# Patient Record
Sex: FEMALE | Race: WHITE | NOT HISPANIC OR LATINO | Employment: UNEMPLOYED | URBAN - METROPOLITAN AREA
[De-identification: names, ages, dates, MRNs, and addresses within clinical notes are randomized per-mention and may not be internally consistent; named-entity substitution may affect disease eponyms.]

---

## 2017-07-27 ENCOUNTER — TRANSCRIBE ORDERS (OUTPATIENT)
Dept: ADMINISTRATIVE | Facility: HOSPITAL | Age: 22
End: 2017-07-27

## 2017-07-27 ENCOUNTER — HOSPITAL ENCOUNTER (OUTPATIENT)
Dept: RADIOLOGY | Facility: MEDICAL CENTER | Age: 22
Discharge: HOME | End: 2017-07-27
Payer: COMMERCIAL

## 2017-07-27 DIAGNOSIS — R52 PAIN: ICD-10-CM

## 2017-07-27 DIAGNOSIS — R52 PAIN: Primary | ICD-10-CM

## 2017-07-27 PROCEDURE — 74177 CT ABD & PELVIS W/CONTRAST: CPT

## 2017-07-27 RX ADMIN — IOHEXOL 50 ML: 240 INJECTION, SOLUTION INTRATHECAL; INTRAVASCULAR; INTRAVENOUS; ORAL at 14:18

## 2017-07-27 RX ADMIN — IOHEXOL 100 ML: 350 INJECTION, SOLUTION INTRAVENOUS at 14:17

## 2019-01-10 ENCOUNTER — OFFICE VISIT (OUTPATIENT)
Dept: URGENT CARE | Facility: MEDICAL CENTER | Age: 24
End: 2019-01-10
Payer: COMMERCIAL

## 2019-01-10 VITALS
OXYGEN SATURATION: 99 % | TEMPERATURE: 97.6 F | BODY MASS INDEX: 47.55 KG/M2 | WEIGHT: 258.4 LBS | HEART RATE: 102 BPM | SYSTOLIC BLOOD PRESSURE: 113 MMHG | RESPIRATION RATE: 18 BRPM | DIASTOLIC BLOOD PRESSURE: 84 MMHG | HEIGHT: 62 IN

## 2019-01-10 DIAGNOSIS — S61.219A LACERATION WITHOUT FOREIGN BODY OF UNSPECIFIED FINGER WITHOUT DAMAGE TO NAIL, INITIAL ENCOUNTER: Primary | ICD-10-CM

## 2019-01-10 PROCEDURE — 90715 TDAP VACCINE 7 YRS/> IM: CPT

## 2019-01-10 PROCEDURE — G0382 LEV 3 HOSP TYPE B ED VISIT: HCPCS | Performed by: PHYSICIAN ASSISTANT

## 2019-01-10 PROCEDURE — 12002 RPR S/N/AX/GEN/TRNK2.6-7.5CM: CPT | Performed by: PHYSICIAN ASSISTANT

## 2019-01-10 PROCEDURE — S9083 URGENT CARE CENTER GLOBAL: HCPCS | Performed by: PHYSICIAN ASSISTANT

## 2019-01-10 RX ORDER — QUETIAPINE FUMARATE 200 MG/1
200 TABLET, FILM COATED ORAL DAILY
Refills: 1 | COMMUNITY
Start: 2018-12-03 | End: 2020-01-13 | Stop reason: SDUPTHER

## 2019-01-10 RX ORDER — ATENOLOL 25 MG/1
25 TABLET ORAL DAILY
Refills: 0 | COMMUNITY
Start: 2018-12-10 | End: 2020-01-13 | Stop reason: SDUPTHER

## 2019-01-10 RX ORDER — PAROXETINE 10 MG/1
10 TABLET, FILM COATED ORAL DAILY
Refills: 0 | COMMUNITY
Start: 2019-01-03 | End: 2020-01-13 | Stop reason: SDUPTHER

## 2019-01-10 RX ORDER — SIMVASTATIN 20 MG
20 TABLET ORAL
Refills: 1 | COMMUNITY
Start: 2018-12-29 | End: 2020-05-01

## 2019-01-11 NOTE — PATIENT INSTRUCTIONS
1  Keep skin clean and dry  2  Apply topical antibiotics daily  3  Watch for S&S of infection (redness, swelling, drainage, fever)  4   Suture removal in 7 days

## 2019-01-11 NOTE — PROGRESS NOTES
[facility]        NAME: Leighton Gutierrez is a 21 y o  female  : 1995    MRN: [id number]  DATE: January 10, 2019  TIME: 7:28 PM    Assessment and Plan   Laceration without foreign body of unspecified finger without damage to nail, initial encounter [S61 [id number]]  1  Laceration without foreign body of unspecified finger without damage to nail, initial encounter  TDAP Vaccine greater than or equal to 6yo         Patient Instructions     1  Keep skin clean and dry  2  Apply topical antibiotics daily  3  Watch for S&S of infection (redness, swelling, drainage, fever)  4  Suture removal in 7 days      Chief Complaint     Chief Complaint   Patient presents with    Laceration     left index finger         History of Present Illness       The patient is a 51-year-old female presents with a laceration to the tip of her left index finger after accident with that occurred with a knife earlier this evening  She was cutting up [place] spots when the knife slipped, striking her on the tip of the left index finger  Patient cleaned and irrigated the wound but it continued to bleed, she is unaware of her last tetanus vaccine  Review of Systems   Review of Systems   Constitutional: Negative  Skin: Positive for wound  Neurological: Negative for weakness and numbness           Current Medications       Current Outpatient Prescriptions:     atenolol (TENORMIN) 25 mg tablet, Take 25 mg by mouth daily, Disp: , Rfl: 0    PARoxetine (PAXIL) 10 mg tablet, Take 10 mg by mouth daily, Disp: , Rfl: 0    QUEtiapine (SEROquel) 200 mg tablet, Take 200 mg by mouth daily, Disp: , Rfl: 1    simvastatin (ZOCOR) 20 mg tablet, Take 20 mg by mouth daily at bedtime, Disp: , Rfl: 1    Current Allergies     Allergies as of 01/10/2019    (No Known Allergies)            The following portions of the patient's history were reviewed and updated as appropriate: allergies, current medications, past family history, past medical history, past social history, past surgical history and problem list      History reviewed  No pertinent past medical history  History reviewed  No pertinent surgical history  No family history on file  Medications have been verified  Objective   /84   Pulse 102   Temp 97 6 °F (36 4 °C)   Resp 18   Ht 5' 2" (1 575 m)   Wt 117 kg (258 lb 6 4 oz)   SpO2 99%   BMI 47 26 kg/m²        Physical Exam     Physical Exam   Constitutional: She appears well-developed and well-nourished  No distress  Cardiovascular: Normal rate, regular rhythm and normal heart sounds  No murmur heard    Pulmonary/Chest: Effort normal and breath sounds normal    Skin:

## 2019-09-16 ENCOUNTER — OFFICE VISIT (OUTPATIENT)
Dept: OBGYN CLINIC | Facility: MEDICAL CENTER | Age: 24
End: 2019-09-16
Payer: COMMERCIAL

## 2019-09-16 VITALS — DIASTOLIC BLOOD PRESSURE: 82 MMHG | SYSTOLIC BLOOD PRESSURE: 128 MMHG | WEIGHT: 264 LBS | BODY MASS INDEX: 48.29 KG/M2

## 2019-09-16 DIAGNOSIS — Z11.3 SCREEN FOR STD (SEXUALLY TRANSMITTED DISEASE): Primary | ICD-10-CM

## 2019-09-16 DIAGNOSIS — N92.6 IRREGULAR BLEEDING: ICD-10-CM

## 2019-09-16 DIAGNOSIS — Z01.419 ENCNTR FOR GYN EXAM (GENERAL) (ROUTINE) W/O ABN FINDINGS: ICD-10-CM

## 2019-09-16 PROCEDURE — G0145 SCR C/V CYTO,THINLAYER,RESCR: HCPCS | Performed by: PHYSICIAN ASSISTANT

## 2019-09-16 PROCEDURE — 87591 N.GONORRHOEAE DNA AMP PROB: CPT | Performed by: PHYSICIAN ASSISTANT

## 2019-09-16 PROCEDURE — 87491 CHLMYD TRACH DNA AMP PROBE: CPT | Performed by: PHYSICIAN ASSISTANT

## 2019-09-16 PROCEDURE — 99202 OFFICE O/P NEW SF 15 MIN: CPT | Performed by: PHYSICIAN ASSISTANT

## 2019-09-16 RX ORDER — ALPRAZOLAM 0.25 MG/1
TABLET ORAL
COMMUNITY
End: 2020-04-07 | Stop reason: SDUPTHER

## 2019-09-16 NOTE — PROGRESS NOTES
Alicia Tonio Leesa  1995    S:  21 y o  female here for a problem visit  She has had a Mirena IUD since 2/2015    She gets no bleeding with her Mirena, but this past month she did get a bleed and cramping  This was very odd for her    She is sexually active with her , they are together for 5 years    Since the wedding she was dx with depression, bipolar disorder - she is "looking for a psychiatrist so I can get on disability "  Her  works at a [employer]    They will consider pregnancy in less than a year most likely   He has "baby fever," she isn't sure if she is ready to embark on this    She is on Seroquel and Paxil for her depression, Tenormin for "racing heart beat "     Past Medical History:   Diagnosis Date    Chronic kidney disease     Depression     Kidney stone     Urinary tract infection      Family History   Problem Relation Age of Onset    Cervical cancer Mother     Heart attack Mother     Heart attack Father     Hypertension Father     Hyperlipidemia Father     No Known Problems Brother     Dementia Maternal Grandmother     Alzheimer's disease Maternal Grandmother     Throat cancer Paternal Grandfather      Social History     Socioeconomic History    Marital status: /Civil Union     Spouse name: None    Number of children: None    Years of education: None    Highest education level: None   Occupational History    None   Social Needs    Financial resource strain: None    Food insecurity:     Worry: None     Inability: None    Transportation needs:     Medical: None     Non-medical: None   Tobacco Use    Smoking status: Never Smoker    Smokeless tobacco: Never Used   Substance and Sexual Activity    Alcohol use: Yes     Frequency: Never     Drinks per session: 1 or 2     Binge frequency: Never    Drug use: No    Sexual activity: Yes     Partners: Male     Birth control/protection: IUD   Lifestyle    Physical activity:     Days per week: None     Minutes per session: None    Stress: None   Relationships    Social connections:     Talks on phone: None     Gets together: None     Attends [employer] service: None     Active member of club or organization: None     Attends meetings of clubs or organizations: None     Relationship status: None    Intimate partner violence:     Fear of current or ex partner: None     Emotionally abused: None     Physically abused: None     Forced sexual activity: None   Other Topics Concern    None   Social History Narrative    None       Review of Systems   Respiratory: Negative  Cardiovascular: Negative  Gastrointestinal: Negative for constipation and diarrhea  Genitourinary: Negative for difficulty urinating, pelvic pain, vaginal bleeding, vaginal discharge, itching or odor  O:  /82 (BP Location: Right arm, Patient Position: Sitting, Cuff Size: Standard)   Wt 120 kg (264 lb)   LMP 09/02/2019 (Exact Date)   BMI 48 29 kg/m²   She appears well and is in no distress  Abdomen is soft and nontender  External genitals are normal without lesions or rashes  Vagina is normal, no discharge or bleeding noted  Cervix is normal, no lesions or discharge IUD strings are normal  Uterus is nontender, no masses  Adnexa are nontender, no pelvic masses appreciated    A/P: Irregular bleed  Check TSH, Pap, cultures, pelvic ultrasound   She may want her Mirena removed, will await results first

## 2019-09-17 LAB
C TRACH DNA SPEC QL NAA+PROBE: NEGATIVE
N GONORRHOEA DNA SPEC QL NAA+PROBE: NEGATIVE

## 2019-09-19 LAB
LAB AP GYN PRIMARY INTERPRETATION: NORMAL
Lab: NORMAL

## 2020-01-13 ENCOUNTER — OFFICE VISIT (OUTPATIENT)
Dept: FAMILY MEDICINE CLINIC | Facility: MEDICAL CENTER | Age: 25
End: 2020-01-13
Payer: COMMERCIAL

## 2020-01-13 VITALS
HEIGHT: 65 IN | SYSTOLIC BLOOD PRESSURE: 120 MMHG | WEIGHT: 269 LBS | DIASTOLIC BLOOD PRESSURE: 86 MMHG | HEART RATE: 114 BPM | BODY MASS INDEX: 44.82 KG/M2 | OXYGEN SATURATION: 98 %

## 2020-01-13 DIAGNOSIS — R94.31 ABNORMAL EKG: ICD-10-CM

## 2020-01-13 DIAGNOSIS — Z11.59 NEED FOR HEPATITIS C SCREENING TEST: ICD-10-CM

## 2020-01-13 DIAGNOSIS — R00.0 TACHYCARDIA: ICD-10-CM

## 2020-01-13 DIAGNOSIS — F31.9 BIPOLAR AFFECTIVE DISORDER, REMISSION STATUS UNSPECIFIED (HCC): Primary | ICD-10-CM

## 2020-01-13 DIAGNOSIS — E78.5 HYPERLIPIDEMIA, UNSPECIFIED HYPERLIPIDEMIA TYPE: ICD-10-CM

## 2020-01-13 DIAGNOSIS — K92.1 BLOOD IN STOOL: ICD-10-CM

## 2020-01-13 DIAGNOSIS — Z11.4 SCREENING FOR HIV (HUMAN IMMUNODEFICIENCY VIRUS): ICD-10-CM

## 2020-01-13 PROBLEM — F32.A DEPRESSION: Status: ACTIVE | Noted: 2020-01-13

## 2020-01-13 PROCEDURE — 93000 ELECTROCARDIOGRAM COMPLETE: CPT | Performed by: FAMILY MEDICINE

## 2020-01-13 PROCEDURE — 99204 OFFICE O/P NEW MOD 45 MIN: CPT | Performed by: FAMILY MEDICINE

## 2020-01-13 RX ORDER — ATENOLOL 25 MG/1
25 TABLET ORAL DAILY
Qty: 30 TABLET | Refills: 2 | Status: SHIPPED | OUTPATIENT
Start: 2020-01-13 | End: 2020-11-16 | Stop reason: ALTCHOICE

## 2020-01-13 RX ORDER — QUETIAPINE FUMARATE 50 MG/1
50 TABLET, FILM COATED ORAL 2 TIMES DAILY
Qty: 60 TABLET | Refills: 2 | Status: SHIPPED | OUTPATIENT
Start: 2020-01-13 | End: 2020-03-03 | Stop reason: SDUPTHER

## 2020-01-13 RX ORDER — PAROXETINE 10 MG/1
10 TABLET, FILM COATED ORAL DAILY
Qty: 30 TABLET | Refills: 2 | Status: SHIPPED | OUTPATIENT
Start: 2020-01-13 | End: 2020-03-03 | Stop reason: DRUGHIGH

## 2020-01-13 NOTE — PROGRESS NOTES
Assessment/Plan:    No problem-specific Assessment & Plan notes found for this encounter  Diagnoses and all orders for this visit:    Bipolar affective disorder, remission status unspecified ([facility] [address] )  -     Ambulatory referral to Psychiatry; Future  -     PARoxetine (PAXIL) 10 mg tablet; Take 1 tablet (10 mg total) by mouth daily  -     QUEtiapine (SEROquel) 50 mg tablet; Take 1 tablet (50 mg total) by mouth 2 (two) times a day  Appears to be stable  I informed patient she will need to get in with Psychiatry for continued management of her bipolar disorder  I will refill her medication for short while until she can get in but she must make an appointment with Psychiatry  Patient acknowledged understanding  Refill Paxil and Seroquel  I informed patient I will not be refilling her Xanax  Hyperlipidemia, unspecified hyperlipidemia type  -     Lipid Panel with Direct LDL reflex; Future  Check a lipid profile  Review of previous lipid profile shows cholesterol to be alarmingly elevated  I have asked patient to fast 10 hours before getting her labs drawn  Will determine at that time if statin needs to be restarted  Tachycardia  -     POCT ECG  -     CBC and differential; Future  -     Comprehensive metabolic panel; Future  -     Hemoglobin A1C; Future  -     T4, free; Future  -     TSH, 3rd generation; Future  -     Ambulatory referral to Cardiology; Future  -     atenolol (TENORMIN) 25 mg tablet; Take 1 tablet (25 mg total) by mouth daily  Tachycardia on exam   EKG shows tachycardia with some additional abnormality  Patient was informed she does need to restart atenolol 25 mg daily  She was in agreement  Will have her see Cardiology as well  Check some additional labs  Abnormal EKG  -     Ambulatory referral to Cardiology; Future  Referral to Cardiology for evaluation  Blood in stool  -     Occult Blood, Fecal Immunochemical; Future  Patient states blood in stool on review of systems    Check occult blood  May need to see GI if positive  Need for hepatitis C screening test  -     Hepatitis C antibody; Future  Screening for HIV (human immunodeficiency virus)  -     HIV 1/2 AG-AB combo; Future  Patient did give verbal consent for hep seen HIV screening  Patient declines to receive the flu vaccine or any other age-appropriate vaccinations  Follow-up in three months or sooner if needed  Subjective:      Patient ID: Charlotte Fontaine is a 25 y o  female  Patient [facility] to [facility]  She has bipolar disorder  Was seen by Psychiatry in the past but no longer  More recently she was seen at [facility] for her primary care provider who was managing her bipolar disorder  Patient states she has had depression since high school  Officially diagnosed with depression about five years ago and started on Paxil  Four years ago she was diagnosed with bipolar disorder and placed on Seroquel  Medications do help to control her symptoms  She was suicidal in the past but no longer  Also has anxiety and takes as needed alprazolam   She has paperwork from when she was 1st diagnosed with bipolar disorder  She has hyperlipidemia  She was on simvastatin 20 mg daily but ran out of medication about six months ago  He was tolerating medication well to time  She has tachycardia  Has been longstanding  She was on atenolol 25 mg daily but ran out of medication about six months ago  Tachycardia was not bothersome to patient but her previous primary insisted on medication  The following portions of the patient's history were reviewed and updated as appropriate:   She  has a past medical history of Chronic kidney disease and Depression    She   Patient Active Problem List    Diagnosis Date Noted    Bipolar disorder ([facility] [address] ) 01/13/2020    Depression 01/13/2020    Tachycardia 01/13/2020    Hyperlipidemia 01/13/2020    Abnormal EKG 01/13/2020    Blood in stool 01/13/2020     She  has a past surgical history that includes No past surgeries  Her family history includes Alzheimer's disease in her maternal grandmother; Cervical cancer in her mother; Dementia in her maternal grandmother; Heart attack in her father and mother; Hyperlipidemia in her father; Hypertension in her father; No Known Problems in her brother; Throat cancer in her paternal grandfather  She  reports that she has never smoked  She has never used smokeless tobacco  She reports that she drinks alcohol  She reports that she does not use drugs  Current Outpatient Medications   Medication Sig Dispense Refill    ALPRAZolam (XANAX) 0 25 mg tablet Take by mouth daily at bedtime as needed for anxiety      PARoxetine (PAXIL) 10 mg tablet Take 1 tablet (10 mg total) by mouth daily 30 tablet 2    QUEtiapine (SEROquel) 50 mg tablet Take 1 tablet (50 mg total) by mouth 2 (two) times a day 60 tablet 2    atenolol (TENORMIN) 25 mg tablet Take 1 tablet (25 mg total) by mouth daily 30 tablet 2    simvastatin (ZOCOR) 20 mg tablet Take 20 mg by mouth daily at bedtime  1     No current facility-administered medications for this visit  Current Outpatient Medications on File Prior to Visit   Medication Sig    ALPRAZolam (XANAX) 0 25 mg tablet Take by mouth daily at bedtime as needed for anxiety    [DISCONTINUED] PARoxetine (PAXIL) 10 mg tablet Take 10 mg by mouth daily    [DISCONTINUED] QUEtiapine (SEROquel) 200 mg tablet Take 200 mg by mouth daily    simvastatin (ZOCOR) 20 mg tablet Take 20 mg by mouth daily at bedtime    [DISCONTINUED] atenolol (TENORMIN) 25 mg tablet Take 25 mg by mouth daily     No current facility-administered medications on file prior to visit  She has No Known Allergies       Review of Systems   Constitutional: Negative for fever  HENT: Negative for ear pain, rhinorrhea and sore throat  Eyes: Negative for visual disturbance  Respiratory: Negative for shortness of breath      Cardiovascular: Negative for chest pain  Gastrointestinal: Positive for blood in stool  Negative for abdominal pain  Genitourinary: Negative for dysuria and hematuria  Musculoskeletal: Negative for arthralgias and myalgias  Skin: Negative for rash  Neurological: Negative for headaches  Psychiatric/Behavioral: Negative for dysphoric mood  The patient is not nervous/anxious  Objective:      /86 (BP Location: Left arm, Patient Position: Sitting, Cuff Size: Large)   Pulse (!) 114   Ht 5' 4 5" (1 638 m)   Wt 122 kg (269 lb)   SpO2 98%   BMI 45 46 kg/m²          Physical Exam   Constitutional: She is oriented to person, place, and time  Vital signs are normal  She appears well-developed and well-nourished  HENT:   Head: Normocephalic and atraumatic  Right Ear: Tympanic membrane, external ear and ear canal normal    Left Ear: Tympanic membrane, external ear and ear canal normal    Nose: Nose normal    Mouth/Throat: Uvula is midline, oropharynx is clear and moist and mucous membranes are normal    Eyes: Pupils are equal, round, and reactive to light  Conjunctivae, EOM and lids are normal    Neck: Trachea normal  Neck supple  No thyromegaly present  Cardiovascular: Regular rhythm, S1 normal and S2 normal  Tachycardia present  No murmur heard  Pulmonary/Chest: Effort normal and breath sounds normal    Abdominal: Soft  Bowel sounds are normal  There is no tenderness  Lymphadenopathy:     She has no cervical adenopathy  Neurological: She is alert and oriented to person, place, and time  No cranial nerve deficit  Skin: Skin is warm, dry and intact  Psychiatric: She has a normal mood and affect   Her speech is normal and behavior is normal  Thought content normal

## 2020-01-17 ENCOUNTER — TELEPHONE (OUTPATIENT)
Dept: PSYCHIATRY | Facility: CLINIC | Age: 25
End: 2020-01-17

## 2020-01-17 NOTE — TELEPHONE ENCOUNTER
Behavorial Health Outpatient Intake Questions    Referred by:    Please advised interviewee that they need to answer all questions truthfully to allow for best care and any misrepresentations of information may affect their ability to be seen at this clinic   => Was this discussed? Yes     Behavorial Health Outpatient Intake History -     Presenting Problem (in patient's words): BIPOLAR    Has the patient ever seen or is currently seeing a psychiatrist? No   If yes who/when? If seen as outpatient, have they been seen here (and by whom)? If not seen here, which provider(s) did the patient see and for how long? Has the patient ever seen or currently see a therapist? No If yes who/when? Has a member of the patient's family been in therapy here? No  If yes, with whom? Has the patient been hospitalized for mental health? Yes   If yes, how long ago was last hospitalization and where was it? 1 WEEK, [facility]     Substance Abuse:No concerns of substance abuse are reported  Does the patient have ICM or CTT? No    Is the patient taking injectable psychiatric medications? No    => If yes, patient cannot be seen here  Communications  Are there any developmental disabilities? No    Does the patient have hearing impairment? No       History-    Has the patient served in the [place] [address]? No    If yes, have you had combat services? No    Was the patient activated into federal active duty as a member of the [employer] or reserve? No    Legal History-     Does the patient have any history of arrests, long-term/FPC time, or DUIs? No  If Yes-  1) What types of charges? 2) When were they last incarcerated? 3) Are they currently on parole or probation? Minor Child-    Who has custody of the child? Is there a custody agreement? If there is a custody agreement remind parent that they must bring a copy to the first appt or they will not be seen       Intake Team, please check with provider before scheduling if flags come up such as:  - complex case  - legal history (other than DUI)  - communication barrier concerns are present  - if, in your judgment, this needs further review    ACCEPTED as a patient Yes  => Appointment Date: 03/03/2020 w/ MISSY MOREIRA    Referred Elsewhere? No    Name of Insurance Co: [employer] ID# [id number]  Insurance Phone #  If ins is primary or secondary  If patient is a minor, parents information such as Name, D  O B of guarantor

## 2020-01-29 ENCOUNTER — OFFICE VISIT (OUTPATIENT)
Dept: FAMILY MEDICINE CLINIC | Facility: CLINIC | Age: 25
End: 2020-01-29
Payer: COMMERCIAL

## 2020-01-29 VITALS
HEART RATE: 145 BPM | WEIGHT: 263 LBS | OXYGEN SATURATION: 97 % | HEIGHT: 64 IN | TEMPERATURE: 98.3 F | DIASTOLIC BLOOD PRESSURE: 86 MMHG | SYSTOLIC BLOOD PRESSURE: 136 MMHG | RESPIRATION RATE: 16 BRPM | BODY MASS INDEX: 44.9 KG/M2

## 2020-01-29 DIAGNOSIS — E78.2 MIXED HYPERLIPIDEMIA: ICD-10-CM

## 2020-01-29 DIAGNOSIS — K92.1 BLOOD IN STOOL: ICD-10-CM

## 2020-01-29 DIAGNOSIS — R00.0 TACHYCARDIA: ICD-10-CM

## 2020-01-29 DIAGNOSIS — R19.8 IRREGULAR BOWEL HABITS: ICD-10-CM

## 2020-01-29 DIAGNOSIS — Z11.4 ENCOUNTER FOR SCREENING FOR HIV: ICD-10-CM

## 2020-01-29 DIAGNOSIS — F31.31 BIPOLAR AFFECTIVE DISORDER, CURRENTLY DEPRESSED, MILD (HCC): ICD-10-CM

## 2020-01-29 DIAGNOSIS — Z00.00 ROUTINE ADULT HEALTH MAINTENANCE: Primary | ICD-10-CM

## 2020-01-29 DIAGNOSIS — R63.5 WEIGHT GAIN: ICD-10-CM

## 2020-01-29 DIAGNOSIS — Z23 NEED FOR VACCINATION: ICD-10-CM

## 2020-01-29 DIAGNOSIS — M67.432 GANGLION CYST OF DORSUM OF LEFT WRIST: ICD-10-CM

## 2020-01-29 DIAGNOSIS — R00.0 SINUS TACHYCARDIA: ICD-10-CM

## 2020-01-29 PROBLEM — E78.5 HYPERLIPIDEMIA: Status: RESOLVED | Noted: 2020-01-13 | Resolved: 2020-01-29

## 2020-01-29 PROBLEM — R94.31 ABNORMAL EKG: Status: RESOLVED | Noted: 2020-01-13 | Resolved: 2020-01-29

## 2020-01-29 PROCEDURE — 90651 9VHPV VACCINE 2/3 DOSE IM: CPT

## 2020-01-29 PROCEDURE — 99395 PREV VISIT EST AGE 18-39: CPT | Performed by: FAMILY MEDICINE

## 2020-01-29 PROCEDURE — 90471 IMMUNIZATION ADMIN: CPT

## 2020-01-29 NOTE — PROGRESS NOTES
[address] [employer] [place] 25 y o  female   DATE: January 29, 2020     Assessment and Plan:  25 y o  female exam      1  Health Maintenance  - Colonoscopy? Never had one, does have rectal bleeding and irregular bowel habits, recommend GI eval, possibly rectal fissures  - Pap? [id number]  - Labs: Re-ordered FLP, CMP, TSH, CBC, HIV  - Immunizations: Reviewed  TDaP 1/10/2019  Recommend  yearly flu vaccine-declined  HPV#1 given today, RTC 2 months for HPV#2     2  Other diagnoses addressed today:   Bipolar disorder ([facility] [address] )  Long standing diagnosis  Per patient, has been stable on Paxil 10mg, Seroquel 50mg BID and Xanax 0 25mg PRN  I agreed with previous PCP, that I can provide interim medication refills, but should establish with Psychiatry for continuing management  Pt is agreeable, has an appt for 3/3/2020 and does not need any refills today    Sinus tachycardia  Previous EKG could not be obtained, EKG today showed sinus tachycardia @ 106bpm, on exam HR 120s  Previously was on Atenolol 25mg, will check Echo prior to re-starting meds  May be related to psych meds, but will not adjust at this time    Mixed hyperlipidemia  Previously quite uncontrolled, stopped Simvastatin 20mg due to insurance reasons  Reviewed healthy, low cholesterol diet  Recheck FLP prior to restarting statin    Blood in stool  Has irregular bowel habits with diarrhea and occasional rectal bleeding, possible anal fissure, refer to GI for anoscopy or possible colonoscopy    Ganglion cyst of dorsum of left wrist  Now is having symptoms and is enlarging in size, requesting referral, provided for hand surgery      BMI Counseling: Body mass index is 45 14 kg/m²  The BMI is above normal  Nutrition recommendations include reducing intake of cholesterol  Follow up next physical in 1 year  Subjective:    Bandar Gonzalez is a 25 y o  female and is here for a comprehensive physical exam    Acute Complaints: Pt here to establish care  Was following with Dr Ranjan Yip, but her insurance change and she established with Dr Mei Hudson, but did not have a good rapport, so is looking for a new doctor  - , no children  She has a history of eating disorder with binging/purging, Bipolar disorder- Paxil 10mg, Seroquel 50mg BID, Xanax 0 25mg PRN; has an appt with Psych in 1 month  HLD- has been off of Simvastatin for 6 months due to insurance  Tachycardia- states she was started on atenolol 25mg for high heart rate, no work up other than EKG done by PCP who started med, she has been off of it for the past 6 months due to insurance/cost  Diet: blueberry muffin/cottage cheese, lunch-snack, dinner- large portion  Exercise: doesn't exercise at all, has gained weight with the Mirena (states that she gained 50 pounds with that) and Bipolar meds (gained another 50 pounds with that)  Has a cyst on her left hand that is now getting painful and getting larger in size, states she has never mentioned it to any doctors before       Histories Updated and Reviewed 1/29/2020:  Patient's Medications   New Prescriptions    No medications on file   Previous Medications    ALPRAZOLAM (XANAX) 0 25 MG TABLET    Take by mouth daily at bedtime as needed for anxiety    ATENOLOL (TENORMIN) 25 MG TABLET    Take 1 tablet (25 mg total) by mouth daily    PAROXETINE (PAXIL) 10 MG TABLET    Take 1 tablet (10 mg total) by mouth daily    QUETIAPINE (SEROQUEL) 50 MG TABLET    Take 1 tablet (50 mg total) by mouth 2 (two) times a day    SIMVASTATIN (ZOCOR) 20 MG TABLET    Take 20 mg by mouth daily at bedtime   Modified Medications    No medications on file   Discontinued Medications    No medications on file     No Known Allergies  Past Medical History:   Diagnosis Date    Chronic kidney disease     Depression     Hyperlipidemia 1/13/2020     Social History     Socioeconomic History    Marital status: /Civil Union     Spouse name: Not on file    Number of children: Not on file  Years of education: Not on file    Highest education level: Not on file   Occupational History    Not on file   Social Needs    Financial resource strain: Not on file    Food insecurity:     Worry: Not on file     Inability: Not on file    Transportation needs:     Medical: Not on file     Non-medical: Not on file   Tobacco Use    Smoking status: Never Smoker    Smokeless tobacco: Never Used   Substance and Sexual Activity    Alcohol use: Yes     Frequency: Never     Drinks per session: 1 or 2     Binge frequency: Never    Drug use: No    Sexual activity: Yes     Partners: Male     Birth control/protection: IUD   Lifestyle    Physical activity:     Days per week: Not on file     Minutes per session: Not on file    Stress: Not on file   Relationships    Social connections:     Talks on phone: Not on file     Gets together: Not on file     Attends Voodoo service: Not on file     Active member of club or organization: Not on file     Attends meetings of clubs or organizations: Not on file     Relationship status: Not on file    Intimate partner violence:     Fear of current or ex partner: Not on file     Emotionally abused: Not on file     Physically abused: Not on file     Forced sexual activity: Not on file   Other Topics Concern    Not on file   Social History Narrative    Not on file     Immunization History   Administered Date(s) Administered    DTaP,unspecified 1995, 03/30/1996, 05/29/1996, 01/29/1997, 12/07/1999    HPV9 01/29/2020    Hep B, Adolescent or Pediatric 1995, 1995, 05/29/1996    HiB 1995, 03/20/1996, 05/29/1996, 01/29/1997    IPV 03/20/1996, 05/29/1996, 12/28/1996, 12/07/1999    MMR 10/16/1996, 12/07/1999, 07/30/2008    Tdap 01/10/2019    Varicella 03/20/1996, 05/29/1996     Review of Systems:  Review of Systems   Constitutional: Negative for chills and fever  HENT: Negative for ear pain  Eyes: Negative for visual disturbance     Respiratory: Negative for cough and shortness of breath  Cardiovascular: Positive for palpitations  Negative for chest pain  Gastrointestinal: Positive for blood in stool and diarrhea  Negative for abdominal pain, nausea and vomiting  Musculoskeletal: Negative for arthralgias  Skin: Negative for rash  Neurological: Negative for headaches  Hematological: Does not bruise/bleed easily  Psychiatric/Behavioral: Positive for dysphoric mood  Objective:  /86   Pulse (!) 145   Temp 98 3 °F (36 8 °C)   Resp 16   Ht 5' 4" (1 626 m)   Wt 119 kg (263 lb)   SpO2 97%   BMI 45 14 kg/m²   Physical Exam   Constitutional: She is oriented to person, place, and time  She appears well-developed and well-nourished  No distress  obese   HENT:   Head: Normocephalic and atraumatic  Mouth/Throat: Oropharynx is clear and moist  No oropharyngeal exudate  Eyes: Pupils are equal, round, and reactive to light  EOM are normal  Right eye exhibits no discharge  Left eye exhibits no discharge  Neck: Normal range of motion  Neck supple  No JVD present  Cardiovascular: Regular rhythm and normal heart sounds  Tachycardia present  No murmur heard  Pulmonary/Chest: Effort normal and breath sounds normal  No stridor  No respiratory distress  She has no wheezes  Abdominal: Soft  Bowel sounds are normal  There is no tenderness  There is no rebound and no guarding  Musculoskeletal: Normal range of motion  She exhibits no edema or tenderness  Arms:  Neurological: She is alert and oriented to person, place, and time  Skin: Skin is warm and dry  She is not diaphoretic  No erythema  Psychiatric: She has a normal mood and affect  Her behavior is normal    Vitals reviewed  Patient Care Team:  Gabriele Hook MD as PCP - General (Family Medicine)    Gabriele Hook MD    Note: Portions of the record may have been created with voice recognition software    Occasional wrong word or "sound a like" substitutions may have occurred due to the inherent limitations of voice recognition software  Read the chart carefully and recognize, using context, where substitutions have occurred

## 2020-01-29 NOTE — ASSESSMENT & PLAN NOTE
Previously quite uncontrolled, stopped Simvastatin 20mg due to insurance reasons  Reviewed healthy, low cholesterol diet  Recheck FLP prior to restarting statin

## 2020-01-29 NOTE — ASSESSMENT & PLAN NOTE
Long standing diagnosis  Per patient, has been stable on Paxil 10mg, Seroquel 50mg BID and Xanax 0 25mg PRN  I agreed with previous PCP, that I can provide interim medication refills, but should establish with Psychiatry for continuing management   Pt is agreeable, has an appt for 3/3/2020 and does not need any refills today

## 2020-01-29 NOTE — ASSESSMENT & PLAN NOTE
Has irregular bowel habits with diarrhea and occasional rectal bleeding, possible anal fissure, refer to GI for anoscopy or possible colonoscopy

## 2020-01-29 NOTE — ASSESSMENT & PLAN NOTE
Previous EKG could not be obtained, EKG today showed sinus tachycardia @ 106bpm, on exam HR 120s   Previously was on Atenolol 25mg, will check Echo prior to re-starting meds  May be related to psych meds, but will not adjust at this time

## 2020-03-03 ENCOUNTER — OFFICE VISIT (OUTPATIENT)
Dept: PSYCHIATRY | Facility: CLINIC | Age: 25
End: 2020-03-03
Payer: COMMERCIAL

## 2020-03-03 VITALS — DIASTOLIC BLOOD PRESSURE: 77 MMHG | SYSTOLIC BLOOD PRESSURE: 112 MMHG | HEART RATE: 103 BPM

## 2020-03-03 DIAGNOSIS — F41.1 GAD (GENERALIZED ANXIETY DISORDER): ICD-10-CM

## 2020-03-03 DIAGNOSIS — F42.2 MIXED OBSESSIONAL THOUGHTS AND ACTS: ICD-10-CM

## 2020-03-03 DIAGNOSIS — F31.30 BIPOLAR AFFECTIVE DISORDER, CURRENT EPISODE DEPRESSED, CURRENT EPISODE SEVERITY UNSPECIFIED (HCC): Primary | ICD-10-CM

## 2020-03-03 DIAGNOSIS — F43.10 PTSD (POST-TRAUMATIC STRESS DISORDER): ICD-10-CM

## 2020-03-03 PROCEDURE — 90792 PSYCH DIAG EVAL W/MED SRVCS: CPT | Performed by: PHYSICIAN ASSISTANT

## 2020-03-03 PROCEDURE — 1036F TOBACCO NON-USER: CPT | Performed by: PHYSICIAN ASSISTANT

## 2020-03-03 RX ORDER — QUETIAPINE FUMARATE 50 MG/1
50 TABLET, FILM COATED ORAL 2 TIMES DAILY
Qty: 60 TABLET | Refills: 2 | Status: SHIPPED | OUTPATIENT
Start: 2020-04-13 | End: 2020-05-15 | Stop reason: SDUPTHER

## 2020-03-03 RX ORDER — PAROXETINE 10 MG/1
15 TABLET, FILM COATED ORAL DAILY
Qty: 45 TABLET | Refills: 2 | Status: SHIPPED | OUTPATIENT
Start: 2020-03-03 | End: 2020-04-07 | Stop reason: DRUGHIGH

## 2020-03-03 NOTE — BH TREATMENT PLAN
TREATMENT PLAN (Medication Management Only)        [address] [employer]    Name and Date of Birth:  Yomaira Rodrigez  1995  Date of Treatment Plan: March 3, 2020  Diagnosis/Diagnoses: Bp d/o, SUSANNA, PCD, PTSD  Strengths/Personal Resources for Self-Care: "good listener, caring wife"  Area/Areas of need (in own words): "self esteem", anxiety  1  Long Term Goal: weight loss goal   Target Date: 1 year  Person/Persons responsible for completion of goal: Pam Perales  Short Term Objective (s) - How will we reach this goal?:   A  Provider new recommended medication/dosage changes and/or continue medication(s): continue current medications as prescribed  B  start therapy  C  weight loss  Target Date: 2 months - 5/3/2020  Person/Persons Responsible for Completion of Goal: sheila Orozco  Progress Towards Goals: initiating treatment  Treatment Modality: medication management every 5 weeks, referral for individual psychotherapy  Review due 180 days from date of this plan: 09/03/20  Expected length of service: ongoing treatment  My Physician/PA/NP and I have developed this plan together and I agree to work on the goals and objectives  I understand the treatment goals that were developed for my treatment
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
## 2020-03-05 ENCOUNTER — OFFICE VISIT (OUTPATIENT)
Dept: GASTROENTEROLOGY | Facility: CLINIC | Age: 25
End: 2020-03-05
Payer: COMMERCIAL

## 2020-03-05 VITALS
WEIGHT: 264 LBS | BODY MASS INDEX: 45.32 KG/M2 | HEART RATE: 122 BPM | SYSTOLIC BLOOD PRESSURE: 128 MMHG | DIASTOLIC BLOOD PRESSURE: 88 MMHG

## 2020-03-05 DIAGNOSIS — R74.8 ELEVATED ALKALINE PHOSPHATASE IN NEWBORN: ICD-10-CM

## 2020-03-05 DIAGNOSIS — K92.1 BLOOD IN STOOL: ICD-10-CM

## 2020-03-05 DIAGNOSIS — R19.7 DIARRHEA, UNSPECIFIED TYPE: Primary | ICD-10-CM

## 2020-03-05 DIAGNOSIS — R19.8 IRREGULAR BOWEL HABITS: ICD-10-CM

## 2020-03-05 PROCEDURE — 99244 OFF/OP CNSLTJ NEW/EST MOD 40: CPT | Performed by: INTERNAL MEDICINE

## 2020-03-05 NOTE — LETTER
March 5, 2020     Ankit Ang MD  [address] [place] [address]    Patient: Maureen Mon   YOB: 1995   Date of Visit: 3/5/2020       Dear Dr Daiana Ang: Thank you for referring Trevin Lee to me for evaluation  Below are my notes for this consultation  If you have questions, please do not hesitate to call me  I look forward to following your patient along with you  Sincerely,        Stpehanie Cifuentes MD        CC: No Recipients  Stephanie Cifuentes MD  3/5/2020  2:22 PM  Incomplete  [facility]    Dear Dr Daiana Ang     I had the pleasure of seeing your patient Maureen Mon in the office today and I thank you for this kind referral        Chief Complaint:  Diarrhea      HPI:  Maureen Mon is a 25 y o  female who presents with diarrhea  According to the patient this is been going on for quite some time  It appears to be due to certain types of food particularly lactose products and eggs  However it also occurs outside of these  She has never had an actual copy of a lactose-free diet  She gets severe urgency and then has multiple loose stools  These and not watery  She does get rectal bleeding mostly on the toilet paper  She does have nocturnal symptomatology  She has no exertional symptomatology  There is no incontinence  The patient has never had a colonoscopy  She had an EGD done approximately 6 years ago which according to her was negative  She does not have any fever or chills  There is no other apparent exacerbating or remitting factor  The patient notes also indicate there is an elevation of her alkaline phosphatase  I had asked the patient if she had ever had any liver issues and she said there was an ultrasound which showed an enlarged liver but that was as far as it went  She has never any hepatitis  No other antecedent liver disease  Her last alkaline phosphatase was 158  Platelet count 660   Hemoglobin and hematocrit are normal   There is no other contributory GI history         Review of Systems:   Constitutional: No fever or chills, feels well, no tiredness, no recent weight gain or weight loss  HENT: No complaints of earache, no hearing loss, no nosebleeds, no nasal discharge, no sore throat, no hoarseness  Eyes: No complaints of eye pain, no red eyes, no discharge from eyes, no itchy eyes  Cardiovascular: No complaints of slow heart rate, no fast heart rate, no chest pain, no palpitations, no leg claudication, no lower extremity edema  Respiratory: No complaints of shortness of breath, no wheezing, no cough, no SOB on exertion, no orthopnea  Gastrointestinal: As noted in HPI  Genitourinary: No complaints of dysuria, no incontinence, no hesitancy, no nocturia  Musculoskeletal: No complaints of arthralgia, no myalgias, no joint swelling or stiffness, no limb pain or swelling  Neurological: No complaints of headache, no confusion, no convulsions, no numbness or tingling, no dizziness or fainting, no limb weakness, no difficulty walking  Skin: No complaints of skin rash or skin lesions, no itching, no skin wound, no dry skin  Hematological/Lymphatic: No complaints of swollen glands, does not bleed easy  Allergic/Immunologic: No immunocompromised state  Endocrine:  No complaints of polyuria, no polydipsia  Psychiatric/Behavioral:  no change in personality, positive PTSD, positive emotional problems, bipolar disorder         Historical Information   Past Medical History:   Diagnosis Date    Bipolar disorder ([place] [id number] )     Chronic kidney disease     Depression     Hyperlipidemia 1/13/2020    Self-injurious behavior     Suicide attempt ([place] [id number] )     2016     Past Surgical History:   Procedure Laterality Date    NO PAST SURGERIES       Social History   Social History     Substance and Sexual Activity   Alcohol Use Yes    Frequency: Monthly or less    Drinks per session: 1 or 2    Binge frequency: Never    Comment: 1-2 mixed drinks once monthly or less     Social History     Substance and Sexual Activity   Drug Use Never     Social History     Tobacco Use   Smoking Status Never Smoker   Smokeless Tobacco Never Used     Family History   Problem Relation Age of Onset    Cervical cancer Mother     Heart attack Mother     Anxiety disorder Mother     Depression Mother     Diverticulitis Mother     Heart attack Father     Hypertension Father     Hyperlipidemia Father     No Known Problems Brother     Dementia Maternal Grandmother     Alzheimer's disease Maternal Grandmother     Throat cancer Paternal Grandfather     Schizophrenia Maternal Aunt          Current Medications: has a current medication list which includes the following prescription(s): alprazolam, atenolol, paroxetine, quetiapine, and simvastatin  Vital Signs: /88   Pulse (!) 122   Wt 120 kg (264 lb)   BMI 45 32 kg/m²      Physical Exam:   Constitutional  General Appearance: No acute distress, well appearing and well nourished, central obesity  Head  Normocephalic  Eyes  Conjunctivae and lids: No swelling, erythema, or discharge  Pupils and irises: Equal, round and reactive to light  Ears, Nose, Mouth, and Throat  External inspection of ears and nose: Normal  Nasal mucosa, septum and turbinates: Normal without edema or erythema/   Oropharynx: Normal with no erythema, edema, exudate or lesions  Neck  Normal range of motion  Neck supple  Cardiovascular  Auscultation of the heart: Normal rate and rhythm, normal S1 and S2 without murmurs  Examination of the extremities for edema and/or varicosities: Normal  Pulmonary/Chest  Respiratory effort: No increased work of breathing or signs of respiratory distress  Auscultation of lungs: Clear to auscultation, equal breath sounds bilaterally, no wheezes, rales, no rhonchi  Abdomen  Abdomen: Non-tender, no masses  Liver and spleen: No hepatomegaly or splenomegaly  Musculoskeletal  Gait and station: normal   Digits and Nails: normal without clubbing or cyanosis  Inspection/palpation of joints, bones, and muscles: Normal  Neurological  No nystagmus or asterixis  Skin  Skin and subcutaneous tissue: Normal without rashes or lesions  Multiple tattoos  Lymphatic  Palpation of the lymph nodes in neck: No lymphadenopathy  Psychiatric  Orientation to person, place and time: Normal   Mood and affect: Normal          Labs:   No results found for: ALT, AST, BUN, CALCIUM, CL, CHOL, CO2, CREATININE, GFRAA, GFRNONAA, HDL, HCT, HGB, HGBA1C, LDL, MG, PHOS, PLT, K, PSA, NA, TRIG, WBC      X-Rays & Procedures:   No orders to display         ______________________________________________________________________      Assessment & Plan:      Diagnoses and all orders for this visit:    Diarrhea, unspecified type  -     Celiac Disease Antibody Profile; Future    Irregular bowel habits  -     Ambulatory referral to Gastroenterology    Blood in stool  -     Ambulatory referral to Gastroenterology    Elevated alkaline phosphatase in   -     Alkaline phosphatase, isoenzymes; Future  -     Gamma GT; Future      I Have taken liberty of scheduling the patient for colonoscopy  I have given her a copy of a lactose-free diet to follow  We will obtain fractionated alkaline phosphatase and gamma GT  We will also obtain a celiac panel  I will be happy to inform you of her results and any further recommendations  I would like to thank you for allowing me to participate in her care                With warmest regards,    Halie Severino MD, [facility]

## 2020-03-05 NOTE — H&P (VIEW-ONLY)
[facility]    Dear Dr Renuka Way     I had the pleasure of seeing your patient Fernando Giles in the office today and I thank you for this kind referral        Chief Complaint:  Diarrhea      HPI:  Fernando Giles is a 25 y o  female who presents with diarrhea  According to the patient this is been going on for quite some time  It appears to be due to certain types of food particularly lactose products and eggs  However it also occurs outside of these  She has never had an actual copy of a lactose-free diet  She gets severe urgency and then has multiple loose stools  These and not watery  She does get rectal bleeding mostly on the toilet paper  She does have nocturnal symptomatology  She has no exertional symptomatology  There is no incontinence  The patient has never had a colonoscopy  She had an EGD done approximately 6 years ago which according to her was negative  She does not have any fever or chills  There is no other apparent exacerbating or remitting factor  The patient notes also indicate there is an elevation of her alkaline phosphatase  I had asked the patient if she had ever had any liver issues and she said there was an ultrasound which showed an enlarged liver but that was as far as it went  She has never any hepatitis  No other antecedent liver disease  Her last alkaline phosphatase was 158  Platelet count 871  Hemoglobin and hematocrit are normal   There is no other contributory GI history         Review of Systems:   Constitutional: No fever or chills, feels well, no tiredness, no recent weight gain or weight loss  HENT: No complaints of earache, no hearing loss, no nosebleeds, no nasal discharge, no sore throat, no hoarseness  Eyes: No complaints of eye pain, no red eyes, no discharge from eyes, no itchy eyes    Cardiovascular: No complaints of slow heart rate, no fast heart rate, no chest pain, no palpitations, no leg claudication, no lower extremity edema  Respiratory: No complaints of shortness of breath, no wheezing, no cough, no SOB on exertion, no orthopnea  Gastrointestinal: As noted in HPI  Genitourinary: No complaints of dysuria, no incontinence, no hesitancy, no nocturia  Musculoskeletal: No complaints of arthralgia, no myalgias, no joint swelling or stiffness, no limb pain or swelling  Neurological: No complaints of headache, no confusion, no convulsions, no numbness or tingling, no dizziness or fainting, no limb weakness, no difficulty walking  Skin: No complaints of skin rash or skin lesions, no itching, no skin wound, no dry skin  Hematological/Lymphatic: No complaints of swollen glands, does not bleed easy  Allergic/Immunologic: No immunocompromised state  Endocrine:  No complaints of polyuria, no polydipsia  Psychiatric/Behavioral:  no change in personality, positive PTSD, positive emotional problems, bipolar disorder         Historical Information   Past Medical History:   Diagnosis Date    Bipolar disorder ([place] [address] )     Chronic kidney disease     Depression     Hyperlipidemia 1/13/2020    Self-injurious behavior     Suicide attempt ([place] [address] )     2016     Past Surgical History:   Procedure Laterality Date    NO PAST SURGERIES       Social History   Social History     Substance and Sexual Activity   Alcohol Use Yes    Frequency: Monthly or less    Drinks per session: 1 or 2    Binge frequency: Never    Comment: 1-2 mixed drinks once monthly or less     Social History     Substance and Sexual Activity   Drug Use Never     Social History     Tobacco Use   Smoking Status Never Smoker   Smokeless Tobacco Never Used     Family History   Problem Relation Age of Onset    Cervical cancer Mother     Heart attack Mother     Anxiety disorder Mother     Depression Mother     Diverticulitis Mother     Heart attack Father     Hypertension Father     Hyperlipidemia Father     No Known Problems Brother     Dementia Maternal Grandmother     Alzheimer's disease Maternal Grandmother     Throat cancer Paternal Grandfather     Schizophrenia Maternal Aunt          Current Medications: has a current medication list which includes the following prescription(s): alprazolam, atenolol, paroxetine, quetiapine, and simvastatin  Vital Signs: /88   Pulse (!) 122   Wt 120 kg (264 lb)   BMI 45 32 kg/m²     Physical Exam:   Constitutional  General Appearance: No acute distress, well appearing and well nourished, central obesity  Head  Normocephalic  Eyes  Conjunctivae and lids: No swelling, erythema, or discharge  Pupils and irises: Equal, round and reactive to light  Ears, Nose, Mouth, and Throat  External inspection of ears and nose: Normal  Nasal mucosa, septum and turbinates: Normal without edema or erythema/   Oropharynx: Normal with no erythema, edema, exudate or lesions  Neck  Normal range of motion  Neck supple  Cardiovascular  Auscultation of the heart: Normal rate and rhythm, normal S1 and S2 without murmurs  Examination of the extremities for edema and/or varicosities: Normal  Pulmonary/Chest  Respiratory effort: No increased work of breathing or signs of respiratory distress  Auscultation of lungs: Clear to auscultation, equal breath sounds bilaterally, no wheezes, rales, no rhonchi  Abdomen  Abdomen: Non-tender, no masses  Liver and spleen: No hepatomegaly or splenomegaly  Musculoskeletal  Gait and station: normal   Digits and Nails: normal without clubbing or cyanosis  Inspection/palpation of joints, bones, and muscles: Normal  Neurological  No nystagmus or asterixis  Skin  Skin and subcutaneous tissue: Normal without rashes or lesions  Multiple tattoos  Lymphatic  Palpation of the lymph nodes in neck: No lymphadenopathy     Psychiatric  Orientation to person, place and time: Normal   Mood and affect: Normal          Labs:   No results found for: ALT, AST, BUN, CALCIUM, CL, CHOL, CO2, CREATININE, Calleen Law, HDL, HCT, HGB, HGBA1C, LDL, MG, PHOS, PLT, K, PSA, NA, TRIG, WBC      X-Rays & Procedures:   No orders to display         ______________________________________________________________________      Assessment & Plan:      Diagnoses and all orders for this visit:    Diarrhea, unspecified type  -     Celiac Disease Antibody Profile; Future    Irregular bowel habits  -     Ambulatory referral to Gastroenterology    Blood in stool  -     Ambulatory referral to Gastroenterology    Elevated alkaline phosphatase in   -     Alkaline phosphatase, isoenzymes; Future  -     Gamma GT; Future      I Have taken liberty of scheduling the patient for colonoscopy  I have given her a copy of a lactose-free diet to follow  We will obtain fractionated alkaline phosphatase and gamma GT  We will also obtain a celiac panel  I will be happy to inform you of her results and any further recommendations  I would like to thank you for allowing me to participate in her care                With warmest regards,    Dario Carreno MD, [facility]

## 2020-03-05 NOTE — PROGRESS NOTES
[facility]    Dear Dr Corinne Hoar     I had the pleasure of seeing your patient Charlotte Fontaine in the office today and I thank you for this kind referral        Chief Complaint:  Diarrhea      HPI:  Charlotte Fontaine is a 25 y o  female who presents with diarrhea  According to the patient this is been going on for quite some time  It appears to be due to certain types of food particularly lactose products and eggs  However it also occurs outside of these  She has never had an actual copy of a lactose-free diet  She gets severe urgency and then has multiple loose stools  These and not watery  She does get rectal bleeding mostly on the toilet paper  She does have nocturnal symptomatology  She has no exertional symptomatology  There is no incontinence  The patient has never had a colonoscopy  She had an EGD done approximately 6 years ago which according to her was negative  She does not have any fever or chills  There is no other apparent exacerbating or remitting factor  The patient notes also indicate there is an elevation of her alkaline phosphatase  I had asked the patient if she had ever had any liver issues and she said there was an ultrasound which showed an enlarged liver but that was as far as it went  She has never any hepatitis  No other antecedent liver disease  Her last alkaline phosphatase was 158  Platelet count 817  Hemoglobin and hematocrit are normal   There is no other contributory GI history         Review of Systems:   Constitutional: No fever or chills, feels well, no tiredness, no recent weight gain or weight loss  HENT: No complaints of earache, no hearing loss, no nosebleeds, no nasal discharge, no sore throat, no hoarseness  Eyes: No complaints of eye pain, no red eyes, no discharge from eyes, no itchy eyes    Cardiovascular: No complaints of slow heart rate, no fast heart rate, no chest pain, no palpitations, no leg claudication, no lower extremity edema  Respiratory: No complaints of shortness of breath, no wheezing, no cough, no SOB on exertion, no orthopnea  Gastrointestinal: As noted in HPI  Genitourinary: No complaints of dysuria, no incontinence, no hesitancy, no nocturia  Musculoskeletal: No complaints of arthralgia, no myalgias, no joint swelling or stiffness, no limb pain or swelling  Neurological: No complaints of headache, no confusion, no convulsions, no numbness or tingling, no dizziness or fainting, no limb weakness, no difficulty walking  Skin: No complaints of skin rash or skin lesions, no itching, no skin wound, no dry skin  Hematological/Lymphatic: No complaints of swollen glands, does not bleed easy  Allergic/Immunologic: No immunocompromised state  Endocrine:  No complaints of polyuria, no polydipsia  Psychiatric/Behavioral:  no change in personality, positive PTSD, positive emotional problems, bipolar disorder         Historical Information   Past Medical History:   Diagnosis Date    Bipolar disorder ([place] [address] )     Chronic kidney disease     Depression     Hyperlipidemia 1/13/2020    Self-injurious behavior     Suicide attempt ([place] [address] )     2016     Past Surgical History:   Procedure Laterality Date    NO PAST SURGERIES       Social History   Social History     Substance and Sexual Activity   Alcohol Use Yes    Frequency: Monthly or less    Drinks per session: 1 or 2    Binge frequency: Never    Comment: 1-2 mixed drinks once monthly or less     Social History     Substance and Sexual Activity   Drug Use Never     Social History     Tobacco Use   Smoking Status Never Smoker   Smokeless Tobacco Never Used     Family History   Problem Relation Age of Onset    Cervical cancer Mother     Heart attack Mother     Anxiety disorder Mother     Depression Mother     Diverticulitis Mother     Heart attack Father     Hypertension Father     Hyperlipidemia Father     No Known Problems Brother     Dementia Maternal Grandmother     Alzheimer's disease Maternal Grandmother     Throat cancer Paternal Grandfather     Schizophrenia Maternal Aunt          Current Medications: has a current medication list which includes the following prescription(s): alprazolam, atenolol, paroxetine, quetiapine, and simvastatin  Vital Signs: /88   Pulse (!) 122   Wt 120 kg (264 lb)   BMI 45 32 kg/m²     Physical Exam:   Constitutional  General Appearance: No acute distress, well appearing and well nourished, central obesity  Head  Normocephalic  Eyes  Conjunctivae and lids: No swelling, erythema, or discharge  Pupils and irises: Equal, round and reactive to light  Ears, Nose, Mouth, and Throat  External inspection of ears and nose: Normal  Nasal mucosa, septum and turbinates: Normal without edema or erythema/   Oropharynx: Normal with no erythema, edema, exudate or lesions  Neck  Normal range of motion  Neck supple  Cardiovascular  Auscultation of the heart: Normal rate and rhythm, normal S1 and S2 without murmurs  Examination of the extremities for edema and/or varicosities: Normal  Pulmonary/Chest  Respiratory effort: No increased work of breathing or signs of respiratory distress  Auscultation of lungs: Clear to auscultation, equal breath sounds bilaterally, no wheezes, rales, no rhonchi  Abdomen  Abdomen: Non-tender, no masses  Liver and spleen: No hepatomegaly or splenomegaly  Musculoskeletal  Gait and station: normal   Digits and Nails: normal without clubbing or cyanosis  Inspection/palpation of joints, bones, and muscles: Normal  Neurological  No nystagmus or asterixis  Skin  Skin and subcutaneous tissue: Normal without rashes or lesions  Multiple tattoos  Lymphatic  Palpation of the lymph nodes in neck: No lymphadenopathy     Psychiatric  Orientation to person, place and time: Normal   Mood and affect: Normal          Labs:   No results found for: ALT, AST, BUN, CALCIUM, CL, CHOL, CO2, CREATININE, Thereasa Rohith, HDL, HCT, HGB, HGBA1C, LDL, MG, PHOS, PLT, K, PSA, NA, TRIG, WBC      X-Rays & Procedures:   No orders to display         ______________________________________________________________________      Assessment & Plan:      Diagnoses and all orders for this visit:    Diarrhea, unspecified type  -     Celiac Disease Antibody Profile; Future    Irregular bowel habits  -     Ambulatory referral to Gastroenterology    Blood in stool  -     Ambulatory referral to Gastroenterology    Elevated alkaline phosphatase in   -     Alkaline phosphatase, isoenzymes; Future  -     Gamma GT; Future      I Have taken liberty of scheduling the patient for colonoscopy  I have given her a copy of a lactose-free diet to follow  We will obtain fractionated alkaline phosphatase and gamma GT  We will also obtain a celiac panel  I will be happy to inform you of her results and any further recommendations  I would like to thank you for allowing me to participate in her care                With warmest regards,    Sierra Osorio MD, [facility]

## 2020-03-09 DIAGNOSIS — R00.0 TACHYCARDIA: ICD-10-CM

## 2020-03-09 RX ORDER — ATENOLOL 25 MG/1
TABLET ORAL
Qty: 30 TABLET | Refills: 2 | OUTPATIENT
Start: 2020-03-09

## 2020-03-10 ENCOUNTER — CONSULT (OUTPATIENT)
Dept: OBGYN CLINIC | Facility: CLINIC | Age: 25
End: 2020-03-10
Payer: COMMERCIAL

## 2020-03-10 VITALS
HEART RATE: 121 BPM | DIASTOLIC BLOOD PRESSURE: 77 MMHG | BODY MASS INDEX: 47.73 KG/M2 | HEIGHT: 63 IN | SYSTOLIC BLOOD PRESSURE: 116 MMHG | WEIGHT: 269.4 LBS

## 2020-03-10 DIAGNOSIS — M67.432 GANGLION CYST OF VOLAR ASPECT OF LEFT WRIST: Primary | ICD-10-CM

## 2020-03-10 PROCEDURE — 99243 OFF/OP CNSLTJ NEW/EST LOW 30: CPT | Performed by: ORTHOPAEDIC SURGERY

## 2020-03-10 PROCEDURE — 3008F BODY MASS INDEX DOCD: CPT | Performed by: PHYSICIAN ASSISTANT

## 2020-03-10 PROCEDURE — 20612 ASPIRATE/INJ GANGLION CYST: CPT | Performed by: ORTHOPAEDIC SURGERY

## 2020-03-10 RX ORDER — LIDOCAINE HYDROCHLORIDE 10 MG/ML
0.5 INJECTION, SOLUTION INFILTRATION; PERINEURAL
Status: COMPLETED | OUTPATIENT
Start: 2020-03-10 | End: 2020-03-10

## 2020-03-10 RX ORDER — TRIAMCINOLONE ACETONIDE 40 MG/ML
20 INJECTION, SUSPENSION INTRA-ARTICULAR; INTRAMUSCULAR
Status: COMPLETED | OUTPATIENT
Start: 2020-03-10 | End: 2020-03-10

## 2020-03-10 RX ADMIN — LIDOCAINE HYDROCHLORIDE 0.5 ML: 10 INJECTION, SOLUTION INFILTRATION; PERINEURAL at 09:19

## 2020-03-10 RX ADMIN — TRIAMCINOLONE ACETONIDE 20 MG: 40 INJECTION, SUSPENSION INTRA-ARTICULAR; INTRAMUSCULAR at 09:19

## 2020-03-10 NOTE — PROGRESS NOTES
CHIEF COMPLAINT:  Chief Complaint   Patient presents with    Left Wrist - Pain       SUBJECTIVE:  Mally Gutierres is a 25y o  year old RHD female who presents to the office for evaluation of her left wrist   Patient was referred by her PCP for a lump on the volar radial aspect of her left wrist   Patient states that this mass has been there for about 6 months  Patient states that this does cause her pain which increases with activity  Pt states that the size of the lump also fluctuates  Pt states that it itches at times as well  Pt is not currently taking anything for pain         PAST MEDICAL HISTORY:  Past Medical History:   Diagnosis Date    [facility]ipolar disorder ([facility] )     Chronic kidney disease     Depression     Hyperlipidemia 1/13/2020    Self-injurious behavior     Suicide attempt ([facility] )     2016       PAST SURGICAL HISTORY:  Past Surgical History:   Procedure Laterality Date    NO PAST SURGERIES         FAMILY HISTORY:  Family History   Problem Relation Age of Onset    Cervical cancer Mother     Heart attack Mother     Anxiety disorder Mother     Depression Mother     Diverticulitis Mother     Heart attack Father     Hypertension Father     Hyperlipidemia Father     No Known Problems Brother     Dementia Maternal Grandmother     Alzheimer's disease Maternal Grandmother     Throat cancer Paternal Grandfather     Schizophrenia Maternal Aunt        SOCIAL HISTORY:  Social History     Tobacco Use    Smoking status: Never Smoker    Smokeless tobacco: Never Used   Substance Use Topics    Alcohol use: Yes     Frequency: Monthly or less     Drinks per session: 1 or 2     Binge frequency: Never     Comment: 1-2 mixed drinks once monthly or less    Drug use: Never       MEDICATIONS:    Current Outpatient Medications:     ALPRAZolam (XANAX) 0 25 mg tablet, Take by mouth daily at bedtime as needed for anxiety, Disp: , Rfl:     atenolol (TENORMIN) 25 mg tablet, Take 1 tablet (25 mg total) by mouth daily, Disp: 30 tablet, Rfl: 2    PARoxetine (PAXIL) 10 mg tablet, Take 1 5 tablets (15 mg total) by mouth daily, Disp: 45 tablet, Rfl: 2    [START ON 4/13/2020] QUEtiapine (SEROquel) 50 mg tablet, Take 1 tablet (50 mg total) by mouth 2 (two) times a day, Disp: 60 tablet, Rfl: 2    simvastatin (ZOCOR) 20 mg tablet, Take 20 mg by mouth daily at bedtime, Disp: , Rfl: 1    ALLERGIES:  No Known Allergies    REVIEW OF SYSTEMS:  Review of Systems   Constitutional: Negative for chills, fever and unexpected weight change  HENT: Negative for hearing loss, nosebleeds and sore throat  Eyes: Negative for pain, redness and visual disturbance  Respiratory: Negative for cough, shortness of breath and wheezing  Cardiovascular: Negative for chest pain, palpitations and leg swelling  Gastrointestinal: Negative for abdominal pain, nausea and vomiting  Endocrine: Negative for polydipsia and polyuria  Genitourinary: Negative for dysuria and hematuria  Skin: Negative for rash and wound  Neurological: Negative for dizziness and headaches  Psychiatric/Behavioral: Negative for decreased concentration, dysphoric mood and suicidal ideas  The patient is not nervous/anxious  VITALS:  Vitals:    03/10/20 0847   BP: 116/77   Pulse: (!) 121       LABS:  HgA1c: No results found for: HGBA1C  BMP: No results found for: GLUCOSE, CALCIUM, NA, K, CO2, CL, BUN, CREATININE    _____________________________________________________  PHYSICAL EXAMINATION:  General: well developed and well nourished, alert, oriented times 3 and appears comfortable  Psychiatric: Normal  HEENT: Trachea Midline, No torticollis  Pulmonary: No audible wheezing or strider  Cardiovascular: No discernable arrhythmia   Skin: No masses, erythema, lacerations, fluctation, ulcerations  Neurovascular:  Motor Intact to the Median, Ulnar, Radial Nerve and Pulses Intact    MUSCULOSKELETAL EXAMINATION:  Positive cyst like structure on the vloar radial aspect of left wrist  measuring sub centameter   Skin is not translucent  There is not skin thinning  Skin is intact  No signs of infection  ___________________________________________________  STUDIES REVIEWED:  No studies reviewed  PROCEDURES PERFORMED:  Hand/upper extremity injection: L wrist  Date/Time: 3/10/2020 9:19 AM  Consent given by: patient  Site marked: site marked  Timeout: Immediately prior to procedure a time out was called to verify the correct patient, procedure, equipment, support staff and site/side marked as required   Supporting Documentation  Indications: pain   Procedure Details  Condition:volar carpal ganglion Site: L wrist   Preparation: Patient was prepped and draped in the usual sterile fashion  Ultrasound guidance: no  Medications administered: 0 5 mL lidocaine 1 %; 20 mg triamcinolone acetonide 40 mg/mL    Patient tolerance: patient tolerated the procedure well with no immediate complications  Dressing:  Sterile dressing applied             _____________________________________________________  ASSESSMENT/PLAN:    Left wrist volar radial  Ganglion  * cortisone steroid injection was offered and accepted by patient  * cortisone steroid injection was administered without complications  * patient was advised that the cyst can return although if it returns and is asymptomatic nothing needs to be done at that time  * if the cyst returns and causes her discomfort we can consider excision of the cyst      Follow Up:  No follow-ups on file          To Do Next Visit:  Re-evaluation of current issue        Scribe Attestation    I,:   Dorota Giordano am acting as a scribe while in the presence of the attending physician :        I,:   Altagracia Joel MD personally performed the services described in this documentation    as scribed in my presence :

## 2020-03-12 ENCOUNTER — ANESTHESIA EVENT (OUTPATIENT)
Dept: GASTROENTEROLOGY | Facility: HOSPITAL | Age: 25
End: 2020-03-12

## 2020-03-12 NOTE — ANESTHESIA PREPROCEDURE EVALUATION
Review of Systems/Medical History  Patient summary reviewed  Chart reviewed  No history of anesthetic complications     Cardiovascular  Hyperlipidemia,    Pulmonary       GI/Hepatic      Comment: Irregular bowel habits       Blood in stool       Diarrhea, unspecified type       Elevated alkaline phosphatase in           Endo/Other     GYN       Hematology   Musculoskeletal       Neurology   Psychology   Anxiety, Depression ,              Physical Exam    Airway    Mallampati score: II  TM Distance: >3 FB  Neck ROM: full     Dental   No notable dental hx     Cardiovascular  Cardiovascular exam normal    Pulmonary  Pulmonary exam normal Breath sounds clear to auscultation,     Other Findings        Anesthesia Plan  ASA Score- 2     Anesthesia Type- IV sedation with anesthesia with ASA Monitors  Additional Monitors:   Airway Plan:         Plan Factors- Patient instructed to abstain from smoking on day of procedure       Induction- intravenous  Postoperative Plan-     Informed Consent- Anesthetic plan and risks discussed with patient  I personally reviewed this patient with the CRNA  Discussed and agreed on the Anesthesia Plan with the CRNA  Leslee Fischer No results found for: WBC, HGB, HCT, MCV, PLT  No results found for: GLUCOSE, CALCIUM, NA, K, CO2, CL, BUN, CREATININE  No results found for: INR, PROTIME  No results found for: PTT          Discussed with pt the benefits/alternatives and risks or General Anesthesia including breathing tube remaining in place if not strong enough, PONV, damage to lips and teeth, sore throat, eye injury or blindness    I, Dr Kimmie Murray, the attending physician, have personally seen and evaluated the patient prior to anesthetic care  I have reviewed the pre-anesthetic record, and other medical records if appropriate to the anesthetic care  If a CRNA is involved in the case, I have reviewed the CRNA assessment, if present, and agree   The patient is in a suitable condition to proceed with my formulated anesthetic plan

## 2020-03-14 ENCOUNTER — HOSPITAL ENCOUNTER (OUTPATIENT)
Dept: GASTROENTEROLOGY | Facility: HOSPITAL | Age: 25
Setting detail: OUTPATIENT SURGERY
Discharge: HOME | End: 2020-03-14
Attending: INTERNAL MEDICINE
Payer: COMMERCIAL

## 2020-03-14 ENCOUNTER — ANESTHESIA (OUTPATIENT)
Dept: GASTROENTEROLOGY | Facility: HOSPITAL | Age: 25
End: 2020-03-14

## 2020-03-14 VITALS
TEMPERATURE: 99.1 F | HEART RATE: 88 BPM | BODY MASS INDEX: 46.33 KG/M2 | RESPIRATION RATE: 18 BRPM | OXYGEN SATURATION: 96 % | DIASTOLIC BLOOD PRESSURE: 90 MMHG | WEIGHT: 261.47 LBS | HEIGHT: 63 IN | SYSTOLIC BLOOD PRESSURE: 130 MMHG

## 2020-03-14 DIAGNOSIS — R74.8 ELEVATED ALKALINE PHOSPHATASE IN NEWBORN: ICD-10-CM

## 2020-03-14 DIAGNOSIS — K92.1 BLOOD IN STOOL: ICD-10-CM

## 2020-03-14 DIAGNOSIS — R19.7 DIARRHEA, UNSPECIFIED TYPE: ICD-10-CM

## 2020-03-14 DIAGNOSIS — R19.8 IRREGULAR BOWEL HABITS: ICD-10-CM

## 2020-03-14 LAB
EXT PREGNANCY TEST URINE: NEGATIVE
EXT. CONTROL: NORMAL

## 2020-03-14 PROCEDURE — 88305 TISSUE EXAM BY PATHOLOGIST: CPT | Performed by: PATHOLOGY

## 2020-03-14 PROCEDURE — 45380 COLONOSCOPY AND BIOPSY: CPT | Performed by: INTERNAL MEDICINE

## 2020-03-14 PROCEDURE — 81025 URINE PREGNANCY TEST: CPT | Performed by: INTERNAL MEDICINE

## 2020-03-14 RX ORDER — SODIUM CHLORIDE, SODIUM LACTATE, POTASSIUM CHLORIDE, CALCIUM CHLORIDE 600; 310; 30; 20 MG/100ML; MG/100ML; MG/100ML; MG/100ML
125 INJECTION, SOLUTION INTRAVENOUS CONTINUOUS
Status: DISCONTINUED | OUTPATIENT
Start: 2020-03-14 | End: 2020-03-18 | Stop reason: HOSPADM

## 2020-03-14 RX ORDER — SODIUM CHLORIDE, SODIUM LACTATE, POTASSIUM CHLORIDE, CALCIUM CHLORIDE 600; 310; 30; 20 MG/100ML; MG/100ML; MG/100ML; MG/100ML
INJECTION, SOLUTION INTRAVENOUS CONTINUOUS PRN
Status: DISCONTINUED | OUTPATIENT
Start: 2020-03-14 | End: 2020-03-14 | Stop reason: SURG

## 2020-03-14 RX ORDER — PROPOFOL 10 MG/ML
INJECTION, EMULSION INTRAVENOUS AS NEEDED
Status: DISCONTINUED | OUTPATIENT
Start: 2020-03-14 | End: 2020-03-14 | Stop reason: SURG

## 2020-03-14 RX ADMIN — PROPOFOL 100 MG: 10 INJECTION, EMULSION INTRAVENOUS at 11:16

## 2020-03-14 RX ADMIN — PROPOFOL 100 MG: 10 INJECTION, EMULSION INTRAVENOUS at 11:08

## 2020-03-14 RX ADMIN — SODIUM CHLORIDE, SODIUM LACTATE, POTASSIUM CHLORIDE, AND CALCIUM CHLORIDE: .6; .31; .03; .02 INJECTION, SOLUTION INTRAVENOUS at 11:07

## 2020-03-14 RX ADMIN — PROPOFOL 100 MG: 10 INJECTION, EMULSION INTRAVENOUS at 11:10

## 2020-03-14 RX ADMIN — PROPOFOL 100 MG: 10 INJECTION, EMULSION INTRAVENOUS at 11:13

## 2020-03-14 NOTE — ANESTHESIA POSTPROCEDURE EVALUATION
Post-Op Assessment Note    CV Status:  Stable  Pain Score: 1    Pain management: adequate     Mental Status:  Alert   Hydration Status:  Stable   PONV Controlled:  Controlled   Airway Patency:  Patent   Post Op Vitals Reviewed: Yes      Staff: Anesthesiologist, CRNA   Comments: PT woke up with nasal trumpet in place and started to gag   trumpet removed, vvs          BP      Temp      Pulse     Resp      SpO2

## 2020-03-14 NOTE — INTERVAL H&P NOTE
H&P reviewed  After examining the patient I find no changes in the patients condition since the H&P had been written      Vitals:    03/14/20 1025   BP: 116/73   Pulse: 105   Resp: 20   Temp: 97 9 °F (36 6 °C)   SpO2: 96%

## 2020-04-07 ENCOUNTER — TELEMEDICINE (OUTPATIENT)
Dept: PSYCHIATRY | Facility: CLINIC | Age: 25
End: 2020-04-07
Payer: COMMERCIAL

## 2020-04-07 DIAGNOSIS — F41.1 GAD (GENERALIZED ANXIETY DISORDER): ICD-10-CM

## 2020-04-07 DIAGNOSIS — F31.30 BIPOLAR AFFECTIVE DISORDER, CURRENT EPISODE DEPRESSED, CURRENT EPISODE SEVERITY UNSPECIFIED (HCC): ICD-10-CM

## 2020-04-07 DIAGNOSIS — F42.2 MIXED OBSESSIONAL THOUGHTS AND ACTS: Primary | ICD-10-CM

## 2020-04-07 DIAGNOSIS — F43.10 PTSD (POST-TRAUMATIC STRESS DISORDER): ICD-10-CM

## 2020-04-07 PROCEDURE — 99214 OFFICE O/P EST MOD 30 MIN: CPT | Performed by: PHYSICIAN ASSISTANT

## 2020-04-07 RX ORDER — ALPRAZOLAM 0.25 MG/1
0.25 TABLET ORAL DAILY PRN
Qty: 30 TABLET | Refills: 0 | Status: SHIPPED | OUTPATIENT
Start: 2020-04-07 | End: 2020-05-15

## 2020-04-07 RX ORDER — PAROXETINE HYDROCHLORIDE 20 MG/1
20 TABLET, FILM COATED ORAL DAILY
Qty: 30 TABLET | Refills: 1 | Status: SHIPPED | OUTPATIENT
Start: 2020-04-07 | End: 2020-05-15

## 2020-04-13 ENCOUNTER — TELEMEDICINE (OUTPATIENT)
Dept: BEHAVIORAL/MENTAL HEALTH CLINIC | Facility: CLINIC | Age: 25
End: 2020-04-13
Payer: COMMERCIAL

## 2020-04-13 DIAGNOSIS — F31.30 BIPOLAR AFFECTIVE DISORDER, CURRENT EPISODE DEPRESSED, CURRENT EPISODE SEVERITY UNSPECIFIED (HCC): ICD-10-CM

## 2020-04-13 DIAGNOSIS — F41.1 GAD (GENERALIZED ANXIETY DISORDER): ICD-10-CM

## 2020-04-13 DIAGNOSIS — F43.10 PTSD (POST-TRAUMATIC STRESS DISORDER): Primary | ICD-10-CM

## 2020-04-13 PROCEDURE — 90837 PSYTX W PT 60 MINUTES: CPT | Performed by: SOCIAL WORKER

## 2020-04-14 ENCOUNTER — APPOINTMENT (OUTPATIENT)
Dept: LAB | Facility: MEDICAL CENTER | Age: 25
End: 2020-04-14
Payer: COMMERCIAL

## 2020-04-14 DIAGNOSIS — Z11.4 ENCOUNTER FOR SCREENING FOR HIV: ICD-10-CM

## 2020-04-14 DIAGNOSIS — F31.31 BIPOLAR AFFECTIVE DISORDER, CURRENTLY DEPRESSED, MILD (HCC): ICD-10-CM

## 2020-04-14 DIAGNOSIS — R19.7 DIARRHEA, UNSPECIFIED TYPE: ICD-10-CM

## 2020-04-14 DIAGNOSIS — R74.8 ELEVATED ALKALINE PHOSPHATASE IN NEWBORN: ICD-10-CM

## 2020-04-14 DIAGNOSIS — R63.5 WEIGHT GAIN: ICD-10-CM

## 2020-04-14 DIAGNOSIS — E78.2 MIXED HYPERLIPIDEMIA: ICD-10-CM

## 2020-04-14 DIAGNOSIS — R00.0 TACHYCARDIA: ICD-10-CM

## 2020-04-14 LAB
ALBUMIN SERPL BCP-MCNC: 3.6 G/DL (ref 3.5–5)
ALP SERPL-CCNC: 137 U/L (ref 46–116)
ALT SERPL W P-5'-P-CCNC: 67 U/L (ref 12–78)
ANION GAP SERPL CALCULATED.3IONS-SCNC: 9 MMOL/L (ref 4–13)
AST SERPL W P-5'-P-CCNC: 32 U/L (ref 5–45)
BASOPHILS # BLD AUTO: 0.04 THOUSANDS/ΜL (ref 0–0.1)
BASOPHILS NFR BLD AUTO: 0 % (ref 0–1)
BILIRUB SERPL-MCNC: 0.39 MG/DL (ref 0.2–1)
BUN SERPL-MCNC: 11 MG/DL (ref 5–25)
CALCIUM SERPL-MCNC: 9 MG/DL (ref 8.3–10.1)
CHLORIDE SERPL-SCNC: 102 MMOL/L (ref 100–108)
CHOLEST SERPL-MCNC: 264 MG/DL (ref 50–200)
CO2 SERPL-SCNC: 24 MMOL/L (ref 21–32)
CREAT SERPL-MCNC: 0.78 MG/DL (ref 0.6–1.3)
EOSINOPHIL # BLD AUTO: 0.17 THOUSAND/ΜL (ref 0–0.61)
EOSINOPHIL NFR BLD AUTO: 1 % (ref 0–6)
ERYTHROCYTE [DISTWIDTH] IN BLOOD BY AUTOMATED COUNT: 14.2 % (ref 11.6–15.1)
EST. AVERAGE GLUCOSE BLD GHB EST-MCNC: 108 MG/DL
GFR SERPL CREATININE-BSD FRML MDRD: 107 ML/MIN/1.73SQ M
GGT SERPL-CCNC: 164 U/L (ref 5–85)
GLUCOSE P FAST SERPL-MCNC: 89 MG/DL (ref 65–99)
HBA1C MFR BLD: 5.4 %
HCT VFR BLD AUTO: 44 % (ref 34.8–46.1)
HDLC SERPL-MCNC: 32 MG/DL
HGB BLD-MCNC: 13.8 G/DL (ref 11.5–15.4)
IMM GRANULOCYTES # BLD AUTO: 0.05 THOUSAND/UL (ref 0–0.2)
IMM GRANULOCYTES NFR BLD AUTO: 0 % (ref 0–2)
LDLC SERPL DIRECT ASSAY-MCNC: 91 MG/DL (ref 0–100)
LYMPHOCYTES # BLD AUTO: 3.79 THOUSANDS/ΜL (ref 0.6–4.47)
LYMPHOCYTES NFR BLD AUTO: 29 % (ref 14–44)
MCH RBC QN AUTO: 27.4 PG (ref 26.8–34.3)
MCHC RBC AUTO-ENTMCNC: 31.4 G/DL (ref 31.4–37.4)
MCV RBC AUTO: 88 FL (ref 82–98)
MONOCYTES # BLD AUTO: 0.64 THOUSAND/ΜL (ref 0.17–1.22)
MONOCYTES NFR BLD AUTO: 5 % (ref 4–12)
NEUTROPHILS # BLD AUTO: 8.42 THOUSANDS/ΜL (ref 1.85–7.62)
NEUTS SEG NFR BLD AUTO: 65 % (ref 43–75)
NRBC BLD AUTO-RTO: 0 /100 WBCS
PLATELET # BLD AUTO: 514 THOUSANDS/UL (ref 149–390)
PMV BLD AUTO: 10.3 FL (ref 8.9–12.7)
POTASSIUM SERPL-SCNC: 3.8 MMOL/L (ref 3.5–5.3)
PROT SERPL-MCNC: 7.9 G/DL (ref 6.4–8.2)
RBC # BLD AUTO: 5.03 MILLION/UL (ref 3.81–5.12)
SODIUM SERPL-SCNC: 135 MMOL/L (ref 136–145)
T4 FREE SERPL-MCNC: 1 NG/DL (ref 0.76–1.46)
TRIGL SERPL-MCNC: 566 MG/DL
TSH SERPL DL<=0.05 MIU/L-ACNC: 4.09 UIU/ML (ref 0.36–3.74)
WBC # BLD AUTO: 13.11 THOUSAND/UL (ref 4.31–10.16)

## 2020-04-14 PROCEDURE — 82784 ASSAY IGA/IGD/IGG/IGM EACH: CPT

## 2020-04-14 PROCEDURE — 84075 ASSAY ALKALINE PHOSPHATASE: CPT

## 2020-04-14 PROCEDURE — 83721 ASSAY OF BLOOD LIPOPROTEIN: CPT

## 2020-04-14 PROCEDURE — 86255 FLUORESCENT ANTIBODY SCREEN: CPT

## 2020-04-14 PROCEDURE — 84080 ASSAY ALKALINE PHOSPHATASES: CPT

## 2020-04-14 PROCEDURE — 80053 COMPREHEN METABOLIC PANEL: CPT

## 2020-04-14 PROCEDURE — 85025 COMPLETE CBC W/AUTO DIFF WBC: CPT

## 2020-04-14 PROCEDURE — 83036 HEMOGLOBIN GLYCOSYLATED A1C: CPT

## 2020-04-14 PROCEDURE — 84443 ASSAY THYROID STIM HORMONE: CPT

## 2020-04-14 PROCEDURE — 83516 IMMUNOASSAY NONANTIBODY: CPT

## 2020-04-14 PROCEDURE — 87389 HIV-1 AG W/HIV-1&-2 AB AG IA: CPT

## 2020-04-14 PROCEDURE — 36415 COLL VENOUS BLD VENIPUNCTURE: CPT

## 2020-04-14 PROCEDURE — 82977 ASSAY OF GGT: CPT

## 2020-04-14 PROCEDURE — 84439 ASSAY OF FREE THYROXINE: CPT

## 2020-04-14 PROCEDURE — 80061 LIPID PANEL: CPT

## 2020-04-15 LAB
ENDOMYSIUM IGA SER QL: NEGATIVE
GLIADIN PEPTIDE IGA SER-ACNC: 7 UNITS (ref 0–19)
GLIADIN PEPTIDE IGG SER-ACNC: 2 UNITS (ref 0–19)
HIV 1+2 AB+HIV1 P24 AG SERPL QL IA: NORMAL
IGA SERPL-MCNC: 211 MG/DL (ref 87–352)
TTG IGA SER-ACNC: <2 U/ML (ref 0–3)
TTG IGG SER-ACNC: 3 U/ML (ref 0–5)

## 2020-04-16 LAB
ALP BONE CFR SERPL: 33 % (ref 14–68)
ALP INTEST CFR SERPL: 2 % (ref 0–18)
ALP LIVER CFR SERPL: 65 % (ref 18–85)
ALP SERPL-CCNC: 134 IU/L (ref 39–117)

## 2020-04-28 ENCOUNTER — TELEPHONE (OUTPATIENT)
Dept: FAMILY MEDICINE CLINIC | Facility: CLINIC | Age: 25
End: 2020-04-28

## 2020-05-01 ENCOUNTER — TELEMEDICINE (OUTPATIENT)
Dept: FAMILY MEDICINE CLINIC | Facility: CLINIC | Age: 25
End: 2020-05-01
Payer: COMMERCIAL

## 2020-05-01 DIAGNOSIS — F41.1 GAD (GENERALIZED ANXIETY DISORDER): ICD-10-CM

## 2020-05-01 DIAGNOSIS — F43.10 PTSD (POST-TRAUMATIC STRESS DISORDER): ICD-10-CM

## 2020-05-01 DIAGNOSIS — R06.81 APNEIC EPISODE: ICD-10-CM

## 2020-05-01 DIAGNOSIS — D75.839 THROMBOCYTOSIS: ICD-10-CM

## 2020-05-01 DIAGNOSIS — D72.828 OTHER ELEVATED WHITE BLOOD CELL (WBC) COUNT: ICD-10-CM

## 2020-05-01 DIAGNOSIS — R74.8 ELEVATED ALKALINE PHOSPHATASE LEVEL: ICD-10-CM

## 2020-05-01 DIAGNOSIS — F42.2 MIXED OBSESSIONAL THOUGHTS AND ACTS: ICD-10-CM

## 2020-05-01 DIAGNOSIS — E03.8 SUBCLINICAL HYPOTHYROIDISM: ICD-10-CM

## 2020-05-01 DIAGNOSIS — E78.2 MIXED HYPERLIPIDEMIA: Primary | ICD-10-CM

## 2020-05-01 PROBLEM — K92.1 BLOOD IN STOOL: Status: RESOLVED | Noted: 2020-01-13 | Resolved: 2020-05-01

## 2020-05-01 PROBLEM — D72.829 LEUKOCYTOSIS: Status: ACTIVE | Noted: 2020-05-01

## 2020-05-01 PROCEDURE — 99214 OFFICE O/P EST MOD 30 MIN: CPT | Performed by: FAMILY MEDICINE

## 2020-05-01 RX ORDER — ATORVASTATIN CALCIUM 40 MG/1
40 TABLET, FILM COATED ORAL
Qty: 30 TABLET | Refills: 5 | Status: SHIPPED | OUTPATIENT
Start: 2020-05-01 | End: 2020-09-03

## 2020-05-02 RX ORDER — PAROXETINE HYDROCHLORIDE 20 MG/1
TABLET, FILM COATED ORAL
Qty: 30 TABLET | Refills: 1 | OUTPATIENT
Start: 2020-05-02

## 2020-05-06 DIAGNOSIS — F31.30 BIPOLAR AFFECTIVE DISORDER, CURRENT EPISODE DEPRESSED, CURRENT EPISODE SEVERITY UNSPECIFIED (HCC): ICD-10-CM

## 2020-05-15 ENCOUNTER — TELEMEDICINE (OUTPATIENT)
Dept: PSYCHIATRY | Facility: CLINIC | Age: 25
End: 2020-05-15
Payer: COMMERCIAL

## 2020-05-15 DIAGNOSIS — F42.2 MIXED OBSESSIONAL THOUGHTS AND ACTS: ICD-10-CM

## 2020-05-15 DIAGNOSIS — F41.1 GAD (GENERALIZED ANXIETY DISORDER): Primary | ICD-10-CM

## 2020-05-15 DIAGNOSIS — F31.30 BIPOLAR AFFECTIVE DISORDER, CURRENT EPISODE DEPRESSED, CURRENT EPISODE SEVERITY UNSPECIFIED (HCC): ICD-10-CM

## 2020-05-15 DIAGNOSIS — F43.10 PTSD (POST-TRAUMATIC STRESS DISORDER): ICD-10-CM

## 2020-05-15 DIAGNOSIS — Z63.4 BEREAVEMENT: ICD-10-CM

## 2020-05-15 PROCEDURE — 99215 OFFICE O/P EST HI 40 MIN: CPT | Performed by: PHYSICIAN ASSISTANT

## 2020-05-15 RX ORDER — ALPRAZOLAM 0.25 MG/1
0.25 TABLET ORAL DAILY PRN
Qty: 30 TABLET | Refills: 2 | Status: SHIPPED | OUTPATIENT
Start: 2020-05-15 | End: 2020-07-17

## 2020-05-15 RX ORDER — QUETIAPINE FUMARATE 100 MG/1
TABLET, FILM COATED ORAL
Qty: 45 TABLET | Refills: 2 | Status: SHIPPED | OUTPATIENT
Start: 2020-05-15 | End: 2020-07-17

## 2020-05-15 RX ORDER — PAROXETINE HYDROCHLORIDE 20 MG/1
20 TABLET, FILM COATED ORAL DAILY
Qty: 30 TABLET | Refills: 1 | Status: SHIPPED | OUTPATIENT
Start: 2020-05-15 | End: 2020-06-22

## 2020-05-15 SDOH — SOCIAL STABILITY - SOCIAL INSECURITY: DISSAPEARANCE AND DEATH OF FAMILY MEMBER: Z63.4

## 2020-05-18 RX ORDER — QUETIAPINE FUMARATE 50 MG/1
TABLET, FILM COATED ORAL
Qty: 60 TABLET | Refills: 2 | OUTPATIENT
Start: 2020-05-18

## 2020-06-01 ENCOUNTER — TELEMEDICINE (OUTPATIENT)
Dept: BEHAVIORAL/MENTAL HEALTH CLINIC | Facility: CLINIC | Age: 25
End: 2020-06-01
Payer: COMMERCIAL

## 2020-06-01 DIAGNOSIS — F41.1 GAD (GENERALIZED ANXIETY DISORDER): ICD-10-CM

## 2020-06-01 DIAGNOSIS — F43.10 PTSD (POST-TRAUMATIC STRESS DISORDER): ICD-10-CM

## 2020-06-01 DIAGNOSIS — F31.30 BIPOLAR AFFECTIVE DISORDER, CURRENT EPISODE DEPRESSED, CURRENT EPISODE SEVERITY UNSPECIFIED (HCC): Primary | ICD-10-CM

## 2020-06-01 PROCEDURE — 90834 PSYTX W PT 45 MINUTES: CPT | Performed by: SOCIAL WORKER

## 2020-06-15 ENCOUNTER — TELEMEDICINE (OUTPATIENT)
Dept: BEHAVIORAL/MENTAL HEALTH CLINIC | Facility: CLINIC | Age: 25
End: 2020-06-15
Payer: COMMERCIAL

## 2020-06-15 DIAGNOSIS — F31.30 BIPOLAR AFFECTIVE DISORDER, CURRENT EPISODE DEPRESSED, CURRENT EPISODE SEVERITY UNSPECIFIED (HCC): ICD-10-CM

## 2020-06-15 DIAGNOSIS — F41.1 GAD (GENERALIZED ANXIETY DISORDER): Primary | ICD-10-CM

## 2020-06-15 PROCEDURE — 90834 PSYTX W PT 45 MINUTES: CPT | Performed by: SOCIAL WORKER

## 2020-06-22 DIAGNOSIS — F41.1 GAD (GENERALIZED ANXIETY DISORDER): ICD-10-CM

## 2020-06-22 DIAGNOSIS — F42.2 MIXED OBSESSIONAL THOUGHTS AND ACTS: ICD-10-CM

## 2020-06-22 DIAGNOSIS — F43.10 PTSD (POST-TRAUMATIC STRESS DISORDER): ICD-10-CM

## 2020-06-22 RX ORDER — PAROXETINE HYDROCHLORIDE 20 MG/1
TABLET, FILM COATED ORAL
Qty: 30 TABLET | Refills: 1 | Status: SHIPPED | OUTPATIENT
Start: 2020-06-22 | End: 2020-07-17

## 2020-07-06 DIAGNOSIS — F41.1 GAD (GENERALIZED ANXIETY DISORDER): ICD-10-CM

## 2020-07-06 DIAGNOSIS — F31.30 BIPOLAR AFFECTIVE DISORDER, CURRENT EPISODE DEPRESSED, CURRENT EPISODE SEVERITY UNSPECIFIED (HCC): ICD-10-CM

## 2020-07-06 RX ORDER — QUETIAPINE FUMARATE 100 MG/1
TABLET, FILM COATED ORAL
Qty: 45 TABLET | Refills: 2 | OUTPATIENT
Start: 2020-07-06

## 2020-07-06 RX ORDER — ALPRAZOLAM 0.25 MG/1
TABLET ORAL
Qty: 30 TABLET | OUTPATIENT
Start: 2020-07-06
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
## 2020-07-17 ENCOUNTER — TELEMEDICINE (OUTPATIENT)
Dept: PSYCHIATRY | Facility: CLINIC | Age: 25
End: 2020-07-17
Payer: COMMERCIAL

## 2020-07-17 DIAGNOSIS — F42.2 MIXED OBSESSIONAL THOUGHTS AND ACTS: ICD-10-CM

## 2020-07-17 DIAGNOSIS — F41.1 GAD (GENERALIZED ANXIETY DISORDER): ICD-10-CM

## 2020-07-17 DIAGNOSIS — F31.30 BIPOLAR AFFECTIVE DISORDER, CURRENT EPISODE DEPRESSED, CURRENT EPISODE SEVERITY UNSPECIFIED (HCC): ICD-10-CM

## 2020-07-17 DIAGNOSIS — F43.10 PTSD (POST-TRAUMATIC STRESS DISORDER): ICD-10-CM

## 2020-07-17 PROCEDURE — 99214 OFFICE O/P EST MOD 30 MIN: CPT | Performed by: PHYSICIAN ASSISTANT

## 2020-07-17 PROCEDURE — 1036F TOBACCO NON-USER: CPT | Performed by: PHYSICIAN ASSISTANT

## 2020-07-17 RX ORDER — QUETIAPINE FUMARATE 100 MG/1
TABLET, FILM COATED ORAL
Qty: 45 TABLET | Refills: 2 | Status: SHIPPED | OUTPATIENT
Start: 2020-07-17 | End: 2020-10-15 | Stop reason: SDUPTHER

## 2020-07-17 RX ORDER — ALPRAZOLAM 0.25 MG/1
0.25 TABLET ORAL DAILY PRN
Qty: 30 TABLET | Refills: 2 | Status: SHIPPED | OUTPATIENT
Start: 2020-07-17 | End: 2020-10-15 | Stop reason: SDUPTHER

## 2020-07-17 RX ORDER — PAROXETINE 30 MG/1
30 TABLET, FILM COATED ORAL DAILY
Qty: 30 TABLET | Refills: 2 | Status: SHIPPED | OUTPATIENT
Start: 2020-07-17 | End: 2020-10-15 | Stop reason: SDUPTHER

## 2020-07-22 ENCOUNTER — TELEMEDICINE (OUTPATIENT)
Dept: BEHAVIORAL/MENTAL HEALTH CLINIC | Facility: CLINIC | Age: 25
End: 2020-07-22
Payer: COMMERCIAL

## 2020-07-22 DIAGNOSIS — F41.1 GAD (GENERALIZED ANXIETY DISORDER): Primary | ICD-10-CM

## 2020-07-22 DIAGNOSIS — F31.30 BIPOLAR AFFECTIVE DISORDER, CURRENT EPISODE DEPRESSED, CURRENT EPISODE SEVERITY UNSPECIFIED (HCC): ICD-10-CM

## 2020-07-22 PROCEDURE — 1036F TOBACCO NON-USER: CPT | Performed by: SOCIAL WORKER

## 2020-07-22 PROCEDURE — 90834 PSYTX W PT 45 MINUTES: CPT | Performed by: SOCIAL WORKER
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
## 2020-08-05 ENCOUNTER — TELEMEDICINE (OUTPATIENT)
Dept: BEHAVIORAL/MENTAL HEALTH CLINIC | Facility: CLINIC | Age: 25
End: 2020-08-05
Payer: COMMERCIAL

## 2020-08-05 DIAGNOSIS — F31.30 BIPOLAR AFFECTIVE DISORDER, CURRENT EPISODE DEPRESSED, CURRENT EPISODE SEVERITY UNSPECIFIED (HCC): ICD-10-CM

## 2020-08-05 DIAGNOSIS — F41.1 GAD (GENERALIZED ANXIETY DISORDER): Primary | ICD-10-CM

## 2020-08-05 PROCEDURE — 90834 PSYTX W PT 45 MINUTES: CPT | Performed by: SOCIAL WORKER

## 2020-08-05 NOTE — PSYCH
Virtual Regular Visit      Assessment/Plan:    Problem List Items Addressed This Visit        Other    Bipolar affective disorder, current episode depressed ([address] )    SUSANNA (generalized anxiety disorder) - Primary               Reason for visit is No chief complaint on file  Encounter provider Brooks Gonzales    Provider located at [address] [place] [id number] [phone]      Recent Visits  No visits were found meeting these conditions  Showing recent visits within past 7 days and meeting all other requirements     Future Appointments  No visits were found meeting these conditions  Showing future appointments within next 150 days and meeting all other requirements        The patient was identified by name and date of birth  Kaitlynn Merino was informed that this is a telemedicine visit and that the visit is being conducted through [employer]  My office door was closed  No one else was in the room  She acknowledged consent and understanding of privacy and security of the video platform  The patient has agreed to participate and understands they can discontinue the visit at any time  Patient is aware this is a billable service  Subjective  aKitlynn Merino is a 25 y o  female    how she can set and achieve small goals, written log of her daily wins,scheduling both her chores and positive activity  Edu Adan reported she continues to do well  She shared her notebook that she is keeping track of her wins, she continues to identify two chores daily, and also do some painting of pictures in between for fun  She did look at the website for the spiritual shoppe but did not see online classes  They still have not heard about the mortgage       Session focused on Pam's uncle, the brother of her father, who continues to call her in order to find out information about her father's belongings and try to "persuade" her mother to giving them over  We discussed setting clear limits, letting the uncle know that she is not involved in this and does not wish to be involved  We discussed that if necessary she does not need to answer the phone  She agreed  She will practice this  We then discussed exploring two spiritual shops for potential online classes, as a way of learning and interacting without the stress and anxiety of having to meet in person  She agreed with this  She will continue to use the above listed tools  Sathish Meyer was neatly dressed, fully oriented, made consistent eye contact with her speech being of normal rate and tone  She was cooperative, her affect was in normal range,  her mood was euthymic, MS- appearance, her thought process logical and goal directed, she was able to be attentive during session and display insight  Sathish Meyer will continue biweekly, she will continue with recording her wins, setting small goals, and pairing a have to activity and with a want to activity     HPI     Past Medical History:   Diagnosis Date    [facility] disorder ([facility] )     Chronic kidney disease     Depression     Fatty liver     Hyperlipidemia 1/13/2020    Self-injurious behavior     Suicide attempt ([facility] )     2016       Past Surgical History:   Procedure Laterality Date    NO PAST SURGERIES         Current Outpatient Medications   Medication Sig Dispense Refill    ALPRAZolam (XANAX) 0 25 mg tablet Take 1 tablet (0 25 mg total) by mouth daily as needed for anxiety 30 tablet 2    atenolol (TENORMIN) 25 mg tablet Take 1 tablet (25 mg total) by mouth daily 30 tablet 2    atorvastatin (LIPITOR) 40 mg tablet Take 1 tablet (40 mg total) by mouth daily at bedtime 30 tablet 5    PARoxetine (PAXIL) 30 mg tablet Take 1 tablet (30 mg total) by mouth daily 30 tablet 2    QUEtiapine (SEROquel) 100 mg tablet Take 1/2 tab po qAM and 1 full tab po qhs 45 tablet 2     No current facility-administered medications for this visit  No Known Allergies    Review of Systems    Video Exam    There were no vitals filed for this visit  Physical Exam     I spent 45 minutes directly with the patient during this visit      VIRTUAL VISIT DISCLAIMER    Yessy Burgos acknowledges that she has consented to an online visit or consultation  She understands that the online visit is based solely on information provided by her, and that, in the absence of a face-to-face physical evaluation by the physician, the diagnosis she receives is both limited and provisional in terms of accuracy and completeness  This is not intended to replace a full medical face-to-face evaluation by the physician  Yessy Burgos understands and accepts these terms

## 2020-08-17 ENCOUNTER — APPOINTMENT (OUTPATIENT)
Dept: LAB | Facility: MEDICAL CENTER | Age: 25
End: 2020-08-17
Payer: COMMERCIAL

## 2020-08-17 DIAGNOSIS — N92.6 IRREGULAR BLEEDING: ICD-10-CM

## 2020-08-17 DIAGNOSIS — D75.839 THROMBOCYTOSIS: ICD-10-CM

## 2020-08-17 DIAGNOSIS — R74.8 ELEVATED ALKALINE PHOSPHATASE LEVEL: ICD-10-CM

## 2020-08-17 DIAGNOSIS — E78.2 MIXED HYPERLIPIDEMIA: ICD-10-CM

## 2020-08-17 DIAGNOSIS — E03.8 SUBCLINICAL HYPOTHYROIDISM: ICD-10-CM

## 2020-08-17 DIAGNOSIS — D72.828 OTHER ELEVATED WHITE BLOOD CELL (WBC) COUNT: ICD-10-CM

## 2020-08-17 LAB
ALBUMIN SERPL BCP-MCNC: 3.4 G/DL (ref 3.5–5)
ALP SERPL-CCNC: 135 U/L (ref 46–116)
ALT SERPL W P-5'-P-CCNC: 53 U/L (ref 12–78)
ANION GAP SERPL CALCULATED.3IONS-SCNC: 7 MMOL/L (ref 4–13)
AST SERPL W P-5'-P-CCNC: 29 U/L (ref 5–45)
BASOPHILS # BLD AUTO: 0.05 THOUSANDS/ΜL (ref 0–0.1)
BASOPHILS NFR BLD AUTO: 0 % (ref 0–1)
BILIRUB SERPL-MCNC: 0.35 MG/DL (ref 0.2–1)
BUN SERPL-MCNC: 11 MG/DL (ref 5–25)
CALCIUM SERPL-MCNC: 9.1 MG/DL (ref 8.3–10.1)
CHLORIDE SERPL-SCNC: 103 MMOL/L (ref 100–108)
CHOLEST SERPL-MCNC: 174 MG/DL (ref 50–200)
CO2 SERPL-SCNC: 27 MMOL/L (ref 21–32)
CREAT SERPL-MCNC: 0.65 MG/DL (ref 0.6–1.3)
EOSINOPHIL # BLD AUTO: 0.17 THOUSAND/ΜL (ref 0–0.61)
EOSINOPHIL NFR BLD AUTO: 1 % (ref 0–6)
ERYTHROCYTE [DISTWIDTH] IN BLOOD BY AUTOMATED COUNT: 13.7 % (ref 11.6–15.1)
GFR SERPL CREATININE-BSD FRML MDRD: 125 ML/MIN/1.73SQ M
GLUCOSE P FAST SERPL-MCNC: 77 MG/DL (ref 65–99)
HCT VFR BLD AUTO: 43.2 % (ref 34.8–46.1)
HDLC SERPL-MCNC: 36 MG/DL
HGB BLD-MCNC: 13.1 G/DL (ref 11.5–15.4)
IMM GRANULOCYTES # BLD AUTO: 0.05 THOUSAND/UL (ref 0–0.2)
IMM GRANULOCYTES NFR BLD AUTO: 0 % (ref 0–2)
LDLC SERPL CALC-MCNC: 59 MG/DL (ref 0–100)
LYMPHOCYTES # BLD AUTO: 3.38 THOUSANDS/ΜL (ref 0.6–4.47)
LYMPHOCYTES NFR BLD AUTO: 25 % (ref 14–44)
MCH RBC QN AUTO: 27.2 PG (ref 26.8–34.3)
MCHC RBC AUTO-ENTMCNC: 30.3 G/DL (ref 31.4–37.4)
MCV RBC AUTO: 90 FL (ref 82–98)
MONOCYTES # BLD AUTO: 0.71 THOUSAND/ΜL (ref 0.17–1.22)
MONOCYTES NFR BLD AUTO: 5 % (ref 4–12)
NEUTROPHILS # BLD AUTO: 8.93 THOUSANDS/ΜL (ref 1.85–7.62)
NEUTS SEG NFR BLD AUTO: 69 % (ref 43–75)
NRBC BLD AUTO-RTO: 0 /100 WBCS
PLATELET # BLD AUTO: 471 THOUSANDS/UL (ref 149–390)
PMV BLD AUTO: 10.5 FL (ref 8.9–12.7)
POTASSIUM SERPL-SCNC: 4.1 MMOL/L (ref 3.5–5.3)
PROT SERPL-MCNC: 7.4 G/DL (ref 6.4–8.2)
RBC # BLD AUTO: 4.81 MILLION/UL (ref 3.81–5.12)
SODIUM SERPL-SCNC: 137 MMOL/L (ref 136–145)
T3FREE SERPL-MCNC: 2.53 PG/ML (ref 2.3–4.2)
T4 FREE SERPL-MCNC: 0.81 NG/DL (ref 0.76–1.46)
TRIGL SERPL-MCNC: 396 MG/DL
TSH SERPL DL<=0.05 MIU/L-ACNC: 1.86 UIU/ML (ref 0.36–3.74)
WBC # BLD AUTO: 13.29 THOUSAND/UL (ref 4.31–10.16)

## 2020-08-17 PROCEDURE — 86800 THYROGLOBULIN ANTIBODY: CPT

## 2020-08-17 PROCEDURE — 85025 COMPLETE CBC W/AUTO DIFF WBC: CPT

## 2020-08-17 PROCEDURE — 80053 COMPREHEN METABOLIC PANEL: CPT

## 2020-08-17 PROCEDURE — 84481 FREE ASSAY (FT-3): CPT

## 2020-08-17 PROCEDURE — 86376 MICROSOMAL ANTIBODY EACH: CPT

## 2020-08-17 PROCEDURE — 84443 ASSAY THYROID STIM HORMONE: CPT

## 2020-08-17 PROCEDURE — 84439 ASSAY OF FREE THYROXINE: CPT

## 2020-08-17 PROCEDURE — 80061 LIPID PANEL: CPT

## 2020-08-17 PROCEDURE — 36415 COLL VENOUS BLD VENIPUNCTURE: CPT

## 2020-08-18 ENCOUNTER — TELEPHONE (OUTPATIENT)
Dept: FAMILY MEDICINE CLINIC | Facility: CLINIC | Age: 25
End: 2020-08-18

## 2020-08-18 ENCOUNTER — TELEMEDICINE (OUTPATIENT)
Dept: BEHAVIORAL/MENTAL HEALTH CLINIC | Facility: CLINIC | Age: 25
End: 2020-08-18
Payer: COMMERCIAL

## 2020-08-18 DIAGNOSIS — R74.8 ELEVATED ALKALINE PHOSPHATASE LEVEL: ICD-10-CM

## 2020-08-18 DIAGNOSIS — F41.1 GAD (GENERALIZED ANXIETY DISORDER): Primary | ICD-10-CM

## 2020-08-18 DIAGNOSIS — R74.8 ELEVATED SERUM GGT LEVEL: ICD-10-CM

## 2020-08-18 DIAGNOSIS — F31.30 BIPOLAR AFFECTIVE DISORDER, CURRENT EPISODE DEPRESSED, CURRENT EPISODE SEVERITY UNSPECIFIED (HCC): ICD-10-CM

## 2020-08-18 DIAGNOSIS — D75.839 THROMBOCYTOSIS: ICD-10-CM

## 2020-08-18 DIAGNOSIS — D72.828 OTHER ELEVATED WHITE BLOOD CELL (WBC) COUNT: Primary | ICD-10-CM

## 2020-08-18 PROBLEM — E03.8 SUBCLINICAL HYPOTHYROIDISM: Status: RESOLVED | Noted: 2020-05-01 | Resolved: 2020-08-18

## 2020-08-18 LAB
THYROGLOB AB SERPL-ACNC: <1 IU/ML (ref 0–0.9)
THYROPEROXIDASE AB SERPL-ACNC: <9 IU/ML (ref 0–34)

## 2020-08-18 PROCEDURE — 90832 PSYTX W PT 30 MINUTES: CPT | Performed by: SOCIAL WORKER

## 2020-08-18 NOTE — TELEPHONE ENCOUNTER
----- Message from Jackson Mims sent at 8/18/2020  3:45 PM EDT -----  Regarding: FW: Test Results Question  Contact: [phone]    ----- Message -----  From: Fausto Villarreal  Sent: 8/18/2020   3:34 PM EDT  To: Kristyn Bray [facility]  Subject: Test Results Question                            Hi Dr Andie Farr can you refer me to a hematologist and a GI doctor?  Thank you

## 2020-08-18 NOTE — TELEPHONE ENCOUNTER
Please call Mamie Mccauley and let her know that her labs were normal, also  1  Thyroid function is back to normal, has no evidence of autoimmune thyroid disease  2  Cholesterol improved with atorvastatin 40mg, though triglycerides still improve, continue current dose for now  3  Her alk phos level is still elevated, we had discussed it at her last visit and she was supposed to f/u with GI, I don't see any records here, so remind her to f/u if she hasnt already  4  Her WBC count and platelet count is still high, stable from last time, it may due to her current meds, but given it is still elevated, recommend she see Hematology  If she has any questions, have her schedule a virtual visit to discuss results non-urgently    Thank you! Appointment on 08/17/2020   Component Date Value Ref Range Status    T3, Free 08/17/2020 2 53  2 30 - 4 20 pg/mL Final    Free T4 08/17/2020 0 81  0 76 - 1 46 ng/dL Final    Specimen collection should occur prior to Sulfasalazine administration due to the potential for falsely elevated results   TSH [address] [place] [address] 08/17/2020 1 860  0 358 - 3 740 uIU/mL Final    The recommended reference ranges for TSH during pregnancy are as follows:   First trimester 0 1 to 2 5 uIU/mL   Second trimester  0 2 to 3 0 uIU/mL   Third trimester 0 3 to 3 0 uIU/m    Note: Normal ranges may not apply to patients who are transgender, non-binary, or whose legal sex, sex at birth, and gender identity differ  Using supplements with high doses of biotin 20 to more than 300 times greater than the adequate daily intake for adults of 30 mcg/day as established by the [employer], can cause falsely depress results      Cholesterol 08/17/2020 174  50 - 200 mg/dL Final    Cholesterol:       Desirable         <200 mg/dl       Borderline         200-239 mg/dl       High              >239           Triglycerides 08/17/2020 396* <=150 mg/dL Final    Triglyceride:     Normal          <150 mg/dl Borderline High 150-199 mg/dl     High            200-499 mg/dl        Very High       >499 mg/dl    Specimen collection should occur prior to N-Acetylcysteine or Metamizole administration due to the potential for falsely depressed results   HDL, Direct 08/17/2020 36* >=40 mg/dL Final    HDL Cholesterol:       Low     <41 mg/dL  Specimen collection should occur prior to Metamizole administration due to the potential for falsley depressed results   LDL Calculated 08/17/2020 59  0 - 100 mg/dL Final    LDL Cholesterol:     Optimal           <100 mg/dl     Near Optimal      100-129 mg/dl     Above Optimal       Borderline High 130-159 mg/dl       High            160-189 mg/dl       Very High       >189 mg/dl         This screening LDL is a calculated result  It does not have the accuracy of the Direct Measured LDL in the monitoring of patients with hyperlipidemia and/or statin therapy  Direct Measure LDL (ESI224) must be ordered separately in these patients   Sodium 08/17/2020 137  136 - 145 mmol/L Final    Potassium 08/17/2020 4 1  3 5 - 5 3 mmol/L Final    Chloride 08/17/2020 103  100 - 108 mmol/L Final    CO2 08/17/2020 27  21 - 32 mmol/L Final    ANION GAP 08/17/2020 7  4 - 13 mmol/L Final    BUN 08/17/2020 11  5 - 25 mg/dL Final    Creatinine 08/17/2020 0 65  0 60 - 1 30 mg/dL Final    Standardized to IDMS reference method    Glucose, Fasting 08/17/2020 77  65 - 99 mg/dL Final    Specimen collection should occur prior to Sulfasalazine administration due to the potential for falsely depressed results  Specimen collection should occur prior to Sulfapyridine administration due to the potential for falsely elevated results   Calcium 08/17/2020 9 1  8 3 - 10 1 mg/dL Final    AST 08/17/2020 29  5 - 45 U/L Final    Specimen collection should occur prior to Sulfasalazine administration due to the potential for falsely depressed results       ALT 08/17/2020 53  12 - 78 U/L Final    Specimen collection should occur prior to Sulfasalazine and/or Sulfapyridine administration due to the potential for falsely depressed results   Alkaline Phosphatase 08/17/2020 135* 46 - 116 U/L Final    Total Protein 08/17/2020 7 4  6 4 - 8 2 g/dL Final    Albumin 08/17/2020 3 4* 3 5 - 5 0 g/dL Final    Total Bilirubin 08/17/2020 0 35  0 20 - 1 00 mg/dL Final    Use of this assay is not recommended for patients undergoing treatment with eltrombopag due to the potential for falsely elevated results      eGFR 08/17/2020 125  ml/min/1 73sq m Final    WBC 08/17/2020 13 29* 4 31 - 10 16 Thousand/uL Final    RBC 08/17/2020 4 81  3 81 - 5 12 Million/uL Final    Hemoglobin 08/17/2020 13 1  11 5 - 15 4 g/dL Final    Hematocrit 08/17/2020 43 2  34 8 - 46 1 % Final    MCV 08/17/2020 90  82 - 98 fL Final    MCH 08/17/2020 27 2  26 8 - 34 3 pg Final    MCHC 08/17/2020 30 3* 31 4 - 37 4 g/dL Final    RDW 08/17/2020 13 7  11 6 - 15 1 % Final    MPV 08/17/2020 10 5  8 9 - 12 7 fL Final    Platelets 13/97/7961 471* 149 - 390 Thousands/uL Final    nRBC 08/17/2020 0  /100 WBCs Final    Neutrophils Relative 08/17/2020 69  43 - 75 % Final    Immat GRANS % 08/17/2020 0  0 - 2 % Final    Lymphocytes Relative 08/17/2020 25  14 - 44 % Final    Monocytes Relative 08/17/2020 5  4 - 12 % Final    Eosinophils Relative 08/17/2020 1  0 - 6 % Final    Basophils Relative 08/17/2020 0  0 - 1 % Final    Neutrophils Absolute 08/17/2020 8 93* 1 85 - 7 62 Thousands/µL Final    Immature Grans Absolute 08/17/2020 0 05  0 00 - 0 20 Thousand/uL Final    Lymphocytes Absolute 08/17/2020 3 38  0 60 - 4 47 Thousands/µL Final    Monocytes Absolute 08/17/2020 0 71  0 17 - 1 22 Thousand/µL Final    Eosinophils Absolute 08/17/2020 0 17  0 00 - 0 61 Thousand/µL Final    Basophils Absolute 08/17/2020 0 05  0 00 - 0 10 Thousands/µL Final    THYROID MICROSOMAL ANTIBODY 08/17/2020 <9  0 - 34 IU/mL Final    Thyroglobulin Ab 08/17/2020 <1 0  0 0 - 0 9 IU/mL Final    Thyroglobulin Antibody measured by [facility]

## 2020-08-18 NOTE — BH TREATMENT PLAN
[address] [facility]  1995       Date of Initial Treatment Plan: 04/13/2020   Date of Current Treatment Plan: 08/18/20    Treatment Plan Number 2     Strengths/Personal Resources for Self Care: strong support from , good listener, be supportive to , strong supporter of her brother  Diagnosis:   1  SUSANNA (generalized anxiety disorder)     2  Bipolar affective disorder, current episode depressed, current episode severity unspecified ([facility] [address] )         Area of Needs: Motivation to keep maintaining progress        Long Term Goal 1: Keep maintaining progress      Target Date: 01/18/2021  Completion Date: TBD         Short Term Objectives for Goal 1: See Objectives Below  1) Ritu Grant will continue to use sessions to Identify, verbalize and process emotions as to maintain be less overwhelmed by her emotions  2) Ritu Grant will maintain using the skills learned in session to ensure following trough with daily goals, self care and taking care of the house  3) Ritu Grant will explore explore ways to safely increase social interactions  4) Ritu Grant will maintain her medication and continue to follow through with appointments  GOAL 1: Modality: Individual 2x per month   Completion Date TBD and The person(s) responsible for carrying out the plan is  [employer], LCSW She will be seen 1x per month  Modalities: CBT, behavior modification, insight oriented therapy, psychoeducation,  mindfulness, problem solving skills, communication skills, stress management skills, assertiveness skills, DBT skills, guided imgery, emotional needs meeting  Behavioral Health Treatment Plan [facility]: Diagnosis and Treatment Plan explained to Herlene Crimes relates understanding diagnosis and is agreeable to Treatment Plan  Client Comments : Please share your thoughts, feelings, need and/or experiences regarding your treatment plan: Ritu Grant was able to identify progress

## 2020-08-18 NOTE — PSYCH
Virtual Regular Visit      Assessment/Plan:    Problem List Items Addressed This Visit        Other    Bipolar affective disorder, current episode depressed ([address] )    SUSANNA (generalized anxiety disorder) - Primary               Reason for visit is No chief complaint on file  Encounter provider Adelita Tang    Provider located at [address] [place] [id number] [phone]      Recent Visits  No visits were found meeting these conditions  Showing recent visits within past 7 days and meeting all other requirements     Future Appointments  No visits were found meeting these conditions  Showing future appointments within next 150 days and meeting all other requirements        The patient was identified by name and date of birth  Kelley Hansen was informed that this is a telemedicine visit and that the visit is being conducted through [employer]  My office door was closed  No one else was in the room  She acknowledged consent and understanding of privacy and security of the video platform  The patient has agreed to participate and understands they can discontinue the visit at any time  Patient is aware this is a billable service  Subjective  Kelley Hansen is a 25 y o  female   Donah Lasso reported she is doing well overall  She continues to use her skills, she remains on task with keeping the house up, her self care, as well as exploring ways she can increase socialization safely  She reported her sleep is good, her mood has remained stable  Her 's work schedule has returned to normal, and she explored a new spiritual shop over the weekend and asked if they needed help  However they did not   She reported that they have to complete another credit check in order to move forward with purchasing her mother's home, she is hopeful this will go through and they will be able to move forward  Overall she reports feelings she is doing well  That being said she did ask about Disability as her  was interested in knowing  This therapist gave her the information that she had however being that she has almost no work history and is young it could be difficult to obtain  She was encouraged to seek information from a  that specializes in this  Session focused on updating treatment plan  She was able to recognize that at this time, she feels she needs to maintain her motivation to maintain her progress  As we identified new objectives we discussed her continued need for services  Cricket Alatorre felt she could move to monthly as to maintain support but continue to move forward  She was informed that if her need changed to call office to increase appointments  She will maintain her medication appointments and continue to look for classes she can take online to be more involved socially  She reported she and her mother continue to get along well and she feels she is moving through the grief of losing her father  Overall, again she felt she is doing well  Cricket Alatorre was neatly dressed, fully oriented, made consistent eye contact with her speech being of normal rate and tone  Her thought process appeared logical and she presented with fair insight she was attentive throughout session  Her affect appeared within normal range and her mood euthymic  Cricket Alatorre will move to monthly appointments to maintain progress       HPI     Past Medical History:   Diagnosis Date    B[facility] disorder ([facility] )     Chronic kidney disease     Depression     Fatty liver     Hyperlipidemia 1/13/2020    Self-injurious behavior     [facility]     2016       Past Surgical History:   Procedure Laterality Date    NO PAST SURGERIES         Current Outpatient Medications   Medication Sig Dispense Refill    ALPRAZolam (XANAX) 0 25 mg tablet Take 1 tablet (0 25 mg total) by mouth daily as needed for anxiety 30 tablet 2    atenolol (TENORMIN) 25 mg tablet Take 1 tablet (25 mg total) by mouth daily 30 tablet 2    atorvastatin (LIPITOR) 40 mg tablet Take 1 tablet (40 mg total) by mouth daily at bedtime 30 tablet 5    PARoxetine (PAXIL) 30 mg tablet Take 1 tablet (30 mg total) by mouth daily 30 tablet 2    QUEtiapine (SEROquel) 100 mg tablet Take 1/2 tab po qAM and 1 full tab po qhs 45 tablet 2     No current facility-administered medications for this visit  No Known Allergies    Review of Systems    Video Exam    There were no vitals filed for this visit  Physical Exam     I spent 30 minutes directly with the patient during this visit      VIRTUAL VISIT DISCLAIMER    Kelley Hansen acknowledges that she has consented to an online visit or consultation  She understands that the online visit is based solely on information provided by her, and that, in the absence of a face-to-face physical evaluation by the physician, the diagnosis she receives is both limited and provisional in terms of accuracy and completeness  This is not intended to replace a full medical face-to-face evaluation by the physician  Kelley Hansen understands and accepts these terms

## 2020-08-19 ENCOUNTER — TELEPHONE (OUTPATIENT)
Dept: SURGICAL ONCOLOGY | Facility: CLINIC | Age: 25
End: 2020-08-19

## 2020-08-19 NOTE — TELEPHONE ENCOUNTER
New Patient Encounter    New Patient Intake Form   Patient Details:  Sebastian Polo  1995  [id number]    Background Information:  [address] starts by opening a telephone encounter and gathering the following information   Who is calling to schedule? If not self, relationship to patient? self   Referring Provider Regina Izaguirre   What is the diagnosis? Other elevated white blood cell (WBC) count   Elevated alkaline phosphatase level   Thrombocytosis    Is this diagnosis confirmed? Yes   When was the diagnosis? 8/19   Is there a confirmed diagnosis from a biopsy/tissue reviewed by pathology? no   Is patient aware of diagnosis? Yes   Is there a personal history and what kind? na   Is there a family history and what kind? na   Reason for visit? New Diagnosis   Have you had any imaging or labs done? If so: when, where? yes  [facility]   Are records in [employer]? yes   Was the patient told to bring a disk? no   Does the patient smoke or Vape? no   If yes, how many packs or cartridges per day? na   Scheduling Information:   Preferred [place]:  [facility]     Are there any dates/time the patient cannot be seen? na   Miscellaneous: na   After completing the above information, please route to Financial Counselor and the appropriate Nurse Navigator for review

## 2020-08-30 DIAGNOSIS — F43.10 PTSD (POST-TRAUMATIC STRESS DISORDER): ICD-10-CM

## 2020-08-30 DIAGNOSIS — F41.1 GAD (GENERALIZED ANXIETY DISORDER): ICD-10-CM

## 2020-08-30 DIAGNOSIS — F31.30 BIPOLAR AFFECTIVE DISORDER, CURRENT EPISODE DEPRESSED, CURRENT EPISODE SEVERITY UNSPECIFIED (HCC): ICD-10-CM

## 2020-08-30 DIAGNOSIS — F42.2 MIXED OBSESSIONAL THOUGHTS AND ACTS: ICD-10-CM

## 2020-08-31 RX ORDER — QUETIAPINE FUMARATE 100 MG/1
TABLET, FILM COATED ORAL
Qty: 45 TABLET | Refills: 2 | OUTPATIENT
Start: 2020-08-31

## 2020-08-31 RX ORDER — PAROXETINE HYDROCHLORIDE 20 MG/1
TABLET, FILM COATED ORAL
Qty: 30 TABLET | Refills: 1 | OUTPATIENT
Start: 2020-08-31

## 2020-09-03 DIAGNOSIS — E78.2 MIXED HYPERLIPIDEMIA: ICD-10-CM

## 2020-09-03 RX ORDER — ATORVASTATIN CALCIUM 40 MG/1
TABLET, FILM COATED ORAL
Qty: 30 TABLET | Refills: 5 | Status: SHIPPED | OUTPATIENT
Start: 2020-09-03 | End: 2021-02-11 | Stop reason: SDUPTHER

## 2020-09-04 DIAGNOSIS — F42.2 MIXED OBSESSIONAL THOUGHTS AND ACTS: ICD-10-CM

## 2020-09-04 DIAGNOSIS — F43.10 PTSD (POST-TRAUMATIC STRESS DISORDER): ICD-10-CM

## 2020-09-04 DIAGNOSIS — F41.1 GAD (GENERALIZED ANXIETY DISORDER): ICD-10-CM

## 2020-09-04 RX ORDER — PAROXETINE 30 MG/1
TABLET, FILM COATED ORAL
Qty: 30 TABLET | Refills: 2 | OUTPATIENT
Start: 2020-09-04

## 2020-09-16 DIAGNOSIS — F41.1 GAD (GENERALIZED ANXIETY DISORDER): ICD-10-CM

## 2020-09-16 RX ORDER — ALPRAZOLAM 0.25 MG/1
TABLET ORAL
Qty: 30 TABLET | OUTPATIENT
Start: 2020-09-16

## 2020-09-23 ENCOUNTER — TELEMEDICINE (OUTPATIENT)
Dept: BEHAVIORAL/MENTAL HEALTH CLINIC | Facility: CLINIC | Age: 25
End: 2020-09-23
Payer: COMMERCIAL

## 2020-09-23 DIAGNOSIS — F31.30 BIPOLAR AFFECTIVE DISORDER, CURRENT EPISODE DEPRESSED, CURRENT EPISODE SEVERITY UNSPECIFIED (HCC): Primary | ICD-10-CM

## 2020-09-23 DIAGNOSIS — F41.1 GAD (GENERALIZED ANXIETY DISORDER): ICD-10-CM

## 2020-09-23 PROCEDURE — 90834 PSYTX W PT 45 MINUTES: CPT | Performed by: SOCIAL WORKER

## 2020-09-23 NOTE — PSYCH
Virtual Regular Visit      Assessment/Plan:    Problem List Items Addressed This Visit        Other    Bipolar affective disorder, current episode depressed ([address] ) - Primary    SUSANNA (generalized anxiety disorder)               Reason for visit is No chief complaint on file  Encounter provider Rukhsana Rodriguez    Provider located at [address] [place] [id number] [phone]      Recent Visits  No visits were found meeting these conditions  Showing recent visits within past 7 days and meeting all other requirements     Future Appointments  No visits were found meeting these conditions  Showing future appointments within next 150 days and meeting all other requirements        The patient was identified by name and date of birth  Emmanuelle Owusu was informed that this is a telemedicine visit and that the visit is being conducted through [employer]  My office door was closed  No one else was in the room  She acknowledged consent and understanding of privacy and security of the video platform  The patient has agreed to participate and understands they can discontinue the visit at any time  Patient is aware this is a billable service  Subjective  Emmanuelle Owusu is a 25 y o  female   Genoveva Souza reported she had to cancel her trip to [address] as [place] is on the [employer]  They have just returned from the beach in [place] last week  She reports her mood is stable, her sleep is good, however she is feeling somewhat tired  She reported slacking on keeping up with he daily wins and two items on her to do list everyday  Genoveva Souza further reported her mother in [address] [facility] dog had to be put down  Genoveva Souza stated regarding her mother's house, she no longer wants to sell it to Genoveva Souza, which was disappointing however she and her  plan to buy something else in the coming year     Session focused on processing the loss of her 's mother's dog  She expressed being sad that she did not have a chance to say good bye, and also that her mother in law immediately threw away all the dogs items following his passing, nor did she get his ashes  She expressed her  was also upset by this  She reported her dog came along with them on vacation, which assists her in engaging with others as people come up and want to pet the dog  She also expressed a desire to begin completing art work again  She stated she is thinking of art she can make and sell, giving her a small income  Overall she reported feeling she is doing well  We discussed returning to keeping track of her daily wins, having two to three housekeeping items she wants to complete each day, along with adding an art goal  She expressed liking this idea  She seemed quieter in session, when questioned about this she reported she was just somewhat tired  She will follow up with her medication provider around the weight gain, and follow up with the hematologist   She will remain monthly to maintain progress        HPI     Past Medical History:   Diagnosis Date    Bipolar disorder ([place] [address] )     Chronic kidney disease     Depression     Fatty liver     Hyperlipidemia 1/13/2020    Self-injurious behavior     Suicide attempt ([place] [address] )     2016       Past Surgical History:   Procedure Laterality Date    NO PAST SURGERIES         Current Outpatient Medications   Medication Sig Dispense Refill    ALPRAZolam (XANAX) 0 25 mg tablet Take 1 tablet (0 25 mg total) by mouth daily as needed for anxiety 30 tablet 2    atenolol (TENORMIN) 25 mg tablet Take 1 tablet (25 mg total) by mouth daily 30 tablet 2    atorvastatin (LIPITOR) 40 mg tablet TAKE ONE TABLET BY MOUTH DAILY AT BEDTIME 30 tablet 5    PARoxetine (PAXIL) 30 mg tablet Take 1 tablet (30 mg total) by mouth daily 30 tablet 2    QUEtiapine (SEROquel) 100 mg tablet Take 1/2 tab po qAM and 1 full tab po qhs 45 tablet 2     No current facility-administered medications for this visit  No Known Allergies    Review of Systems    Video Exam    There were no vitals filed for this visit  Physical Exam     I spent 40 minutes directly with the patient during this visit      VIRTUAL VISIT DISCLAIMER    Abhinav Perez acknowledges that she has consented to an online visit or consultation  She understands that the online visit is based solely on information provided by her, and that, in the absence of a face-to-face physical evaluation by the physician, the diagnosis she receives is both limited and provisional in terms of accuracy and completeness  This is not intended to replace a full medical face-to-face evaluation by the physician  Davidtony Chris understands and accepts these terms

## 2020-10-15 ENCOUNTER — TELEMEDICINE (OUTPATIENT)
Dept: PSYCHIATRY | Facility: CLINIC | Age: 25
End: 2020-10-15
Payer: COMMERCIAL

## 2020-10-15 DIAGNOSIS — F43.10 PTSD (POST-TRAUMATIC STRESS DISORDER): ICD-10-CM

## 2020-10-15 DIAGNOSIS — F41.1 GAD (GENERALIZED ANXIETY DISORDER): ICD-10-CM

## 2020-10-15 DIAGNOSIS — Z63.4 BEREAVEMENT: ICD-10-CM

## 2020-10-15 DIAGNOSIS — F31.30 BIPOLAR AFFECTIVE DISORDER, CURRENT EPISODE DEPRESSED, CURRENT EPISODE SEVERITY UNSPECIFIED (HCC): Primary | ICD-10-CM

## 2020-10-15 DIAGNOSIS — F42.2 MIXED OBSESSIONAL THOUGHTS AND ACTS: ICD-10-CM

## 2020-10-15 PROCEDURE — 99214 OFFICE O/P EST MOD 30 MIN: CPT | Performed by: PHYSICIAN ASSISTANT

## 2020-10-15 RX ORDER — QUETIAPINE FUMARATE 100 MG/1
TABLET, FILM COATED ORAL
Qty: 60 TABLET | Refills: 2 | Status: SHIPPED | OUTPATIENT
Start: 2020-10-15 | End: 2020-12-28 | Stop reason: SDUPTHER

## 2020-10-15 RX ORDER — PAROXETINE 30 MG/1
30 TABLET, FILM COATED ORAL DAILY
Qty: 30 TABLET | Refills: 2 | Status: SHIPPED | OUTPATIENT
Start: 2020-10-15 | End: 2020-12-28 | Stop reason: SDUPTHER

## 2020-10-15 RX ORDER — ALPRAZOLAM 0.25 MG/1
0.25 TABLET ORAL DAILY PRN
Qty: 30 TABLET | Refills: 2 | Status: SHIPPED | OUTPATIENT
Start: 2020-10-15 | End: 2020-12-28 | Stop reason: SDUPTHER

## 2020-10-15 SDOH — SOCIAL STABILITY - SOCIAL INSECURITY: DISSAPEARANCE AND DEATH OF FAMILY MEMBER: Z63.4

## 2020-10-19 ENCOUNTER — CONSULT (OUTPATIENT)
Dept: HEMATOLOGY ONCOLOGY | Facility: CLINIC | Age: 25
End: 2020-10-19
Payer: COMMERCIAL

## 2020-10-19 VITALS
HEART RATE: 101 BPM | WEIGHT: 282 LBS | HEIGHT: 63 IN | OXYGEN SATURATION: 97 % | DIASTOLIC BLOOD PRESSURE: 74 MMHG | BODY MASS INDEX: 49.96 KG/M2 | SYSTOLIC BLOOD PRESSURE: 124 MMHG | TEMPERATURE: 98.4 F | RESPIRATION RATE: 18 BRPM

## 2020-10-19 DIAGNOSIS — R74.8 ELEVATED ALKALINE PHOSPHATASE LEVEL: ICD-10-CM

## 2020-10-19 DIAGNOSIS — D72.828 OTHER ELEVATED WHITE BLOOD CELL (WBC) COUNT: ICD-10-CM

## 2020-10-19 DIAGNOSIS — D75.839 THROMBOCYTOSIS: ICD-10-CM

## 2020-10-19 PROCEDURE — 99245 OFF/OP CONSLTJ NEW/EST HI 55: CPT | Performed by: INTERNAL MEDICINE

## 2020-10-19 PROCEDURE — 1036F TOBACCO NON-USER: CPT | Performed by: INTERNAL MEDICINE

## 2020-10-26 ENCOUNTER — PATIENT OUTREACH (OUTPATIENT)
Dept: FAMILY MEDICINE CLINIC | Facility: CLINIC | Age: 25
End: 2020-10-26

## 2020-10-26 ENCOUNTER — TELEMEDICINE (OUTPATIENT)
Dept: BEHAVIORAL/MENTAL HEALTH CLINIC | Facility: CLINIC | Age: 25
End: 2020-10-26
Payer: COMMERCIAL

## 2020-10-26 DIAGNOSIS — F43.10 PTSD (POST-TRAUMATIC STRESS DISORDER): ICD-10-CM

## 2020-10-26 DIAGNOSIS — Z78.9 NEED FOR FOLLOW-UP BY SOCIAL WORKER: Primary | ICD-10-CM

## 2020-10-26 DIAGNOSIS — F31.30 BIPOLAR AFFECTIVE DISORDER, CURRENT EPISODE DEPRESSED, CURRENT EPISODE SEVERITY UNSPECIFIED (HCC): Primary | ICD-10-CM

## 2020-10-26 PROCEDURE — 90834 PSYTX W PT 45 MINUTES: CPT | Performed by: SOCIAL WORKER

## 2020-11-09 ENCOUNTER — LAB (OUTPATIENT)
Dept: LAB | Facility: MEDICAL CENTER | Age: 25
End: 2020-11-09
Payer: COMMERCIAL

## 2020-11-09 DIAGNOSIS — D75.839 THROMBOCYTOSIS: ICD-10-CM

## 2020-11-09 DIAGNOSIS — D72.828 OTHER ELEVATED WHITE BLOOD CELL (WBC) COUNT: ICD-10-CM

## 2020-11-09 LAB — CRP SERPL QL: 14.1 MG/L

## 2020-11-09 PROCEDURE — 81270 JAK2 GENE: CPT

## 2020-11-09 PROCEDURE — 86430 RHEUMATOID FACTOR TEST QUAL: CPT

## 2020-11-09 PROCEDURE — 86038 ANTINUCLEAR ANTIBODIES: CPT

## 2020-11-09 PROCEDURE — 81219 CALR GENE COM VARIANTS: CPT

## 2020-11-09 PROCEDURE — 81402 MOPATH PROCEDURE LEVEL 3: CPT

## 2020-11-09 PROCEDURE — 86140 C-REACTIVE PROTEIN: CPT

## 2020-11-09 PROCEDURE — 36415 COLL VENOUS BLD VENIPUNCTURE: CPT

## 2020-11-10 ENCOUNTER — LAB (OUTPATIENT)
Dept: LAB | Facility: HOSPITAL | Age: 25
End: 2020-11-10
Attending: INTERNAL MEDICINE
Payer: COMMERCIAL

## 2020-11-10 ENCOUNTER — TRANSCRIBE ORDERS (OUTPATIENT)
Dept: ADMINISTRATIVE | Facility: HOSPITAL | Age: 25
End: 2020-11-10

## 2020-11-10 DIAGNOSIS — D47.3 THROMBOCYTHEMIA, ESSENTIAL (HCC): ICD-10-CM

## 2020-11-10 DIAGNOSIS — D72.828 OTHER ELEVATED WHITE BLOOD CELL (WBC) COUNT: ICD-10-CM

## 2020-11-10 DIAGNOSIS — D47.3 THROMBOCYTHEMIA, ESSENTIAL (HCC): Primary | ICD-10-CM

## 2020-11-10 LAB
ERYTHROCYTE [SEDIMENTATION RATE] IN BLOOD: 49 MM/HOUR (ref 0–19)
RHEUMATOID FACT SER QL LA: NEGATIVE

## 2020-11-10 PROCEDURE — 36415 COLL VENOUS BLD VENIPUNCTURE: CPT

## 2020-11-10 PROCEDURE — 85652 RBC SED RATE AUTOMATED: CPT

## 2020-11-11 LAB — RYE IGE QN: NEGATIVE

## 2020-11-16 ENCOUNTER — OFFICE VISIT (OUTPATIENT)
Dept: HEMATOLOGY ONCOLOGY | Facility: CLINIC | Age: 25
End: 2020-11-16
Payer: COMMERCIAL

## 2020-11-16 VITALS
OXYGEN SATURATION: 95 % | WEIGHT: 285 LBS | BODY MASS INDEX: 50.5 KG/M2 | HEIGHT: 63 IN | HEART RATE: 105 BPM | TEMPERATURE: 96.9 F | SYSTOLIC BLOOD PRESSURE: 118 MMHG | RESPIRATION RATE: 18 BRPM | DIASTOLIC BLOOD PRESSURE: 72 MMHG

## 2020-11-16 DIAGNOSIS — D72.828 OTHER ELEVATED WHITE BLOOD CELL (WBC) COUNT: Primary | ICD-10-CM

## 2020-11-16 DIAGNOSIS — D75.839 THROMBOCYTOSIS: ICD-10-CM

## 2020-11-16 PROCEDURE — 3008F BODY MASS INDEX DOCD: CPT | Performed by: INTERNAL MEDICINE

## 2020-11-16 PROCEDURE — 1036F TOBACCO NON-USER: CPT | Performed by: INTERNAL MEDICINE

## 2020-11-16 PROCEDURE — 99214 OFFICE O/P EST MOD 30 MIN: CPT | Performed by: INTERNAL MEDICINE

## 2020-11-27 ENCOUNTER — TELEPHONE (OUTPATIENT)
Dept: HEMATOLOGY ONCOLOGY | Facility: CLINIC | Age: 25
End: 2020-11-27

## 2020-11-27 DIAGNOSIS — D72.828 OTHER ELEVATED WHITE BLOOD CELL (WBC) COUNT: ICD-10-CM

## 2020-11-27 DIAGNOSIS — D75.839 THROMBOCYTOSIS: Primary | ICD-10-CM

## 2020-11-27 LAB — MISCELLANEOUS LAB TEST RESULT: NORMAL

## 2020-12-04 DIAGNOSIS — F43.10 PTSD (POST-TRAUMATIC STRESS DISORDER): ICD-10-CM

## 2020-12-04 DIAGNOSIS — F41.1 GAD (GENERALIZED ANXIETY DISORDER): ICD-10-CM

## 2020-12-04 DIAGNOSIS — F31.30 BIPOLAR AFFECTIVE DISORDER, CURRENT EPISODE DEPRESSED, CURRENT EPISODE SEVERITY UNSPECIFIED (HCC): ICD-10-CM

## 2020-12-04 DIAGNOSIS — F42.2 MIXED OBSESSIONAL THOUGHTS AND ACTS: ICD-10-CM

## 2020-12-04 RX ORDER — QUETIAPINE FUMARATE 100 MG/1
TABLET, FILM COATED ORAL
Qty: 60 TABLET | Refills: 2 | OUTPATIENT
Start: 2020-12-04

## 2020-12-04 RX ORDER — PAROXETINE 30 MG/1
TABLET, FILM COATED ORAL
Qty: 30 TABLET | Refills: 2 | OUTPATIENT
Start: 2020-12-04

## 2020-12-07 DIAGNOSIS — F41.1 GAD (GENERALIZED ANXIETY DISORDER): ICD-10-CM

## 2020-12-15 RX ORDER — ALPRAZOLAM 0.25 MG/1
TABLET ORAL
Qty: 30 TABLET | OUTPATIENT
Start: 2020-12-15

## 2020-12-22 ENCOUNTER — TELEPHONE (OUTPATIENT)
Dept: PSYCHIATRY | Facility: CLINIC | Age: 25
End: 2020-12-22

## 2020-12-22 ENCOUNTER — TELEMEDICINE (OUTPATIENT)
Dept: BEHAVIORAL/MENTAL HEALTH CLINIC | Facility: CLINIC | Age: 25
End: 2020-12-22
Payer: COMMERCIAL

## 2020-12-22 DIAGNOSIS — F41.1 GAD (GENERALIZED ANXIETY DISORDER): Primary | ICD-10-CM

## 2020-12-22 DIAGNOSIS — R29.818 EXTRAPYRAMIDAL SYMPTOM: Primary | ICD-10-CM

## 2020-12-22 DIAGNOSIS — F31.30 BIPOLAR AFFECTIVE DISORDER, CURRENT EPISODE DEPRESSED, CURRENT EPISODE SEVERITY UNSPECIFIED (HCC): ICD-10-CM

## 2020-12-22 PROCEDURE — 90832 PSYTX W PT 30 MINUTES: CPT | Performed by: SOCIAL WORKER

## 2020-12-22 RX ORDER — BENZTROPINE MESYLATE 1 MG/1
1 TABLET ORAL
Qty: 30 TABLET | Refills: 0 | Status: SHIPPED | OUTPATIENT
Start: 2020-12-22 | End: 2020-12-28 | Stop reason: SDUPTHER

## 2020-12-28 ENCOUNTER — TELEMEDICINE (OUTPATIENT)
Dept: PSYCHIATRY | Facility: CLINIC | Age: 25
End: 2020-12-28
Payer: COMMERCIAL

## 2020-12-28 DIAGNOSIS — Z63.4 BEREAVEMENT: ICD-10-CM

## 2020-12-28 DIAGNOSIS — F42.2 MIXED OBSESSIONAL THOUGHTS AND ACTS: ICD-10-CM

## 2020-12-28 DIAGNOSIS — F31.30 BIPOLAR AFFECTIVE DISORDER, CURRENT EPISODE DEPRESSED, CURRENT EPISODE SEVERITY UNSPECIFIED (HCC): Primary | ICD-10-CM

## 2020-12-28 DIAGNOSIS — F43.10 PTSD (POST-TRAUMATIC STRESS DISORDER): ICD-10-CM

## 2020-12-28 DIAGNOSIS — R29.818 EXTRAPYRAMIDAL SYMPTOM: ICD-10-CM

## 2020-12-28 DIAGNOSIS — F41.1 GAD (GENERALIZED ANXIETY DISORDER): ICD-10-CM

## 2020-12-28 PROCEDURE — 99214 OFFICE O/P EST MOD 30 MIN: CPT | Performed by: PHYSICIAN ASSISTANT

## 2020-12-28 PROCEDURE — 1036F TOBACCO NON-USER: CPT | Performed by: PHYSICIAN ASSISTANT

## 2020-12-28 RX ORDER — QUETIAPINE FUMARATE 100 MG/1
TABLET, FILM COATED ORAL
Qty: 75 TABLET | Refills: 2 | Status: SHIPPED | OUTPATIENT
Start: 2020-12-28 | End: 2021-02-25 | Stop reason: SDUPTHER

## 2020-12-28 RX ORDER — PAROXETINE 30 MG/1
30 TABLET, FILM COATED ORAL DAILY
Qty: 30 TABLET | Refills: 2 | Status: SHIPPED | OUTPATIENT
Start: 2020-12-28 | End: 2021-02-25 | Stop reason: SDUPTHER

## 2020-12-28 RX ORDER — BENZTROPINE MESYLATE 1 MG/1
1 TABLET ORAL
Qty: 30 TABLET | Refills: 2 | Status: SHIPPED | OUTPATIENT
Start: 2020-12-28 | End: 2021-02-25 | Stop reason: SDUPTHER

## 2020-12-28 RX ORDER — ALPRAZOLAM 0.25 MG/1
0.25 TABLET ORAL DAILY PRN
Qty: 30 TABLET | Refills: 2 | Status: SHIPPED | OUTPATIENT
Start: 2020-12-28 | End: 2021-02-25 | Stop reason: ALTCHOICE

## 2020-12-28 SDOH — SOCIAL STABILITY - SOCIAL INSECURITY: DISSAPEARANCE AND DEATH OF FAMILY MEMBER: Z63.4

## 2021-02-11 DIAGNOSIS — E78.2 MIXED HYPERLIPIDEMIA: ICD-10-CM

## 2021-02-11 RX ORDER — ATORVASTATIN CALCIUM 40 MG/1
40 TABLET, FILM COATED ORAL
Qty: 90 TABLET | Refills: 0 | Status: SHIPPED | OUTPATIENT
Start: 2021-02-11 | End: 2021-04-29 | Stop reason: SDUPTHER

## 2021-02-11 NOTE — TELEPHONE ENCOUNTER
Did speak with patient to confirm need for refill and to make appt since it has been over a year since her last physical

## 2021-02-16 DIAGNOSIS — F31.30 BIPOLAR AFFECTIVE DISORDER, CURRENT EPISODE DEPRESSED, CURRENT EPISODE SEVERITY UNSPECIFIED (HCC): ICD-10-CM

## 2021-02-16 RX ORDER — QUETIAPINE FUMARATE 100 MG/1
TABLET, FILM COATED ORAL
Qty: 75 TABLET | Refills: 2 | OUTPATIENT
Start: 2021-02-16

## 2021-02-22 ENCOUNTER — TELEPHONE (OUTPATIENT)
Dept: PSYCHIATRY | Facility: CLINIC | Age: 26
End: 2021-02-22

## 2021-02-22 NOTE — TELEPHONE ENCOUNTER
Patient called today inquiring about transferring care to another psychiatrist  She was supposed to have an appt today with you at 9:30 and she stated she waited the whole time and never connected with you   If you could please give her a call to discuss these things and then if she still would like we can schedule her with another psychiatrist  Thank you
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
## 2021-02-25 ENCOUNTER — TELEMEDICINE (OUTPATIENT)
Dept: PSYCHIATRY | Facility: CLINIC | Age: 26
End: 2021-02-25
Payer: COMMERCIAL

## 2021-02-25 DIAGNOSIS — F41.1 GAD (GENERALIZED ANXIETY DISORDER): ICD-10-CM

## 2021-02-25 DIAGNOSIS — F43.10 PTSD (POST-TRAUMATIC STRESS DISORDER): ICD-10-CM

## 2021-02-25 DIAGNOSIS — F31.30 BIPOLAR AFFECTIVE DISORDER, CURRENT EPISODE DEPRESSED, CURRENT EPISODE SEVERITY UNSPECIFIED (HCC): Primary | ICD-10-CM

## 2021-02-25 DIAGNOSIS — Z63.4 BEREAVEMENT: ICD-10-CM

## 2021-02-25 DIAGNOSIS — R29.818 EXTRAPYRAMIDAL SYMPTOM: ICD-10-CM

## 2021-02-25 DIAGNOSIS — F42.2 MIXED OBSESSIONAL THOUGHTS AND ACTS: ICD-10-CM

## 2021-02-25 PROCEDURE — 1036F TOBACCO NON-USER: CPT | Performed by: PHYSICIAN ASSISTANT

## 2021-02-25 PROCEDURE — 99214 OFFICE O/P EST MOD 30 MIN: CPT | Performed by: PHYSICIAN ASSISTANT

## 2021-02-25 RX ORDER — PAROXETINE HYDROCHLORIDE 40 MG/1
40 TABLET, FILM COATED ORAL DAILY
Qty: 30 TABLET | Refills: 2 | Status: SHIPPED | OUTPATIENT
Start: 2021-02-25 | End: 2021-05-10 | Stop reason: SDUPTHER

## 2021-02-25 RX ORDER — BENZTROPINE MESYLATE 0.5 MG/1
0.5 TABLET ORAL
Qty: 30 TABLET | Refills: 2 | Status: SHIPPED | OUTPATIENT
Start: 2021-02-25 | End: 2021-05-10 | Stop reason: SDUPTHER

## 2021-02-25 RX ORDER — CLONAZEPAM 0.5 MG/1
0.25 TABLET ORAL 2 TIMES DAILY PRN
Qty: 30 TABLET | Refills: 2 | Status: SHIPPED | OUTPATIENT
Start: 2021-02-25 | End: 2021-05-10 | Stop reason: SDUPTHER

## 2021-02-25 RX ORDER — QUETIAPINE FUMARATE 100 MG/1
TABLET, FILM COATED ORAL
Qty: 75 TABLET | Refills: 2 | Status: SHIPPED | OUTPATIENT
Start: 2021-02-25 | End: 2021-05-10 | Stop reason: SDUPTHER

## 2021-02-25 SDOH — SOCIAL STABILITY - SOCIAL INSECURITY: DISSAPEARANCE AND DEATH OF FAMILY MEMBER: Z63.4

## 2021-02-25 NOTE — BH TREATMENT PLAN
TREATMENT PLAN (Medication Management Only)        [facility]    Name and Date of Birth:  Pete Dias  1995  Date of Treatment Plan: February 25, 2021  Diagnosis/Diagnoses:    1  Bipolar affective disorder, current episode depressed, current episode severity unspecified ([facility] [address] )    2  SUSANNA (generalized anxiety disorder)    3  Mixed obsessional thoughts and acts    4  PTSD (post-traumatic stress disorder)    5  Bereavement    6  Extrapyramidal symptom      Strengths/Personal Resources for Self-Care: "I'm a good helper; I listen to everybody's problems; I take care of my family the best I can"  Area/Areas of need (in own words): ""I'm OK  I stopped taking that other medicine" --referring to the Benztropine 1mg tab--which helped the EPS but caused blurry vision and extremely dry mouth and throat  The SE stopped when she stopped the medicine "  1  Long Term Goal: Maintain mood stability and anxiety control  Target Date:3-6 months  Person/Persons responsible for completion of goal: Abbi Rangel  2  Short Term Objective (s) - How will we reach this goal?:   A  Provider new recommended medication/dosage changes and/or continue medication(s): Pt is having depressive, anxiety , and PTSD Sxs unchanged from last visit  Bereavement is reducing gradually  No hypomanic/manic type Sxs  Tx options discussed and Pt accepts to increase Paroxetine for mood and anxiety--and is advised to halve the tab and call me immediately if any swing toward the manic side  Reduce Quetiapine back to the previous dose since the increase did not make a difference  Switch Alprazolam to Clonazepam for hopefully better overall coverage through the day  Reduce Benztropine due to SE on the 1mg tab  Pt again offered the PHP but declines at this time  Treatment plan done and Pt accepts the plan     D/C Alprazolam  Start Clonazepam 0 5mg (1/2) tab po qd-bid prn anxiety # 60 R2  Increase: Paroxetine 40mg (1) tab po qd # 30 R2  Reduce:  Quetiapine back to 100mg (1/2) tab po qAM and (1 1/2) tabs po qhs # 60 R2  Benztropine to 0 5mg (1) tab po qhs prn EPS tremor/jerking # 30 R2  Continue psychotherapy  Return 8-10 weeks, the sooner available appt, and place on cancellation list, call sooner prn  Pt declines to have a copy of Tx plan mailed to her--she has a Mychart acct  Target Date:3-6 months  Person/Persons Responsible for Completion of Goal: Lorraine Caputo  Progress Towards Goals: stable, continuing treatment  Treatment Modality: 3-6 months  Review due 180 days from date of this plan: 4-6 months  Expected length of service: ongoing treatment  My Physician/PA/NP and I have developed this plan together and I agree to work on the goals and objectives  I understand the treatment goals that were developed for my treatment

## 2021-03-02 DIAGNOSIS — F42.2 MIXED OBSESSIONAL THOUGHTS AND ACTS: ICD-10-CM

## 2021-03-02 DIAGNOSIS — F41.1 GAD (GENERALIZED ANXIETY DISORDER): ICD-10-CM

## 2021-03-02 DIAGNOSIS — F43.10 PTSD (POST-TRAUMATIC STRESS DISORDER): ICD-10-CM

## 2021-03-02 RX ORDER — PAROXETINE 30 MG/1
TABLET, FILM COATED ORAL
Qty: 30 TABLET | Refills: 2 | OUTPATIENT
Start: 2021-03-02

## 2021-03-05 DIAGNOSIS — R29.818 EXTRAPYRAMIDAL SYMPTOM: ICD-10-CM

## 2021-03-06 RX ORDER — BENZTROPINE MESYLATE 1 MG/1
TABLET ORAL
Qty: 30 TABLET | Refills: 2 | OUTPATIENT
Start: 2021-03-06

## 2021-04-17 DIAGNOSIS — F41.1 GAD (GENERALIZED ANXIETY DISORDER): ICD-10-CM

## 2021-04-17 DIAGNOSIS — R29.818 EXTRAPYRAMIDAL SYMPTOM: ICD-10-CM

## 2021-04-17 DIAGNOSIS — F31.30 BIPOLAR AFFECTIVE DISORDER, CURRENT EPISODE DEPRESSED, CURRENT EPISODE SEVERITY UNSPECIFIED (HCC): ICD-10-CM

## 2021-04-17 DIAGNOSIS — F43.10 PTSD (POST-TRAUMATIC STRESS DISORDER): ICD-10-CM

## 2021-04-17 DIAGNOSIS — F42.2 MIXED OBSESSIONAL THOUGHTS AND ACTS: ICD-10-CM

## 2021-04-19 RX ORDER — PAROXETINE HYDROCHLORIDE 40 MG/1
TABLET, FILM COATED ORAL
Qty: 30 TABLET | Refills: 2 | OUTPATIENT
Start: 2021-04-19

## 2021-04-19 RX ORDER — BENZTROPINE MESYLATE 0.5 MG/1
TABLET ORAL
Qty: 30 TABLET | Refills: 2 | OUTPATIENT
Start: 2021-04-19

## 2021-04-19 RX ORDER — QUETIAPINE FUMARATE 100 MG/1
TABLET, FILM COATED ORAL
Qty: 75 TABLET | Refills: 2 | OUTPATIENT
Start: 2021-04-19

## 2021-04-28 NOTE — PROGRESS NOTES
[facility]      Date of Service: 21  Primary Care Provider:   Channing Reddy MD       Name: Cristiano Monae       : 1995       Age:25 y o  Sex: female      MRN: [id number]      Chief Complaint:Annual Exam       Assessment and Plan:  22 y o  female exam      1  Health Maintenance  - Cervical Cancer Screening: normal PAP in 2019  - Labs: as below   - Immunizations: Reviewed  Recommend yearly flu vaccine  2  Other diagnoses addressed today:   Problem List Items Addressed This Visit        Endocrine    Subclinical hypothyroidism     Noted previously, but had resolved  Will repeat TSH         Relevant Orders    TSH, 3rd generation with Free T4 reflex       Hematopoietic and Hemostatic    Thrombocytosis ([address] )     Persistent for many years, likely reactive per heme/onc  Will recheck CBC         Relevant Orders    CBC       Other    Sinus tachycardia    Mixed hyperlipidemia     Lab Results   Component Value Date    CHOLESTEROL 174 2020    HDL 36 (L) 2020    LDLCALC 59 2020    TRIG 396 (H) 2020     She had improved on atorvastatin, though still with persistently elevated Triglycerides on repeat labs  She has not been taking atorvastatin for several months  Asked patient to resume today, will repeat labs in 3 to 4 months  She may benefit from addition of fibrates            Relevant Medications    atorvastatin (LIPITOR) 40 mg tablet    Other Relevant Orders    Lipid Panel with Direct LDL reflex    SUSANNA (generalized anxiety disorder)    Elevated alkaline phosphatase level     Elevated GGT and Alk Phos  She fits criteria for metabolic syndrome so likely some component of fatty liver  Repeat labs, if persistently abnormal would assess US         Elevated serum GGT level    Relevant Orders    Comprehensive metabolic panel    At risk for sleep apnea     Patient with significant daytime sleepiness, risk factors for BARRERA  Will order home study         Relevant Orders    Home Study    Elevated systolic blood pressure reading without diagnosis of hypertension     BP Readings from Last 3 Encounters:   04/29/21 150/90   11/16/20 118/72   10/19/20 124/74     Possibly due to weight gain  Counseled on weight loss  Will assess labs as below for possible secondary causes  Repeat at follow-up in 3 to 4 months         Relevant Orders    CBC    Comprehensive metabolic panel    TSH, 3rd generation with Free T4 reflex    Morbid obesity with BMI of 50 0-59 9, adult ([facility] [address] )     Counseled on decreasing intake of fast food, processed food  Discussed calorie density and recommended a primarily plant-based diet such as the Mediterranean diet  Will follow-up in 4 months to review weight loss           Other Visit Diagnoses     Annual physical exam    -  Primary    Screening for diabetes mellitus        Relevant Orders    Hemoglobin A1C    Comprehensive metabolic panel    Need for HPV vaccination        Relevant Orders    HPV VACCINE 9 VALENT IM (Completed)           Immunizations and preventive care screenings were discussed with patient today  Appropriate education was printed on patient's after visit summary  Counseling:  · Alcohol/drug use: discussed moderation in alcohol intake, the recommendations for healthy alcohol use, and avoidance of illicit drug use  · Dental Health: discussed importance of regular tooth brushing, flossing, and dental visits  · Injury prevention: discussed safety/seat belts, safety helmets, smoke detectors, carbon dioxide detectors    · Exercise: the importance of regular exercise/physical activity was discussed  Recommend exercise 3-5 times per week for at least 30 minutes  BMI Counseling: Body mass index is 53 85 kg/m²   The BMI is above normal  Nutrition recommendations include encouraging healthy choices of fruits and vegetables, decreasing fast food intake, consuming healthier snacks, limiting drinks that contain sugar and reducing intake of saturated and trans fat  Follow up next physical in 1 year  Subjective:    Micaela Monroy is a 22 y o  female with history of Bipolar disorder, anxiety and is here for a comprehensive physical exam      Acute Complaints: None  Patient has not been in the office since she established care in 2019  She did have a telehealth visit last May  She did have a colonoscopy last year that was normal  She saw heme/onc for leukocytosis and thrombocytosis, felt to be reactive  She follows with psychiatry for bipolar disorder and other mental health issues  She is currently on Seroquel 100 mg, Paxil 40 mg, benztropine 0 5 mg at bedtime, and Klonopin 0 5 mg as needed  She takes Klonopin 2 to 3 times weekly  She reports that she has not had the best experience with her psychiatrist      She is trying to get disability, though states her psychiatrist laughed at her when she asked  She states she has a hard time working  She states that she gets really sleepy from her medications  She will fall asleep if she sits down, states that she could fall asleep right now  She is concerned about her weight  Her father  of a heart attack last year and she has gained a lot of weight since then  She states that her medications make it harder to loose weight  She has been trying to eat healthier, but she and her  do eat out a lot  She eats two meals a day  Diet and Physical Activity  · Diet/Nutrition: does follow a well balanced diet  · Exercise: no formal exercise  General Health  · Vision: no vision problems and goes for regular eye exams  · Dental: regular dental visits  Gyn Health:       No LMP recorded  Patient has had an implant  IUD Since 2021   History of sexually transmitted infection No  History of abnormal pap smears  No     Patient is currently sexually active  heterosexual and  monogamous  years Birth control: Mirena IUD since        Histories Updated and Reviewed 4/29/2021:  Patient's Medications   New Prescriptions    No medications on file   Previous Medications    BENZTROPINE (COGENTIN) 0 5 MG TABLET    Take 1 tablet (0 5 mg total) by mouth daily at bedtime as needed for tremors (or jerking)    CLONAZEPAM (KLONOPIN) 0 5 MG TABLET    Take 0 5 tablets (0 25 mg total) by mouth 2 (two) times a day as needed for anxiety    PAROXETINE (PAXIL) 40 MG TABLET    Take 1 tablet (40 mg total) by mouth daily    QUETIAPINE (SEROQUEL) 100 MG TABLET    Take 1/2 tab po qAM and 1 and 1/2 tabs po qhs   Modified Medications    Modified Medication Previous Medication    ATORVASTATIN (LIPITOR) 40 MG TABLET atorvastatin (LIPITOR) 40 mg tablet       Take 1 tablet (40 mg total) by mouth daily at bedtime    Take 1 tablet (40 mg total) by mouth daily at bedtime   Discontinued Medications    No medications on file     No Known Allergies  Past Medical History:   Diagnosis Date    Bipolar disorder ([place] [address] )     Chronic kidney disease     Depression     Fatty liver     Hyperlipidemia 1/13/2020    Self-injurious behavior     Suicide attempt ([place] [address] )     2016     Social History     Socioeconomic History    Marital status: /Civil Union     Spouse name: Not on file    Number of children: Not on file    Years of education: Not on file    Highest education level: Not on file   Occupational History    Occupation: Unemployed   Social Needs    Financial resource strain: Not on file    Food insecurity     Worry: Not on file     Inability: Not on file   [employer] needs     Medical: Not on file     Non-medical: Not on file   Tobacco Use    Smoking status: Never Smoker    Smokeless tobacco: Never Used   Substance and Sexual Activity    Alcohol use: Yes     Frequency: Monthly or less     Drinks per session: 1 or 2     Binge frequency: Never     Comment: 1-2 mixed drinks once monthly or less    Drug use: Never    Sexual activity: Yes     Partners: Male     Birth control/protection: I U D  Lifestyle    Physical activity     Days per week: Not on file     Minutes per session: Not on file    Stress: Not on file   Relationships    Social connections     Talks on phone: Not on file     Gets together: Not on file     Attends [employer] service: Not on file     Active member of club or organization: Not on file     Attends meetings of clubs or organizations: Not on file     Relationship status: Not on file    Intimate partner violence     Fear of current or ex partner: Not on file     Emotionally abused: Not on file     Physically abused: Not on file     Forced sexual activity: Not on file   Other Topics Concern    Not on file   Social History Narrative    Not on file     Immunization History   Administered Date(s) Administered    DTaP,unspecified 1995, 03/30/1996, 05/29/1996, 01/29/1997, 12/07/1999    HPV9 01/29/2020, 04/29/2021    Hep B, Adolescent or Pediatric [birth year], 05/29/1996    HiB 1995, 03/20/1996, 05/29/1996, 01/29/1997    IPV 03/20/1996, 05/29/1996, 12/28/1996, 12/07/1999    MMR 10/16/1996, 12/07/1999, 07/30/2008    Tdap 01/10/2019    Varicella 03/20/1996, 05/29/1996       Objective:  /90   Pulse (!) 116   Temp 98 2 °F (36 8 °C)   Resp 14   Ht 5' 3" (1 6 m)   Wt (!) 138 kg (304 lb)   SpO2 97%   BMI 53 85 kg/m²   BP Readings from Last 3 Encounters:   04/29/21 150/90   11/16/20 118/72   10/19/20 124/74      Wt Readings from Last 3 Encounters:   04/29/21 (!) 138 kg (304 lb)   11/16/20 129 kg (285 lb)   10/19/20 128 kg (282 lb)      Physical Exam  Constitutional:       General: She is not in acute distress  Appearance: Normal appearance  She is obese  She is not ill-appearing or toxic-appearing  HENT:      Head: Normocephalic and atraumatic        Right Ear: Tympanic membrane, ear canal and external ear normal       Left Ear: Tympanic membrane, ear canal and external ear normal       Nose: Nose normal       Mouth/Throat:      Mouth: Mucous membranes are moist    Eyes:      Extraocular Movements: Extraocular movements intact  Conjunctiva/sclera: Conjunctivae normal    Neck:      Musculoskeletal: Normal range of motion and neck supple  No neck rigidity  Cardiovascular:      Rate and Rhythm: Normal rate and regular rhythm  Pulses: Normal pulses  Heart sounds: Normal heart sounds  No murmur  No friction rub  No gallop  Pulmonary:      Effort: Pulmonary effort is normal  No respiratory distress  Breath sounds: Normal breath sounds  Abdominal:      General: There is no distension  Palpations: Abdomen is soft  Tenderness: There is no abdominal tenderness  Musculoskeletal: Normal range of motion  Right lower leg: No edema  Left lower leg: No edema  Lymphadenopathy:      Cervical: No cervical adenopathy  Skin:     General: Skin is warm and dry  Findings: No erythema or rash  Neurological:      General: No focal deficit present  Mental Status: She is alert and oriented to person, place, and time     Psychiatric:         Mood and Affect: Mood normal          Behavior: Behavior normal          Lab Results   Component Value Date    WBC 13 29 (H) 08/17/2020    HGB 13 1 08/17/2020    HCT 43 2 08/17/2020    MCV 90 08/17/2020     (H) 08/17/2020     Lab Results   Component Value Date    SODIUM 137 08/17/2020    K 4 1 08/17/2020     08/17/2020    CO2 27 08/17/2020    BUN 11 08/17/2020    CREATININE 0 65 08/17/2020    CALCIUM 9 1 08/17/2020     Lab Results   Component Value Date    ALT 53 08/17/2020    AST 29 08/17/2020     (H) 04/14/2020    ALKPHOS 135 (H) 08/17/2020     Lab Results   Component Value Date    MHZ1ZNZAOUGR 1 860 08/17/2020     Lab Results   Component Value Date    CHOLESTEROL 174 08/17/2020    CHOLESTEROL 264 (H) 04/14/2020     Lab Results   Component Value Date    HDL 36 (L) 08/17/2020    HDL 32 (L) 04/14/2020     Lab Results   Component Value Date    TRIG 396 (H) 08/17/2020    TRIG 566 (H) 04/14/2020     No results found for: Gunner  Lab Results   Component Value Date    LDLCALC 59 08/17/2020       Lab Results   Component Value Date    HGBA1C 5 4 04/14/2020    HGBA1C 5 5 01/11/2019         Patient Care Team:  Curtis Valentino MD as PCP - General (Family Medicine)    Curtis Valentino MD    Note: Portions of the record may have been created with voice recognition software  Occasional wrong word or "sound a like" substitutions may have occurred due to the inherent limitations of voice recognition software  Read the chart carefully and recognize, using context, where substitutions have occurred

## 2021-04-28 NOTE — ASSESSMENT & PLAN NOTE
Lab Results   Component Value Date    CHOLESTEROL 174 08/17/2020    HDL 36 (L) 08/17/2020    LDLCALC 59 08/17/2020    TRIG 396 (H) 08/17/2020     She had improved on atorvastatin, though still with persistently elevated Triglycerides on repeat labs  She has not been taking atorvastatin for several months  Asked patient to resume today, will repeat labs in 3 to 4 months  She may benefit from addition of fibrates

## 2021-04-28 NOTE — ASSESSMENT & PLAN NOTE
Elevated GGT and Alk Phos  She fits criteria for metabolic syndrome so likely some component of fatty liver  Repeat labs, if persistently abnormal would assess US

## 2021-04-29 ENCOUNTER — OFFICE VISIT (OUTPATIENT)
Dept: FAMILY MEDICINE CLINIC | Facility: CLINIC | Age: 26
End: 2021-04-29
Payer: COMMERCIAL

## 2021-04-29 VITALS
HEART RATE: 116 BPM | TEMPERATURE: 98.2 F | RESPIRATION RATE: 14 BRPM | WEIGHT: 293 LBS | SYSTOLIC BLOOD PRESSURE: 150 MMHG | HEIGHT: 63 IN | OXYGEN SATURATION: 97 % | DIASTOLIC BLOOD PRESSURE: 90 MMHG | BODY MASS INDEX: 51.91 KG/M2

## 2021-04-29 DIAGNOSIS — R03.0 ELEVATED SYSTOLIC BLOOD PRESSURE READING WITHOUT DIAGNOSIS OF HYPERTENSION: ICD-10-CM

## 2021-04-29 DIAGNOSIS — Z91.89 AT RISK FOR SLEEP APNEA: ICD-10-CM

## 2021-04-29 DIAGNOSIS — E03.8 SUBCLINICAL HYPOTHYROIDISM: ICD-10-CM

## 2021-04-29 DIAGNOSIS — Z00.00 ANNUAL PHYSICAL EXAM: Primary | ICD-10-CM

## 2021-04-29 DIAGNOSIS — Z13.1 SCREENING FOR DIABETES MELLITUS: ICD-10-CM

## 2021-04-29 DIAGNOSIS — E66.01 MORBID OBESITY WITH BMI OF 50.0-59.9, ADULT (HCC): ICD-10-CM

## 2021-04-29 DIAGNOSIS — F41.1 GAD (GENERALIZED ANXIETY DISORDER): ICD-10-CM

## 2021-04-29 DIAGNOSIS — Z23 NEED FOR HPV VACCINATION: ICD-10-CM

## 2021-04-29 DIAGNOSIS — R00.0 SINUS TACHYCARDIA: ICD-10-CM

## 2021-04-29 DIAGNOSIS — R74.8 ELEVATED SERUM GGT LEVEL: ICD-10-CM

## 2021-04-29 DIAGNOSIS — R74.8 ELEVATED ALKALINE PHOSPHATASE LEVEL: ICD-10-CM

## 2021-04-29 DIAGNOSIS — D75.839 THROMBOCYTOSIS: ICD-10-CM

## 2021-04-29 DIAGNOSIS — E78.2 MIXED HYPERLIPIDEMIA: ICD-10-CM

## 2021-04-29 PROCEDURE — 99395 PREV VISIT EST AGE 18-39: CPT | Performed by: FAMILY MEDICINE

## 2021-04-29 PROCEDURE — 3008F BODY MASS INDEX DOCD: CPT | Performed by: FAMILY MEDICINE

## 2021-04-29 PROCEDURE — 90471 IMMUNIZATION ADMIN: CPT

## 2021-04-29 PROCEDURE — 1036F TOBACCO NON-USER: CPT | Performed by: FAMILY MEDICINE

## 2021-04-29 PROCEDURE — 90651 9VHPV VACCINE 2/3 DOSE IM: CPT

## 2021-04-29 RX ORDER — ATORVASTATIN CALCIUM 40 MG/1
40 TABLET, FILM COATED ORAL
Qty: 90 TABLET | Refills: 4 | Status: SHIPPED | OUTPATIENT
Start: 2021-04-29

## 2021-04-29 NOTE — ASSESSMENT & PLAN NOTE
Counseled on decreasing intake of fast food, processed food  Discussed calorie density and recommended a primarily plant-based diet such as the Mediterranean diet     Will follow-up in 4 months to review weight loss

## 2021-04-29 NOTE — PATIENT INSTRUCTIONS
Mediterranean Diet   AMBULATORY CARE:   A Mediterranean diet  is a meal plan that includes foods that are commonly eaten in countries that border the [place]  This meal plan may provide several health benefits  These include losing or maintaining weight, and decreasing blood pressure, blood sugar, and cholesterol levels  It may also help protect against certain health conditions such as heart disease, cancer, type 2 diabetes, and Alzheimer disease  Work with a dietitian to develop a meal plan that is right for you  Foods to include in the [facility] diet:   · Include fruits and vegetables in each meal   Eat a variety of fresh fruits and vegetables  · Choose whole grains every day  These foods include whole-grain breads, pastas, and cereals  It also includes brown rice, quinoa, and millet  · Use unsaturated fats instead of saturated fats  Cook with olive or canola oil  Limit saturated fats, such as butter, margarine, and shortening  Saturated fat is an unhealthy fat that can increase your cholesterol levels  · Choose plant foods, poultry, and fish as your main sources of protein  ? Eat plant-based foods that provide protein,  such as lentils, beans, chickpeas, nuts, and seeds  Choose mostly plant-based foods in place of meat on most days of the week  ? Eat protein foods high in omega-3 fats  Fish high in omega-3 fats include salmon, trout, and tuna  Include these types of fish 1 or 2 times each week  Limit fish high in mercury, such as shark, swordfish, tilefish, and courtney mackerel  Omega-3 fats are also found in walnuts and flaxseed  ? Choose poultry (chicken or turkey)  without skin instead of red meat  Red meat is high in saturated fat  Limit eggs and high-fat meats, such as house, sausage, and hot dogs  · Choose low-fat dairy foods  such as nonfat or 1% milk, or low-fat almond, cashew, or soy milk  Other examples include low-fat cheese, yogurt, and cottage cheese  · Limit sweets  Limit your intake of high-sugar foods, such as soda, desserts, and candy  · Talk to your healthcare provider about alcohol  Studies have shown that moderate intake of wine may reduce the risk of heart disease  A moderate amount of wine is 1 serving for women and men 65 years and older each day  Two servings is recommended for men 24to 59years of age each day  A serving of wine is 5 ounces  Other things you need to know if you follow the Mediterranean diet:   · Include foods high in iron and vitamin C   Plant-based foods that are high in iron include spinach, beans, tofu, and artichoke  Eat a serving of vitamin C with any iron-rich food to help your body absorb more iron  Examples include oranges, strawberries, cantaloupe, broccoli, and yellow peppers  · Get regular physical activity  The Mediterranean diet will have the most benefit if you get regular physical activity  Get 30 minutes of physical activity at least 5 days a week  Choose physical activities that increase your heart rate  Examples include walking, hiking, swimming, and riding a bike  Ask your healthcare provider about the best exercise plan for you  © Copyright [address] Information is for End User's use only and may not be sold, redistributed or otherwise used for commercial purposes  All illustrations and images included in CareNotes® are the copyrighted property of A D A M , Inc  or Steven Hudson  The above information is an  only  It is not intended as medical advice for individual conditions or treatments  Talk to your doctor, nurse or pharmacist before following any medical regimen to see if it is safe and effective for you

## 2021-04-29 NOTE — ASSESSMENT & PLAN NOTE
BP Readings from Last 3 Encounters:   04/29/21 150/90   11/16/20 118/72   10/19/20 124/74     Possibly due to weight gain  Counseled on weight loss  Will assess labs as below for possible secondary causes  Repeat at follow-up in 3 to 4 months

## 2021-05-10 ENCOUNTER — TELEPHONE (OUTPATIENT)
Dept: PSYCHIATRY | Facility: CLINIC | Age: 26
End: 2021-05-10

## 2021-05-10 ENCOUNTER — DOCUMENTATION (OUTPATIENT)
Dept: BEHAVIORAL/MENTAL HEALTH CLINIC | Facility: CLINIC | Age: 26
End: 2021-05-10

## 2021-05-10 DIAGNOSIS — F43.10 PTSD (POST-TRAUMATIC STRESS DISORDER): ICD-10-CM

## 2021-05-10 DIAGNOSIS — F42.2 MIXED OBSESSIONAL THOUGHTS AND ACTS: ICD-10-CM

## 2021-05-10 DIAGNOSIS — F31.30 BIPOLAR AFFECTIVE DISORDER, CURRENT EPISODE DEPRESSED, CURRENT EPISODE SEVERITY UNSPECIFIED (HCC): ICD-10-CM

## 2021-05-10 DIAGNOSIS — F41.1 GAD (GENERALIZED ANXIETY DISORDER): ICD-10-CM

## 2021-05-10 DIAGNOSIS — R29.818 EXTRAPYRAMIDAL SYMPTOM: ICD-10-CM

## 2021-05-10 RX ORDER — QUETIAPINE FUMARATE 100 MG/1
TABLET, FILM COATED ORAL
Qty: 75 TABLET | Refills: 2 | Status: SHIPPED | OUTPATIENT
Start: 2021-05-10

## 2021-05-10 RX ORDER — PAROXETINE HYDROCHLORIDE 40 MG/1
40 TABLET, FILM COATED ORAL DAILY
Qty: 30 TABLET | Refills: 2 | Status: SHIPPED | OUTPATIENT
Start: 2021-05-10

## 2021-05-10 RX ORDER — CLONAZEPAM 0.5 MG/1
0.25 TABLET ORAL 2 TIMES DAILY PRN
Qty: 30 TABLET | Refills: 2 | Status: SHIPPED | OUTPATIENT
Start: 2021-05-10

## 2021-05-10 RX ORDER — BENZTROPINE MESYLATE 0.5 MG/1
0.5 TABLET ORAL
Qty: 30 TABLET | Refills: 2 | Status: SHIPPED | OUTPATIENT
Start: 2021-05-10

## 2021-05-10 NOTE — TELEPHONE ENCOUNTER
Elizabethvernell Dedrick decided to terminate psychiatric services and the discharge note was completed  I am provided a set of Rxs for a total of 3 month's worth to give time for her to transition to a new provider    I e-scribed the following to her designated [employer]:  Paroxetine 40mg (1) tab po qd # 30 R2  Clonazepam 0 5mg (1/2) tab po qd-bid prn anxiety # 60 R2  Quetiapine back to 100mg (1/2) tab po qAM and (1 1/2) tabs po qhs # 60 R2  Benztropine to 0 5mg (1) tab po qhs prn EPS tremor/jerking # 30 R2

## 2021-05-10 NOTE — TELEPHONE ENCOUNTER
Cx All: Patient contacted office and spoke with writer to cancel all appointments for virtual with Kee Carter PA-C  Reason: Patient stated this was the 4th time provider was late for the appt  [facility] no longer interested in recieving services  at this location     Patient also called about today's visit 5/10/21 at 330pm, said she waited for provider but did not want to wait any longer  Provider offered a time slot for 4pm, but patient was not interested

## 2021-05-10 NOTE — LETTER
05/11/21       [place]   [address]  [facility] [address]       Dear Chris Rodriguez:    Since you are no longer interested in treatment with [facility], PA-C at [address] [facility] [address], your chart is being closed at this time  If you wish to return to [address], you will need to have another initial assessment and intake appointment  Please call [phone] to schedule a new psychiatric intake if you are interested in doing so  Please follow-up with your primary care provider for continual care  When you have scheduled an appointment with another agency, please feel free to complete a release of information so that your records can be transferred to your new provider prior to your first appointment  This will aid with the continuity of your care  Sincerely,           [facility], PA-C      [facility]        We will continue to provide psychotropic medications and/or emergency counseling as deemed appropriate by clinical staff for 45 days from receipt of this letter  For a referral to another agency, we would suggest that you contact your primary health care provider or insurance company  Please see Provider's List of agencies below:    Outpatient Mental Health Adult  [facility]  [address] [facility]   [phone]   Suzanna Grady to MY/ Jerzy Palmer 19 drive  [address] [facility] 235 [facility]  279 [facility] Kenneth Fernandez MD [facility]  6[address] Kody Moffett MD, [address] [facility] [address], Adolescents and Family  [place] [facility]: 100 [facility], [address] [place] [address] 27 800 Medical [facility] [address]  [address]o, [address]  and Jorge, [address] Luanne Moffett, [address] (14 and over)  [address]  100 [facility]  [address]  [place], Naj[address]  Varsha Martell Slovenian Speaking  [facility] [address]   [address], [place] [address]  [address]:   [facility], [address] Phone: [phone] [address] [address]  [address] [facility] [phone] 301 [facility]:   [address] LKMYOYXA:[address] 29 [facility], [address] [place] [address] Phone: [phone] Outpatient: [address] [place], [address] Phone: [address] [employer]  [phone] / Debbie  [phone]  Drug & Alcohol Services:  [facility]:   [phone] or [address]  [facility]:  [phone] or [address]:  [address] [place] [address] Street:  [address] [facility] [address]: [phone]    [address]:   [address] Alcohol Commission:  [address] [facility] [address], [address]  Phone: [phone]:    [facility]:  [address] [place] [address] Avenue: [phone] or [address] / [place]: [address]: [phone]    [facility]: [address] [place] [address] St: [phone] ([phone])    Nik Kay: [phone]    National Suicide Prevention Hotline:  1-590.514.5407 Tru Daniel
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
## 2021-06-03 ENCOUNTER — TELEPHONE (OUTPATIENT)
Dept: SLEEP CENTER | Facility: CLINIC | Age: 26
End: 2021-06-03

## 2021-07-01 ENCOUNTER — AMB VIDEO VISIT (OUTPATIENT)
Dept: OTHER | Facility: HOSPITAL | Age: 26
End: 2021-07-01

## 2021-07-01 PROCEDURE — ECARE PR SL URGENT CARE VIRTUAL VISIT: Performed by: FAMILY MEDICINE

## 2021-07-01 NOTE — CARE ANYWHERE EVISITS
Visit Summary for [facility] - Gender: Female - Date of Birth: [id number]  Date: [id number] - Duration: 3 minutes  Patient: [facility]   [place]  Provider: Ana Grajeda    Patient Contact Information  Address  [facility]; [address]  [phone]    Visit Topics  Sinus infection I got from my  [Added By: Self - 2021-07-01]    Triage Questions   What is your current physical address in the event of a medical emergency? Answer []  Are you allergic to any medications? Answer []  Are you now or could you be pregnant? Answer []  Do you have any immune system compromise or chronic lung   disease? Answer []  Do you have any vulnerable family members in the home (infant, pregnant, cancer, elderly)? Answer []     Conversation Transcripts  [0A][0A] [Notification] You are connected with Ana Grajeda, Family Physician [0A][Notification] Juan Jonathan is located in [place]  [0A][Notification] Juan Castillo has shared health history  [place]  [0A][Notification] Ana Grajeda has added a   diagnosis/procedure code  [0A][Notification] Ana Grajeda has added a diagnosis/procedure code  [0A]    Diagnosis  Acute upper respiratory infection, unspecified  Contact w and exposure to oth viral communicable diseases    Procedures    Medications Prescribed    No prescriptions ordered    Provider Notes  [0A][0A] [0A]We strongly encourage you to share the following record of today's visit with your primary care physician  [0A][0A][0A][0A]Contact phone number:[0A][0A][0A][0A]Mode of Communication: Video[0A][0A][0A][0A]HPI: Over the past 2 days patient has   developed scratchy throat, rhinitis, and a mild dry cough  The patient feels a little achy and tired, but has not had fever   sick as well was covid negative  No vaccinations  [0A][0A][0A][0A]PMH: Bipolar, HLD[0A][0A]PSH: None[0A][0A]Smoking HX:   Nonsmoker[0A][0A]Meds:BENZTROPINE MESYLATE 0 5MG [id number] 40 MG [id number] CALCIUM 40MG [id number] HCL 40 MG [id number] HCL 40MG   [id number] F[0A]Allergies: NKDA[0A][0A]LMP: IUD[0A][0A][0A][0A]PE: [0A][0A]Gen: Well appearing, in no acute distress[0A][0A]Nose: Congested[0A][0A]Resp: Nasal voice and throat cleaning  No respiratory distress or   wheeze[0A][0A]Sinus: No sinus tenderness  No SOB or wheeze  [0A][0A][0A]Assessment1  COVID-19 riskIf the patient is suspected of having COVID-19, choose one of the categories below and delete the others[0A]    - COVID-Like Illness, Stable   (CLI-S)[0A]2  Diagnosis and ICD-10 code[0A] If the patient is suspected of having COVID-19, choose one of the categories below and delete the others[0A]- CLI-S: symptom code e g  R05 (cough), R50 9, (fever) AND Z20 828 (suspected exposure to other viral   communicable diseases[0A]- Plan  COVID-Like Illness- Stable with Complicating Factors (CLI-S-CF) or COVID-Like Illness Stable (CLI-S)[0A]You have been diagnosed with a viral respiratory infection that may be due to coronavirus (Covid-19)  At this time,   your provider has determined that you do not require an emergency evaluation  If your symptoms progress or worsen, it may be necessary to seek additional care in person  1      Testing for COVID-19- Lab testing for COVID-19 might be recommended if it is   available in your community  To locate potential test sites in your area, please search the following websites:a       HHS  gov ([employer]  [facility]  com   ([employer] is  com/blog/drive-thru-coronavirus-scggpto-dcyh-is/) c       [employer] (https://[employer]/corona-virus-testing-sites/)d  Sign up on pharmacy website for testing 2  Lab results-a       Positive test- These   tests are not 100% perfect but if you tested positive for COVID-19, this simply confirms that COVID-19 is the likely cause of your symptoms  You do not need to take further action unless you are experiencing worsening of your condition  You should   follow all of the above guidance to avoid infecting others around you                                       i      If you have a positive test result you may stop self-isolating when:1  You have had no fever for at least 1 day AND2  Other   symptoms have improved (such as cough or shortness of breath) AND3  At least 10 days have passed since your symptoms first appeared4  Also, after isolation ends, you will need to be especially careful to avoid close contact and wear a mask in the   presence of other people, even in the home  5      Also, if you return to work outside the home you should check your temperature every day prior to starting your shift and only work if it is normal   b      Negative test- This confirms that COVID-19 is   not likely the cause of your symptoms  You probably are infected with another common respiratory virus  These tests are not 100% perfect, though, so there is still a small chance that Covid-19 is the cause of your symptoms  i      If you have a negative test result you may stop self-isolating when-1  You have had no fever for at least 1 days AND2  Other symptoms have improved (such as cough or shortness of breath) c  If you are told to self-isolate,   please do the following-                                      i      Stay at home except to receive medical care                                     ii      Separate yourself from other people and animals in the home and if possible, use separate   bathrooms                                    iii       Call ahead before visiting any health facility                                    iv      Wear a facemask if around others                                     v      Cover your coughs and sneezes vi      Clean your hands often                                   vii  Clean âhigh touchâ surfaces every day d  Monitor your symptoms for worsening and seek medical attention immediately if your illness worsens   (for example difficulty breathing, dizziness, dark colored urine)  Before seeking care, call ahead to the facility and tell them that you may have Covid-19  Put on a facemask (or bandana) before entering the facility  Asymptomatic COVID-19 ExposureYour   doctor has determined that you have had a potentially significant exposure to COVID-19  AOption 1: self-monitoring for symptoms continues for 14 days but actual quarantine can end after 10 days without additional testing IF no symptoms have developed   during daily monitoring  Strict infection control measures such as mask-wearing and avoidance of public gatherings is still advised  BOption 2: self-monitoring for symptoms continues for 14 days but actual quarantine can end after 7 days IF:A diagnostic   specimen collected and tested 48 hours before the proposed end of isolation (i e  on day 5 of a 7-day isolation) is negative ANDStrict infection control measures such as mask-wearing and avoidance of public gatherings is still advised, ANDNo symptoms   have developed during daily monitoring  1      Testing for COVID-19- Lab testing for COVID-19 might be recommended if it is available in your community  To locate potential test sites in your area, please search the following websites:a       HHS  gov   ([employer]  [facility]  com ([employer] is  com/blog/drive-thru-coronavirus-hbrzeti-ppbm-eu/) c       [employer]   (https://[employer]/corona-virus-testing-sites/)d  If your clinician has recommended lab testing for you, please see the attached referral form  2      Lab results-a       Positive test- These tests are not 100% perfect but if you tested   positive for COVID-19, this simply confirms that you have contracted the virus which causes COVID-19  You do not need to take further action unless you experience worsening of your condition  You should follow all of the above guidance to avoid infecting   others around you                                                i      If you have a positive test result you may stop self-quarantining when:1  A minimum of 10 days since your positive test results has passed AND2  You have no symptoms such as   fever, cough or shortness of breath AND3  Also, after quarantine ends, you will need to be especially careful to avoid close contact and wear a mask in the presence of other people  4      Also, if you return to work outside the home you should   check your temperature every day prior to starting your shift and only work if it is normal   b      Negative test- This confirms that you have not likely contracted the virus that causes COVID-19  These tests are not 100% perfect, though, so there is   still a small chance you do have Covid-19                                                i      Your clinician has advised you  (DO) need to self-quarantine for 14 days  since exposure or 10 days after the start of your symptoms out of an abundance of   caution  c       If you are told to quarantine, please do the following-                                               i      Stay at home except to receive medical care                                              ii      Separate yourself from other   people and animals in the home and if possible, use separate bathrooms                                             iii       Call ahead before visiting any health facility                                             iv      Wear a facemask if around   others                                              v      Cover your coughs and sneezes                                             vi      Clean your hands often                                            vii  Clean âhigh touchâ surfaces every   day d  Monitor your symptoms for worsening and seek medical attention immediately if your illness worsens (for example difficulty breathing, dizziness, dark colored urine)  Before seeking care, call ahead to the facility and tell them that you may   have Covid-19  Put on a facemask (or bandana) before entering the facility  3      If testing is not recommended or is unavailable, the following protocol is recommended, you are advised to quarantine untila  14 days has passed since the presumed   exposureb  AND you have no new symptoms such as fever, cough or shortness of breath  c       If you are told to quarantine, please do the following-                                               i      Stay at home except to receive medical care                                                ii      Separate yourself from other people and animals in the home and if possible, use separate bathrooms                                             iii  Call ahead before visiting any health facility                                               iv      Wear a facemask in public and if around others                                              v      Cover your coughs and sneezes                                             vi      Clean your hands often                                              vii  Clean âhigh touchâ surfaces every dayd  Monitor for symptoms of COVID-19 including fever, cough, shortness of breath and seek medical attention immediately if you develop significant symptoms (for   example difficulty breathing, dizziness, dark colored urine)  i      Before seeking care, call ahead to the facility and tell them that you may have Covid-19  Put on a facemask (or bandana) before entering   the facility   Other Patient InstructionsSymptom relief-You may use supportive treatment with in-room humidifier, fluids, rest, and Mucinex or other guaifenesin-containing over the counter cough product  Acetaminophen (Tylenol) can help with the fever and   aches  There is conflicting evidence on whether ibuprofen (Advil, Motrin) or naproxen (Aleve) might prolong COVID-19 symptoms  Prescriptions (if any) â  Prevent Infection- Please help prevent the spread of respiratory diseases by doing the   followin  Avoid close contact with people who are sick  2  Avoid touching your eyes, nose, and mouth  3  Stay home when you are sick  4  Cover your cough or sneeze with a tissue, then throw the tissue in the trash  5  Clean and   disinfect frequently touched objects and surfaces using a regular household cleaning spray or wipe  6  Wear a facemask when in public to protect yourself and others7  Wash your hands often with soap and water for at least 20 seconds, especially   after going to the bathroom; before eating; and after blowing your nose, coughing, or sneezing  Follow up1  Please reconnect for another online visit or see your PCP or urgent care provider if symptoms worsen or new symptoms appear at any point, or   if still with fever or additional symptoms after a 3-4 more days  2  If you received a prescription at this visit and have any questions or problems with the prescription, call [phone] for assistance  3  Patient indicates understanding and   agrees with plan  4  Please print a copy of this note and send it to your regular doctor or take it to your next visit so it may be included in your medical record  5      Please see your PCP on a regular basis for prevention services, sooner for   follow up of chronic medical conditions      [0A][0A]    Electronically signed by: Omkar Erazo(NPI [id number])

## 2021-08-04 ENCOUNTER — TELEPHONE (OUTPATIENT)
Dept: PSYCHIATRY | Facility: CLINIC | Age: 26
End: 2021-08-04

## 2021-08-04 NOTE — TELEPHONE ENCOUNTER
Called patient to schedule follow up on 8/4/21 and patient stated she does not wish to be schedule with provider any longer   She is switching to a different psychiatrist  Patient is ok with being discharged by [facility]

## 2021-10-13 ENCOUNTER — HOSPITAL ENCOUNTER (OUTPATIENT)
Dept: SLEEP CENTER | Facility: CLINIC | Age: 26
Discharge: HOME | End: 2021-10-13
Payer: COMMERCIAL

## 2021-10-13 DIAGNOSIS — Z91.89 AT RISK FOR SLEEP APNEA: ICD-10-CM

## 2021-10-13 PROCEDURE — G0399 HOME SLEEP TEST/TYPE 3 PORTA: HCPCS

## 2021-10-15 DIAGNOSIS — J32.0 CHRONIC MAXILLARY SINUSITIS: ICD-10-CM

## 2021-10-15 DIAGNOSIS — Z91.89 AT RISK FOR SLEEP APNEA: Primary | ICD-10-CM

## 2021-10-20 NOTE — INTERVAL H&P NOTE
H&P reviewed  After examining the patient I find no changes in the patients condition since the H&P had been written      Vitals:    03/14/20 1025   BP: 116/73   Pulse: 105   Resp: 20   Temp: 97 9 °F (36 6 °C)   SpO2: 96% How Severe Is Your Skin Lesion?: mild Has Your Skin Lesion Been Treated?: not been treated Is This A New Presentation, Or A Follow-Up?: Skin Lesion

## 2022-01-10 ENCOUNTER — AMB VIDEO VISIT (OUTPATIENT)
Dept: OTHER | Facility: HOSPITAL | Age: 27
End: 2022-01-10

## 2022-01-10 DIAGNOSIS — R05.9 COUGH: Primary | ICD-10-CM

## 2022-01-10 PROCEDURE — ECARE PR SL URGENT CARE VIRTUAL VISIT: Performed by: NURSE PRACTITIONER

## 2022-01-10 NOTE — PROGRESS NOTES
Video Visit - Yessy Burgos 32 y o  female MRN: [id number]    REQUIRED DOCUMENTATION:         1  This service was provided via [facility]  2  Provider located at [address] [place] [id number]  3  [facility] provider: JAVAN Bustamante  4  Identify all parties in room with patient during [facility] visit:  Patient  5  After connecting through [employer], patient was identified by name and date of birth  Patient was then informed that this was a Telemedicine visit and that the exam was being conducted confidentially over secure lines  My office door was closed  No one else was in the room  Patient acknowledged consent and understanding of privacy and security of the Telemedicine visit  I informed the patient that I have reviewed their record in Epic and presented the opportunity for them to ask any questions regarding the visit today  The patient agreed to participate  This is a 40-year-old female here today for video visit  She states she has been sick for about 2 weeks  She states she was sick then got better but now sick again  She states she is coughing up blood-tinged mucus  She denies any shortness of breath  She does have some chest pain when she takes a deep breath  She states she has a T-max of a 100°  She states she fell as though symptoms started with a sinus infection the symptoms went away and then return  She states all the symptoms started again 4 days ago  She is eating and drinking okay  She is taking DayQuil NyQuil  Significant other is positive with COVID  Physical Exam  Constitutional:       Appearance: She is ill-appearing (mild)  Pulmonary:      Comments: No cough on exam  Neurological:      Mental Status: She is alert and oriented to person, place, and time     Psychiatric:         Mood and Affect: Mood normal          Behavior: Behavior normal        Diagnoses and all orders for this visit:    Cough      Patient Instructions   At this point would recommend you go to an urgent care for a full evaluation as symptoms have been persistent for 2 weeks  You are now having some blood-tinged mucus that you stay is coming from her chest   Would recommend in-person evaluation to listen to her lungs checked her oxygen and possible chest x-ray  Verbalized understanding of these instructions  Follow up with PCP if not improved, if symptoms are worse, go to the ER

## 2022-01-11 ENCOUNTER — APPOINTMENT (OUTPATIENT)
Dept: RADIOLOGY | Facility: MEDICAL CENTER | Age: 27
End: 2022-01-11
Payer: COMMERCIAL

## 2022-01-11 ENCOUNTER — OFFICE VISIT (OUTPATIENT)
Dept: URGENT CARE | Facility: MEDICAL CENTER | Age: 27
End: 2022-01-11
Payer: COMMERCIAL

## 2022-01-11 VITALS
RESPIRATION RATE: 20 BRPM | OXYGEN SATURATION: 96 % | HEIGHT: 62 IN | HEART RATE: 136 BPM | WEIGHT: 280 LBS | BODY MASS INDEX: 51.53 KG/M2 | TEMPERATURE: 101.4 F

## 2022-01-11 DIAGNOSIS — J20.9 ACUTE BRONCHITIS, UNSPECIFIED ORGANISM: ICD-10-CM

## 2022-01-11 DIAGNOSIS — J18.9 PNEUMONIA OF RIGHT UPPER LOBE DUE TO INFECTIOUS ORGANISM: ICD-10-CM

## 2022-01-11 DIAGNOSIS — J20.9 ACUTE BRONCHITIS, UNSPECIFIED ORGANISM: Primary | ICD-10-CM

## 2022-01-11 PROCEDURE — 71046 X-RAY EXAM CHEST 2 VIEWS: CPT

## 2022-01-11 PROCEDURE — U0003 INFECTIOUS AGENT DETECTION BY NUCLEIC ACID (DNA OR RNA); SEVERE ACUTE RESPIRATORY SYNDROME CORONAVIRUS 2 (SARS-COV-2) (CORONAVIRUS DISEASE [COVID-19]), AMPLIFIED PROBE TECHNIQUE, MAKING USE OF HIGH THROUGHPUT TECHNOLOGIES AS DESCRIBED BY CMS-2020-01-R: HCPCS | Performed by: PHYSICIAN ASSISTANT

## 2022-01-11 PROCEDURE — U0005 INFEC AGEN DETEC AMPLI PROBE: HCPCS | Performed by: PHYSICIAN ASSISTANT

## 2022-01-11 PROCEDURE — 99213 OFFICE O/P EST LOW 20 MIN: CPT | Performed by: PHYSICIAN ASSISTANT

## 2022-01-11 RX ORDER — ALBUTEROL SULFATE 90 UG/1
2 AEROSOL, METERED RESPIRATORY (INHALATION) EVERY 4 HOURS PRN
Qty: 8.5 G | Refills: 0 | Status: SHIPPED | OUTPATIENT
Start: 2022-01-11

## 2022-01-11 RX ORDER — AZITHROMYCIN 250 MG/1
TABLET, FILM COATED ORAL
Qty: 6 TABLET | Refills: 0 | Status: SHIPPED | OUTPATIENT
Start: 2022-01-11 | End: 2022-01-15

## 2022-01-11 RX ORDER — LAMOTRIGINE 100 MG/1
TABLET ORAL
COMMUNITY
Start: 2021-12-01

## 2022-01-11 NOTE — PROGRESS NOTES
[facility]        NAME: Ruslan Coates is a 32 y o  female  : 1995    MRN: [id number]  DATE: 2022  TIME: 12:41 PM    Assessment and Plan   Acute bronchitis, unspecified organism [J20 9]  1  Acute bronchitis, unspecified organism           Patient Instructions           Chief Complaint     Chief Complaint   Patient presents with    Cough     two weeks     Diarrhea     couple days    Vomiting    Fever     T-Max 101         History of Present Illness       Is a 61-year-old female complaining of a 2 week illness consisting of productive cough fatigue body aches intermittent chills and fever  More recently over the last few days has developed exertional shortness of breath some mild hemoptysis  She relates some COVID exposure 2+ weeks ago  She has also begun experiencing diarrhea as of yesterday and intermittent vomiting  She denies abdominal pain  Cough  Associated symptoms include chills, a fever, headaches, myalgias, shortness of breath and wheezing  Pertinent negatives include no chest pain, eye redness, rash, rhinorrhea or sore throat  Diarrhea   Associated symptoms include chills, coughing, a fever, headaches, myalgias and vomiting  Pertinent negatives include no abdominal pain  Vomiting   Associated symptoms include chills, coughing, diarrhea, a fever, headaches and myalgias  Pertinent negatives include no abdominal pain or chest pain  Fever  Associated symptoms include chills, congestion, coughing, diaphoresis, fatigue, a fever, headaches, myalgias and vomiting  Pertinent negatives include no abdominal pain, chest pain, rash or sore throat  Review of Systems   Review of Systems   Constitutional: Positive for activity change, appetite change, chills, diaphoresis, fatigue and fever  Negative for unexpected weight change  HENT: Positive for congestion  Negative for facial swelling, rhinorrhea, sinus pain and sore throat  Eyes: Negative for redness  Respiratory: Positive for cough, chest tightness, shortness of breath and wheezing  Cardiovascular: Negative for chest pain  Gastrointestinal: Positive for diarrhea and vomiting  Negative for abdominal pain  Musculoskeletal: Positive for myalgias  Skin: Negative for rash  Neurological: Positive for headaches           Current Medications       Current Outpatient Medications:     atorvastatin (LIPITOR) 40 mg tablet, Take 1 tablet (40 mg total) by mouth daily at bedtime, Disp: 90 tablet, Rfl: 4    benztropine (COGENTIN) 0 5 mg tablet, Take 1 tablet (0 5 mg total) by mouth daily at bedtime as needed for tremors (or jerking), Disp: 30 tablet, Rfl: 2    clonazePAM (KlonoPIN) 0 5 mg tablet, Take 0 5 tablets (0 25 mg total) by mouth 2 (two) times a day as needed for anxiety, Disp: 30 tablet, Rfl: 2    lamoTRIgine (LaMICtal) 100 mg tablet, TAKE ONE TABLET BY MOUTH EVERY 24 HRS, Disp: , Rfl:     PARoxetine (PAXIL) 40 MG tablet, Take 1 tablet (40 mg total) by mouth daily, Disp: 30 tablet, Rfl: 2    QUEtiapine (SEROquel) 100 mg tablet, Take 1/2 tab po qAM and 1 and 1/2 tabs po qhs, Disp: 75 tablet, Rfl: 2    Current Allergies     Allergies as of 01/11/2022    (No Known Allergies)            The following portions of the patient's history were reviewed and updated as appropriate: allergies, current medications, past family history, past medical history, past social history, past surgical history and problem list      Past Medical History:   Diagnosis Date    B[facility] disorder ([facility] [address] )     Chronic kidney disease     Depression     Fatty liver     Hyperlipidemia 1/13/2020    Self-injurious behavior     Suicide attempt ([facility] [address] )     2016       Past Surgical History:   Procedure Laterality Date    NO PAST SURGERIES         Family History   Problem Relation Age of Onset    Cervical cancer Mother     Heart attack Mother     Anxiety disorder Mother     Depression Mother     Diverticulitis Mother     Heart attack Father     Hypertension Father     Hyperlipidemia Father     No Known Problems Brother     Dementia Maternal Grandmother     Alzheimer's disease Maternal Grandmother     Throat cancer Paternal Grandfather     Schizophrenia Maternal Aunt          Medications have been verified  Objective   Pulse (!) 136   Temp (!) 101 4 °F (38 6 °C)   Resp 20   Ht 5' 2" (1 575 m)   Wt 127 kg (280 lb)   SpO2 96%   BMI 51 21 kg/m²        Physical Exam     Physical Exam  Vitals and nursing note reviewed  Constitutional:       General: She is not in acute distress  Appearance: Normal appearance  She is not ill-appearing or toxic-appearing  HENT:      Head: Normocephalic  Right Ear: Tympanic membrane normal       Left Ear: Tympanic membrane normal       Nose: Congestion present  Mouth/Throat:      Mouth: Mucous membranes are moist    Eyes:      Conjunctiva/sclera: Conjunctivae normal       Pupils: Pupils are equal, round, and reactive to light  Cardiovascular:      Rate and Rhythm: Normal rate and regular rhythm  Pulses: Normal pulses  Pulmonary:      Effort: Pulmonary effort is normal  No respiratory distress  Breath sounds: Decreased breath sounds, wheezing and rhonchi present  Musculoskeletal:         General: No swelling  Cervical back: Normal range of motion  Skin:     General: Skin is warm and dry  Neurological:      Mental Status: She is alert  Psychiatric:         Mood and Affect: Mood normal          Behavior: Behavior normal        Medical decision making note:  Patient has pneumonia based on right upper lobe infiltrate on the chest x-ray  Will prescribe a Z-Chalo for this as well as albuterol inhaler for the bronchospasm present on exam   Patient understands to follow up with PCP in 1 week and to proceed to the ER immediately for any severely worsening symptoms

## 2022-01-11 NOTE — CARE ANYWHERE EVISITS
Visit Summary for Shane Stewart - Gender: Female - Date of Birth: [id number]  Date: [id number] - Duration: 6 minutes  Patient: Shane Stewart  Provider: [id number]    Patient Contact Information  Address  [address] [facility]; [address]  [phone]    Visit Topics  Flu-Like Symptoms [Added By: Self - 2022-01-10]  Cold [Added By: Self - 2022-01-10]    Triage Questions   What is your current physical address in the event of a medical emergency? Answer []  Are you allergic to any medications? Answer []  Are you now or could you be pregnant? Answer []  Do you have any immune system compromise or chronic lung   disease? Answer []  Do you have any vulnerable family members in the home (infant, pregnant, cancer, elderly)? Answer []     Conversation Transcripts  [0A][0A] [Notification] You are connected with Robinson Chan, Urgent Care Specialist [0A][Notification] Shane Stewart is located in [place]  [0A][Notification] Shane Stewart has shared health history  Kale Delgado  [0A]    Diagnosis  Contact with and (suspected) exposure to COVID-19  Cough    Procedures  Value: [id number] Code: CPT-4 UNLISTED E&M SERVICE    Medications Prescribed    No prescriptions ordered    Electronically signed by: Robinson De La Cruz(NPI [id number])

## 2022-01-11 NOTE — PATIENT INSTRUCTIONS
At this point would recommend you go to an urgent care for a full evaluation as symptoms have been persistent for 2 weeks  You are now having some blood-tinged mucus that you stay is coming from her chest   Would recommend in-person evaluation to listen to her lungs checked her oxygen and possible chest x-ray  Verbalized understanding of these instructions

## 2022-01-11 NOTE — PATIENT INSTRUCTIONS
1  Increase her oral hydration  2  Observe the brat diet as discussed  3  Go to the ER for any worsening symptoms  4  Follow-up with your primary care doctor within 1 week  Pneumonia   WHAT YOU NEED TO KNOW:   Pneumonia is an infection in your lungs caused by bacteria, viruses, fungi, or parasites  You can become infected if you come in contact with someone who is sick  You can get pneumonia if you recently had surgery or needed a ventilator to help you breathe  Pneumonia can also be caused by accidentally inhaling saliva or small pieces of food  Pneumonia may cause mild symptoms, or it can be severe and life-threatening  DISCHARGE INSTRUCTIONS:   Return to the emergency department if:   · You cough up blood  · Your heart beats more than 100 beats in 1 minute  · You are very tired, confused, and cannot think clearly  · You have chest pain or trouble breathing  · Your lips or fingernails turn gray or blue  Call your doctor if:   · Your symptoms are the same or get worse 48 hours after you start antibiotics  · Your fever is not below 99°F (37 2°C) 48 hours after you start antibiotics  · You have a fever higher than 101°F (38 3°C)  · You cannot eat, or you have loss of appetite, nausea, or are vomiting  · You have questions or concerns about your condition or care  Medicines: You may need any of the following:  · Antibiotics  treat pneumonia caused by bacteria  · Acetaminophen  decreases pain and fever  It is available without a doctor's order  Ask how much to take and how often to take it  Follow directions  Read the labels of all other medicines you are using to see if they also contain acetaminophen, or ask your doctor or pharmacist  Acetaminophen can cause liver damage if not taken correctly  Do not use more than 4 grams (4,000 milligrams) total of acetaminophen in one day  · NSAIDs , such as ibuprofen, help decrease swelling, pain, and fever   This medicine is available with or without a doctor's order  NSAIDs can cause stomach bleeding or kidney problems in certain people  If you take blood thinner medicine, always ask your healthcare provider if NSAIDs are safe for you  Always read the medicine label and follow directions  · Take your medicine as directed  Contact your healthcare provider if you think your medicine is not helping or if you have side effects  Tell him or her if you are allergic to any medicine  Keep a list of the medicines, vitamins, and herbs you take  Include the amounts, and when and why you take them  Bring the list or the pill bottles to follow-up visits  Carry your medicine list with you in case of an emergency  Follow up with your doctor as directed: You will need to return for more tests  Write down your questions so you remember to ask them during your visits  Manage your symptoms:   · Rest as needed  Rest often throughout the day  Alternate times of activity with times of rest     · Drink liquids as directed  Ask how much liquid to drink each day and which liquids are best for you  Liquids help thin your mucus, which may make it easier for you to cough it up  · Do not smoke  Avoid secondhand smoke  Smoking increases your risk for pneumonia  Smoking also makes it harder for you to get better after you have had pneumonia  Ask your healthcare provider for information if you need help to quit smoking  · Limit alcohol  Women should limit alcohol to 1 drink a day  Men should limit alcohol to 2 drinks a day  A drink of alcohol is 12 ounces of beer, 5 ounces of wine, or 1½ ounces of liquor  · Use a cool mist humidifier  A humidifier will help increase air moisture in your home  This may make it easier for you to breathe and help decrease your cough  · Keep your head elevated  You may be able to breathe better if you lie down with the head of your bed up  Prevent pneumonia:   · Wash your hands often    Use soap and water every time you wash your hands  Rub your soapy hands together, lacing your fingers  Use the fingers of one hand to scrub under the nails of the other hand  Wash for at least 20 seconds  Rinse with warm, running water for several seconds  Then dry your hands with a clean towel or paper towel  Use hand  that contains alcohol if soap and water are not available  Do not touch your eyes, nose, or mouth without washing your hands first          · Cover a sneeze or cough  Use a tissue that covers your mouth and nose  Throw the tissue away in a trash can right away  Use the bend of your arm if a tissue is not available  Wash your hands well with soap and water or use a hand   Do not stand close to anyone who is sneezing or coughing  · Stay away from others until you are well  Do not go to work or other activities  Wait until your symptoms are gone or your healthcare provider says it is okay to return  · Ask about vaccines you may need  You may need a vaccine to help prevent pneumonia  Get an influenza (flu) vaccine every year as soon as recommended, usually in September or October  Flu viruses change, so it is important to get a yearly flu vaccine  © Copyright [employer] 2021 Information is for End User's use only and may not be sold, redistributed or otherwise used for commercial purposes  All illustrations and images included in CareNotes® are the copyrighted property of [employer]  or Steven Hudson  The above information is an  only  It is not intended as medical advice for individual conditions or treatments  Talk to your doctor, nurse or pharmacist before following any medical regimen to see if it is safe and effective for you

## 2022-01-12 LAB — SARS-COV-2 RNA RESP QL NAA+PROBE: POSITIVE

## 2022-01-13 ENCOUNTER — TELEPHONE (OUTPATIENT)
Dept: FAMILY MEDICINE CLINIC | Facility: CLINIC | Age: 27
End: 2022-01-13

## 2022-01-13 NOTE — TELEPHONE ENCOUNTER
It sounds like she has a lot of concerns that would be more appropriately addressed with an appointment  She can take ibuprofen for fever alternating with Tylenol  Benadryl can help with nausea  Please again encourage her to schedule a visit

## 2022-01-13 NOTE — TELEPHONE ENCOUNTER
Patient called back and feels that her fever is not breaking    she is taking tylenol but feels that its into helping     she is also vomiting and cannot keep nothing down    she feels she does not need a virtual appointment   Please advise

## 2022-01-13 NOTE — TELEPHONE ENCOUNTER
Patient has COVID it is normal to have multiple symptoms  Can take Tylenol for fever, mucinex for congestion  Recommend hydration, rest  Eating basic bland foods  Ginger ale or ginger tea for nausea  Please have patient schedule virtual visit if other questions

## 2022-01-13 NOTE — TELEPHONE ENCOUNTER
Patient was in urgent care on  01/11/22 and they only gave her a z pac but nothing for her multiple other symptoms  Patient would like to know if we can prescribe her something for nausea and to help break her fever as well as mucus  The only thing urgent care did was prescribe her Z pac   [address]

## 2022-01-14 ENCOUNTER — TELEPHONE (OUTPATIENT)
Dept: URGENT CARE | Facility: MEDICAL CENTER | Age: 27
End: 2022-01-14

## 2022-01-14 DIAGNOSIS — R11.0 NAUSEA: Primary | ICD-10-CM

## 2022-01-14 RX ORDER — ONDANSETRON 4 MG/1
4 TABLET, ORALLY DISINTEGRATING ORAL EVERY 6 HOURS PRN
Qty: 20 TABLET | Refills: 0 | Status: SHIPPED | OUTPATIENT
Start: 2022-01-14

## 2022-01-14 NOTE — TELEPHONE ENCOUNTER
Message left on patient's voicemail regarding lab results  Invited her to call back if she had any questions and if she has not been able to get her results from my chart

## 2022-03-23 ENCOUNTER — OFFICE VISIT (OUTPATIENT)
Dept: URGENT CARE | Facility: CLINIC | Age: 27
End: 2022-03-23
Payer: COMMERCIAL

## 2022-03-23 VITALS
OXYGEN SATURATION: 99 % | BODY MASS INDEX: 49.69 KG/M2 | RESPIRATION RATE: 18 BRPM | WEIGHT: 270 LBS | HEIGHT: 62 IN | HEART RATE: 125 BPM | TEMPERATURE: 98.4 F

## 2022-03-23 DIAGNOSIS — R50.9 FEVER, UNSPECIFIED FEVER CAUSE: ICD-10-CM

## 2022-03-23 DIAGNOSIS — H66.92 ACUTE LEFT OTITIS MEDIA: Primary | ICD-10-CM

## 2022-03-23 PROCEDURE — 99213 OFFICE O/P EST LOW 20 MIN: CPT | Performed by: PHYSICIAN ASSISTANT

## 2022-03-23 PROCEDURE — 87636 SARSCOV2 & INF A&B AMP PRB: CPT | Performed by: PHYSICIAN ASSISTANT

## 2022-03-23 RX ORDER — AMOXICILLIN AND CLAVULANATE POTASSIUM 875; 125 MG/1; MG/1
1 TABLET, FILM COATED ORAL EVERY 12 HOURS SCHEDULED
Qty: 14 TABLET | Refills: 0 | Status: SHIPPED | OUTPATIENT
Start: 2022-03-23 | End: 2022-03-30

## 2022-03-23 NOTE — PROGRESS NOTES
[facility]        NAME: Maria E Aviles is a 32 y o  female  : 1995    MRN: [id number]  DATE: 2022  TIME: 9:22 AM    Assessment and Plan   Acute left otitis media [H66 92]  1  Acute left otitis media  amoxicillin-clavulanate (AUGMENTIN) 875-125 mg per tablet         Patient Instructions   Patient Instructions     Ear Infection   WHAT YOU NEED TO KNOW:   An ear infection is also called otitis media  Blocked or swollen eustachian tubes can cause an infection  Eustachian tubes connect the middle ear to the back of the nose and throat  They drain fluid from the middle ear  You may have a buildup of fluid in your ear  Germs build up in the fluid and infection develops  DISCHARGE INSTRUCTIONS:   Return to the emergency department if:   · You have clear fluid coming from your ear  · You have a stiff neck, headache, and a fever  Call your doctor if:   · You see blood or pus draining from your ear  · Your ear pain gets worse or does not go away, even after treatment  · The outside of your ear is red or swollen  · You are vomiting or have diarrhea  · You have questions or concerns about your condition or care  Medicines: You may  need any of the following:  · Acetaminophen  decreases pain and fever  It is available without a doctor's order  Ask how much to take and how often to take it  Follow directions  Read the labels of all other medicines you are using to see if they also contain acetaminophen, or ask your doctor or pharmacist  Acetaminophen can cause liver damage if not taken correctly  Do not use more than 4 grams (4,000 milligrams) total of acetaminophen in one day  · NSAIDs , such as ibuprofen, help decrease swelling, pain, and fever  This medicine is available with or without a doctor's order  NSAIDs can cause stomach bleeding or kidney problems in certain people   If you take blood thinner medicine, always ask your healthcare provider if NSAIDs are safe for you  Always read the medicine label and follow directions  · Ear drops  may contain medicine to decrease pain and inflammation  · Antibiotics  help treat a bacterial infection  · Take your medicine as directed  Contact your healthcare provider if you think your medicine is not helping or if you have side effects  Tell him or her if you are allergic to any medicine  Keep a list of the medicines, vitamins, and herbs you take  Include the amounts, and when and why you take them  Bring the list or the pill bottles to follow-up visits  Carry your medicine list with you in case of an emergency  Self-care:   · Apply heat  on your ear for 15 to 20 minutes, 3 to 4 times a day or as directed  You can apply heat with an electric heating pad, hot water bottle, or warm compress  Always put a cloth between your skin and the heat pack to prevent burns  Heat helps decrease pain  · Apply ice  on your ear for 15 to 20 minutes, 3 to 4 times a day for 2 days or as directed  Use an ice pack, or put crushed ice in a plastic bag  Cover it with a towel before you apply it to your ear  Ice decreases swelling and pain  Prevent an ear infection:   · Wash your hands often  to help prevent the spread of germs  Ask everyone in your house to wash their hands with soap and water  Ask them to wash after they use the bathroom or change a diaper  Remind them to wash before they prepare or eat food  · Stay away from people who are ill  Some germs spread easily and quickly through contact  Follow up with your doctor as directed:  Write down your questions so you remember to ask them during your visits  © Copyright [employer] 2022 Information is for End User's use only and may not be sold, redistributed or otherwise used for commercial purposes  All illustrations and images included in CareNotes® are the copyrighted property of A D A M , Inc  or [facility] Aracelis Puentes   The above information is an  only  It is not intended as medical advice for individual conditions or treatments  Talk to your doctor, nurse or pharmacist before following any medical regimen to see if it is safe and effective for you  Follow up with PCP in 3-5 days  Proceed to  ER if symptoms worsen  Chief Complaint     Chief Complaint   Patient presents with    Earache     started 1 week ago     Cough    Fever         History of Present Illness       Patient presents with 1 week of productive cough, sneezing, low grade fevers  Last night developed left sided ear pain  Patients  sick with the same thing  Denies chest pains, SOB, dyspnea, palpitations,  Loss of taste/smell  Tried OTC medications without much relief  Had covid 1 5 months ago  No exposure to the flu  Review of Systems   Review of Systems   Constitutional: Positive for fever  Negative for chills and fatigue  HENT: Positive for congestion, ear pain, rhinorrhea and sneezing  Negative for ear discharge, postnasal drip, sinus pressure, sinus pain and sore throat  Respiratory: Positive for cough  Negative for chest tightness, shortness of breath and wheezing  Cardiovascular: Negative for chest pain and palpitations  Musculoskeletal: Negative for arthralgias and myalgias  Neurological: Negative for weakness  Psychiatric/Behavioral: Negative for confusion           Current Medications       Current Outpatient Medications:     albuterol (ProAir HFA) 90 mcg/act inhaler, Inhale 2 puffs every 4 (four) hours as needed for wheezing or shortness of breath, Disp: 8 5 g, Rfl: 0    amoxicillin-clavulanate (AUGMENTIN) 875-125 mg per tablet, Take 1 tablet by mouth every 12 (twelve) hours for 7 days, Disp: 14 tablet, Rfl: 0    atorvastatin (LIPITOR) 40 mg tablet, Take 1 tablet (40 mg total) by mouth daily at bedtime, Disp: 90 tablet, Rfl: 4    benztropine (COGENTIN) 0 5 mg tablet, Take 1 tablet (0 5 mg total) by mouth daily at bedtime as needed for tremors (or jerking), Disp: 30 tablet, Rfl: 2    clonazePAM (KlonoPIN) 0 5 mg tablet, Take 0 5 tablets (0 25 mg total) by mouth 2 (two) times a day as needed for anxiety, Disp: 30 tablet, Rfl: 2    lamoTRIgine (LaMICtal) 100 mg tablet, TAKE ONE TABLET BY MOUTH EVERY 24 HRS, Disp: , Rfl:     ondansetron (Zofran ODT) 4 mg disintegrating tablet, Take 1 tablet (4 mg total) by mouth every 6 (six) hours as needed for nausea or vomiting, Disp: 20 tablet, Rfl: 0    PARoxetine (PAXIL) 40 MG tablet, Take 1 tablet (40 mg total) by mouth daily, Disp: 30 tablet, Rfl: 2    QUEtiapine (SEROquel) 100 mg tablet, Take 1/2 tab po qAM and 1 and 1/2 tabs po qhs, Disp: 75 tablet, Rfl: 2    Current Allergies     Allergies as of 03/23/2022    (No Known Allergies)            The following portions of the patient's history were reviewed and updated as appropriate: allergies, current medications, past family history, past medical history, past social history, past surgical history and problem list      Past Medical History:   Diagnosis Date    Bipolar disorder ([facility] [address] )     Chronic kidney disease     Depression     Fatty liver     Hyperlipidemia 1/13/2020    Self-injurious behavior     [facility] )     2016       Past Surgical History:   Procedure Laterality Date    NO PAST SURGERIES         Family History   Problem Relation Age of Onset    Cervical cancer Mother     Heart attack Mother     Anxiety disorder Mother     Depression Mother     Diverticulitis Mother     Heart attack Father     Hypertension Father     Hyperlipidemia Father     No Known Problems Brother     Dementia Maternal Grandmother     Alzheimer's disease Maternal Grandmother     Throat cancer Paternal Grandfather     Schizophrenia Maternal Aunt          Medications have been verified          Objective   Pulse (!) 125   Temp 98 4 °F (36 9 °C) (Temporal)   Resp 18   Ht 5' 2" (1 575 m)   Wt 122 kg (270 lb)   SpO2 99%   BMI 49 38 kg/m² Physical Exam     Physical Exam  Constitutional:       General: She is not in acute distress  Appearance: Normal appearance  She is not ill-appearing or diaphoretic  HENT:      Right Ear: Tympanic membrane, ear canal and external ear normal       Left Ear: Ear canal and external ear normal       Ears:      Comments: Left TM erythematous     Nose: Nose normal       Mouth/Throat:      Mouth: Mucous membranes are moist       Pharynx: Oropharynx is clear  Comments: Left-sided tonsillith noted otherwise within normal limits  Eyes:      Conjunctiva/sclera: Conjunctivae normal    Cardiovascular:      Rate and Rhythm: Normal rate and regular rhythm  Heart sounds: Normal heart sounds  Pulmonary:      Effort: Pulmonary effort is normal       Breath sounds: Normal breath sounds  Skin:     General: Skin is warm and dry  Neurological:      Mental Status: She is alert     Psychiatric:         Mood and Affect: Mood normal          Behavior: Behavior normal

## 2022-03-23 NOTE — PATIENT INSTRUCTIONS
Ear Infection   WHAT YOU NEED TO KNOW:   An ear infection is also called otitis media  Blocked or swollen eustachian tubes can cause an infection  Eustachian tubes connect the middle ear to the back of the nose and throat  They drain fluid from the middle ear  You may have a buildup of fluid in your ear  Germs build up in the fluid and infection develops  DISCHARGE INSTRUCTIONS:   Return to the emergency department if:   · You have clear fluid coming from your ear  · You have a stiff neck, headache, and a fever  Call your doctor if:   · You see blood or pus draining from your ear  · Your ear pain gets worse or does not go away, even after treatment  · The outside of your ear is red or swollen  · You are vomiting or have diarrhea  · You have questions or concerns about your condition or care  Medicines: You may  need any of the following:  · Acetaminophen  decreases pain and fever  It is available without a doctor's order  Ask how much to take and how often to take it  Follow directions  Read the labels of all other medicines you are using to see if they also contain acetaminophen, or ask your doctor or pharmacist  Acetaminophen can cause liver damage if not taken correctly  Do not use more than 4 grams (4,000 milligrams) total of acetaminophen in one day  · NSAIDs , such as ibuprofen, help decrease swelling, pain, and fever  This medicine is available with or without a doctor's order  NSAIDs can cause stomach bleeding or kidney problems in certain people  If you take blood thinner medicine, always ask your healthcare provider if NSAIDs are safe for you  Always read the medicine label and follow directions  · Ear drops  may contain medicine to decrease pain and inflammation  · Antibiotics  help treat a bacterial infection  · Take your medicine as directed  Contact your healthcare provider if you think your medicine is not helping or if you have side effects   Tell him or her if you are allergic to any medicine  Keep a list of the medicines, vitamins, and herbs you take  Include the amounts, and when and why you take them  Bring the list or the pill bottles to follow-up visits  Carry your medicine list with you in case of an emergency  Self-care:   · Apply heat  on your ear for 15 to 20 minutes, 3 to 4 times a day or as directed  You can apply heat with an electric heating pad, hot water bottle, or warm compress  Always put a cloth between your skin and the heat pack to prevent burns  Heat helps decrease pain  · Apply ice  on your ear for 15 to 20 minutes, 3 to 4 times a day for 2 days or as directed  Use an ice pack, or put crushed ice in a plastic bag  Cover it with a towel before you apply it to your ear  Ice decreases swelling and pain  Prevent an ear infection:   · Wash your hands often  to help prevent the spread of germs  Ask everyone in your house to wash their hands with soap and water  Ask them to wash after they use the bathroom or change a diaper  Remind them to wash before they prepare or eat food  · Stay away from people who are ill  Some germs spread easily and quickly through contact  Follow up with your doctor as directed:  Write down your questions so you remember to ask them during your visits  © Copyright [employer] 2022 Information is for End User's use only and may not be sold, redistributed or otherwise used for commercial purposes  All illustrations and images included in CareNotes® are the copyrighted property of A D A M , Inc  or Steven Hudson  The above information is an  only  It is not intended as medical advice for individual conditions or treatments  Talk to your doctor, nurse or pharmacist before following any medical regimen to see if it is safe and effective for you

## 2022-03-24 LAB
FLUAV RNA RESP QL NAA+PROBE: NEGATIVE
FLUBV RNA RESP QL NAA+PROBE: NEGATIVE
SARS-COV-2 RNA RESP QL NAA+PROBE: NEGATIVE

## 2023-05-08 NOTE — ASSESSMENT & PLAN NOTE
Now is having symptoms and is enlarging in size, requesting referral, provided for hand surgery What Type Of Note Output Would You Prefer (Optional)?: Standard Output Hpi Title: Evaluation of Skin Lesions How Severe Are Your Spot(S)?: mild Have Your Spot(S) Been Treated In The Past?: has not been treated

## 2023-10-30 ENCOUNTER — HOSPITAL ENCOUNTER (EMERGENCY)
Facility: HOSPITAL | Age: 28
Discharge: HOME | End: 2023-10-30
Attending: EMERGENCY MEDICINE
Payer: COMMERCIAL

## 2023-10-30 ENCOUNTER — OFFICE VISIT (OUTPATIENT)
Dept: URGENT CARE | Facility: MEDICAL CENTER | Age: 28
End: 2023-10-30
Payer: COMMERCIAL

## 2023-10-30 ENCOUNTER — APPOINTMENT (EMERGENCY)
Dept: CT IMAGING | Facility: HOSPITAL | Age: 28
End: 2023-10-30
Payer: COMMERCIAL

## 2023-10-30 ENCOUNTER — APPOINTMENT (EMERGENCY)
Dept: RADIOLOGY | Facility: HOSPITAL | Age: 28
End: 2023-10-30
Payer: COMMERCIAL

## 2023-10-30 VITALS
HEART RATE: 116 BPM | DIASTOLIC BLOOD PRESSURE: 88 MMHG | BODY MASS INDEX: 53.92 KG/M2 | RESPIRATION RATE: 22 BRPM | TEMPERATURE: 98.1 F | HEIGHT: 62 IN | SYSTOLIC BLOOD PRESSURE: 132 MMHG | WEIGHT: 293 LBS | OXYGEN SATURATION: 93 %

## 2023-10-30 VITALS
TEMPERATURE: 99 F | HEART RATE: 111 BPM | SYSTOLIC BLOOD PRESSURE: 119 MMHG | RESPIRATION RATE: 25 BRPM | DIASTOLIC BLOOD PRESSURE: 66 MMHG | OXYGEN SATURATION: 94 %

## 2023-10-30 DIAGNOSIS — J45.901 ASTHMA EXACERBATION: Primary | ICD-10-CM

## 2023-10-30 DIAGNOSIS — R06.02 SHORTNESS OF BREATH: Primary | ICD-10-CM

## 2023-10-30 DIAGNOSIS — R05.1 ACUTE COUGH: ICD-10-CM

## 2023-10-30 DIAGNOSIS — R00.0 TACHYCARDIA: ICD-10-CM

## 2023-10-30 LAB
ALBUMIN SERPL BCP-MCNC: 4 G/DL (ref 3.5–5)
ALP SERPL-CCNC: 115 U/L (ref 34–104)
ALT SERPL W P-5'-P-CCNC: 30 U/L (ref 7–52)
ANION GAP SERPL CALCULATED.3IONS-SCNC: 10 MMOL/L
AST SERPL W P-5'-P-CCNC: 25 U/L (ref 13–39)
BASOPHILS # BLD AUTO: 0.05 THOUSANDS/ÂΜL (ref 0–0.1)
BASOPHILS NFR BLD AUTO: 0 % (ref 0–1)
BILIRUB SERPL-MCNC: 0.42 MG/DL (ref 0.2–1)
BNP SERPL-MCNC: 29 PG/ML (ref 0–100)
BUN SERPL-MCNC: 8 MG/DL (ref 5–25)
CALCIUM SERPL-MCNC: 9.3 MG/DL (ref 8.4–10.2)
CARDIAC TROPONIN I PNL SERPL HS: <2 NG/L
CARDIAC TROPONIN I PNL SERPL HS: <2 NG/L
CHLORIDE SERPL-SCNC: 100 MMOL/L (ref 96–108)
CO2 SERPL-SCNC: 27 MMOL/L (ref 21–32)
CREAT SERPL-MCNC: 0.64 MG/DL (ref 0.6–1.3)
D DIMER PPP FEU-MCNC: 0.53 UG/ML FEU
EOSINOPHIL # BLD AUTO: 0.41 THOUSAND/ÂΜL (ref 0–0.61)
EOSINOPHIL NFR BLD AUTO: 3 % (ref 0–6)
ERYTHROCYTE [DISTWIDTH] IN BLOOD BY AUTOMATED COUNT: 14.1 % (ref 11.6–15.1)
FLUAV RNA RESP QL NAA+PROBE: NEGATIVE
FLUBV RNA RESP QL NAA+PROBE: NEGATIVE
GFR SERPL CREATININE-BSD FRML MDRD: 121 ML/MIN/1.73SQ M
GLUCOSE SERPL-MCNC: 118 MG/DL (ref 65–140)
HCG SERPL QL: NEGATIVE
HCT VFR BLD AUTO: 41.7 % (ref 34.8–46.1)
HGB BLD-MCNC: 13.5 G/DL (ref 11.5–15.4)
IMM GRANULOCYTES # BLD AUTO: 0.21 THOUSAND/UL (ref 0–0.2)
IMM GRANULOCYTES NFR BLD AUTO: 2 % (ref 0–2)
LYMPHOCYTES # BLD AUTO: 2.69 THOUSANDS/ÂΜL (ref 0.6–4.47)
LYMPHOCYTES NFR BLD AUTO: 19 % (ref 14–44)
MCH RBC QN AUTO: 27.8 PG (ref 26.8–34.3)
MCHC RBC AUTO-ENTMCNC: 32.4 G/DL (ref 31.4–37.4)
MCV RBC AUTO: 86 FL (ref 82–98)
MONOCYTES # BLD AUTO: 0.53 THOUSAND/ÂΜL (ref 0.17–1.22)
MONOCYTES NFR BLD AUTO: 4 % (ref 4–12)
NEUTROPHILS # BLD AUTO: 10.37 THOUSANDS/ÂΜL (ref 1.85–7.62)
NEUTS SEG NFR BLD AUTO: 72 % (ref 43–75)
NRBC BLD AUTO-RTO: 0 /100 WBCS
PLATELET # BLD AUTO: 539 THOUSANDS/UL (ref 149–390)
PMV BLD AUTO: 8.8 FL (ref 8.9–12.7)
POTASSIUM SERPL-SCNC: 3.9 MMOL/L (ref 3.5–5.3)
PROT SERPL-MCNC: 7.8 G/DL (ref 6.4–8.4)
RBC # BLD AUTO: 4.85 MILLION/UL (ref 3.81–5.12)
RSV RNA RESP QL NAA+PROBE: NEGATIVE
SARS-COV-2 RNA RESP QL NAA+PROBE: NEGATIVE
SODIUM SERPL-SCNC: 137 MMOL/L (ref 135–147)
TSH SERPL DL<=0.05 MIU/L-ACNC: 1.78 UIU/ML (ref 0.45–4.5)
WBC # BLD AUTO: 14.26 THOUSAND/UL (ref 4.31–10.16)

## 2023-10-30 PROCEDURE — 80053 COMPREHEN METABOLIC PANEL: CPT | Performed by: EMERGENCY MEDICINE

## 2023-10-30 PROCEDURE — 96360 HYDRATION IV INFUSION INIT: CPT

## 2023-10-30 PROCEDURE — 96361 HYDRATE IV INFUSION ADD-ON: CPT

## 2023-10-30 PROCEDURE — G1004 CDSM NDSC: HCPCS

## 2023-10-30 PROCEDURE — 99285 EMERGENCY DEPT VISIT HI MDM: CPT

## 2023-10-30 PROCEDURE — 84703 CHORIONIC GONADOTROPIN ASSAY: CPT | Performed by: EMERGENCY MEDICINE

## 2023-10-30 PROCEDURE — 85025 COMPLETE CBC W/AUTO DIFF WBC: CPT | Performed by: EMERGENCY MEDICINE

## 2023-10-30 PROCEDURE — 0241U HB NFCT DS VIR RESP RNA 4 TRGT: CPT | Performed by: EMERGENCY MEDICINE

## 2023-10-30 PROCEDURE — 93005 ELECTROCARDIOGRAM TRACING: CPT

## 2023-10-30 PROCEDURE — 84484 ASSAY OF TROPONIN QUANT: CPT | Performed by: EMERGENCY MEDICINE

## 2023-10-30 PROCEDURE — 85379 FIBRIN DEGRADATION QUANT: CPT | Performed by: EMERGENCY MEDICINE

## 2023-10-30 PROCEDURE — 94640 AIRWAY INHALATION TREATMENT: CPT

## 2023-10-30 PROCEDURE — 99285 EMERGENCY DEPT VISIT HI MDM: CPT | Performed by: EMERGENCY MEDICINE

## 2023-10-30 PROCEDURE — 84443 ASSAY THYROID STIM HORMONE: CPT | Performed by: EMERGENCY MEDICINE

## 2023-10-30 PROCEDURE — 71046 X-RAY EXAM CHEST 2 VIEWS: CPT

## 2023-10-30 PROCEDURE — 36415 COLL VENOUS BLD VENIPUNCTURE: CPT | Performed by: EMERGENCY MEDICINE

## 2023-10-30 PROCEDURE — 83880 ASSAY OF NATRIURETIC PEPTIDE: CPT | Performed by: EMERGENCY MEDICINE

## 2023-10-30 PROCEDURE — 99213 OFFICE O/P EST LOW 20 MIN: CPT | Performed by: PHYSICIAN ASSISTANT

## 2023-10-30 PROCEDURE — 71275 CT ANGIOGRAPHY CHEST: CPT

## 2023-10-30 RX ORDER — PREDNISONE 20 MG/1
60 TABLET ORAL ONCE
Status: COMPLETED | OUTPATIENT
Start: 2023-10-30 | End: 2023-10-30

## 2023-10-30 RX ORDER — ALBUTEROL SULFATE 2.5 MG/3ML
5 SOLUTION RESPIRATORY (INHALATION) ONCE
Status: COMPLETED | OUTPATIENT
Start: 2023-10-30 | End: 2023-10-30

## 2023-10-30 RX ORDER — PREDNISONE 20 MG/1
60 TABLET ORAL DAILY
Qty: 12 TABLET | Refills: 0 | Status: SHIPPED | OUTPATIENT
Start: 2023-10-30 | End: 2023-11-03

## 2023-10-30 RX ORDER — SODIUM CHLORIDE 9 MG/ML
3 INJECTION INTRAVENOUS
Status: DISCONTINUED | OUTPATIENT
Start: 2023-10-30 | End: 2023-10-30 | Stop reason: HOSPADM

## 2023-10-30 RX ADMIN — IPRATROPIUM BROMIDE 0.5 MG: 0.5 SOLUTION RESPIRATORY (INHALATION) at 12:52

## 2023-10-30 RX ADMIN — IOHEXOL 100 ML: 350 INJECTION, SOLUTION INTRAVENOUS at 14:33

## 2023-10-30 RX ADMIN — PREDNISONE 60 MG: 20 TABLET ORAL at 12:52

## 2023-10-30 RX ADMIN — ALBUTEROL SULFATE 5 MG: 2.5 SOLUTION RESPIRATORY (INHALATION) at 12:52

## 2023-10-30 RX ADMIN — SODIUM CHLORIDE 1000 ML: 0.9 INJECTION, SOLUTION INTRAVENOUS at 12:59

## 2023-10-30 NOTE — PATIENT INSTRUCTIONS
Patient declined ambulance transfer, she would like to go via private vehicle to the [facility] Emergency Department for further evaluation, workup and treatment- hospital address verified with the patient. Patient agreed to go immediately to the ED.

## 2023-10-30 NOTE — PROGRESS NOTES
[facility]        NAME: Noah Mendieta is a 29 y.o. female  : 1995    MRN: [id number]  DATE: 2023  TIME: 10:49 AM    Assessment and Plan   Shortness of breath [R06.02]  1. Shortness of breath  Transfer to other facility      2. Tachycardia  Transfer to other facility      3. Acute cough  Transfer to other facility        Shortness of breath worsening over the past few days. 4-5 word dyspnea. Tachycardia, oxygen between 92-94% on RA while talking. Cough x 1 week worsening- states saw some occasional blood tinged mucous production. Denies hx of blood clots. States hx of pneumonias. Explained the importance of taking an ambulance. Patient declined transport by ambulance, states can drive self. Alert, oriented, capable of making her own medical decisions and understands the risks of refusing ambulance transfer. She does agree to go to the ED for further evaluation and treatment. Patient Instructions     Patient declined ambulance transfer, she would like to go via private vehicle to the [facility] Emergency Department for further evaluation, workup and treatment- hospital address verified with the patient. Patient agreed to go immediately to the ED. Chief Complaint     Chief Complaint   Patient presents with    Shortness of Breath     Cough, congestion ongoing x1 week; states she is short of breath worse with exertion and while laying flat; covid tested at home which was negative    Hx of bronchitis/pneumonia          History of Present Illness       27-year-old female presents for evaluation of worsening shortness of breath over the past few days. States he has had a cough x1 week. States is productive-notes occasional blood-tinged sputum. No she is not having fevers and chills the past few days. Is been trying multiple over-the-counter cough/cold medications with little improvement.   She denies any runny nose, nasal congestion, sore throat or other cold-like symptoms. Notes some occasional chest tightness but she denies any chest pain or palpitations. No she did have a sick contact with similar symptoms but states he is already feeling better and she seems to be worsening. States took a COVID test at home that was negative. She denies any recent travel. Denies any history of blood clots. She denies any pregnancy risk. Denies any nausea, vomiting, abdominal pain, urinary symptoms or other complaints. Review of Systems   Review of Systems   Constitutional:  Positive for chills and fever. Negative for activity change, appetite change and fatigue. HENT:  Negative for congestion, ear pain, facial swelling, postnasal drip, rhinorrhea, sinus pressure, sore throat, trouble swallowing and voice change. Eyes:  Negative for discharge, itching and visual disturbance. Respiratory:  Positive for cough, chest tightness and shortness of breath. Negative for wheezing. Cardiovascular:  Negative for chest pain. Gastrointestinal:  Negative for abdominal pain, diarrhea, nausea and vomiting. Musculoskeletal:  Negative for back pain and neck pain. Skin:  Negative for rash. Neurological:  Negative for dizziness, syncope, weakness, numbness and headaches. All other systems reviewed and are negative.         Current Medications       Current Outpatient Medications:     albuterol (ProAir HFA) 90 mcg/act inhaler, Inhale 2 puffs every 4 (four) hours as needed for wheezing or shortness of breath, Disp: 8.5 g, Rfl: 0    atorvastatin (LIPITOR) 40 mg tablet, Take 1 tablet (40 mg total) by mouth daily at bedtime, Disp: 90 tablet, Rfl: 4    benztropine (COGENTIN) 0.5 mg tablet, Take 1 tablet (0.5 mg total) by mouth daily at bedtime as needed for tremors (or jerking), Disp: 30 tablet, Rfl: 2    clonazePAM (KlonoPIN) 0.5 mg tablet, Take 0.5 tablets (0.25 mg total) by mouth 2 (two) times a day as needed for anxiety, Disp: 30 tablet, Rfl: 2    lamoTRIgine (LaMICtal) 100 mg tablet, TAKE ONE TABLET BY MOUTH EVERY 24 HRS, Disp: , Rfl:     ondansetron (Zofran ODT) 4 mg disintegrating tablet, Take 1 tablet (4 mg total) by mouth every 6 (six) hours as needed for nausea or vomiting, Disp: 20 tablet, Rfl: 0    PARoxetine (PAXIL) 40 MG tablet, Take 1 tablet (40 mg total) by mouth daily, Disp: 30 tablet, Rfl: 2    QUEtiapine (SEROquel) 100 mg tablet, Take 1/2 tab po qAM and 1 and 1/2 tabs po qhs, Disp: 75 tablet, Rfl: 2    Current Allergies     Allergies as of 10/30/2023    (No Known Allergies)            The following portions of the patient's history were reviewed and updated as appropriate: allergies, current medications, past family history, past medical history, past social history, past surgical history and problem list.     Past Medical History:   Diagnosis Date    Bipolar disorder ([address] [facility])     Chronic kidney disease     Depression     Fatty liver     Hyperlipidemia 1/13/2020    Self-injurious behavior     Suicide attempt ([address] [facility])     2016       Past Surgical History:   Procedure Laterality Date    NO PAST SURGERIES         Family History   Problem Relation Age of Onset    Cervical cancer Mother     Heart attack Mother     Anxiety disorder Mother     Depression Mother     Diverticulitis Mother     Heart attack Father     Hypertension Father     Hyperlipidemia Father     No Known Problems Brother     Dementia Maternal Grandmother     Alzheimer's disease Maternal Grandmother     Throat cancer Paternal Grandfather     Schizophrenia Maternal Aunt          Medications have been verified. Objective   /88   Pulse (!) 116   Temp 98.1 °F (36.7 °C) (Temporal)   Resp 22   Ht 5' 2" (1.575 m)   Wt 136 kg (300 lb)   SpO2 93%   BMI 54.87 kg/m²        Physical Exam     Physical Exam  Vitals and nursing note reviewed. Constitutional:       General: She is not in acute distress. Appearance: Normal appearance. She is not ill-appearing or toxic-appearing.    HENT:      Head: Normocephalic and atraumatic. Right Ear: Tympanic membrane normal.      Left Ear: Tympanic membrane normal.      Mouth/Throat:      Mouth: Mucous membranes are moist.      Pharynx: Uvula midline. No oropharyngeal exudate, posterior oropharyngeal erythema or uvula swelling. Eyes:      Pupils: Pupils are equal, round, and reactive to light. Cardiovascular:      Rate and Rhythm: Regular rhythm. Tachycardia present. Heart sounds: Normal heart sounds. Pulmonary:      Effort: Pulmonary effort is normal. No respiratory distress. Breath sounds: Decreased breath sounds present. No wheezing. Comments: About 4-5 word dyspnea, coughing heard on exam  Skin:     Capillary Refill: Capillary refill takes less than 2 seconds. Neurological:      Mental Status: She is alert and oriented to person, place, and time.    Psychiatric:         Mood and Affect: Mood normal.         Behavior: Behavior normal.

## 2023-10-30 NOTE — ED PROVIDER NOTES
History  Chief Complaint   Patient presents with    Shortness of Breath     Pt reports fevers, body aches, coughing up mucous for one week. Reports chest pain, soreness in nature, pain with deep inspiration and cough     30 y/o female presents to the ED for flu like symptoms x 1 week. Patient has had cough, body aches, congestion. She states that she has a hx of pneumonia in the past and states that her symptoms feel similar. She denies any fever, cp, sob, abd pain, n/v, d/c, or urinary symptoms. She states that she has a hx of asthma and has been using her inhaler without relief. Denies any known sick contacts. No other complaints. History provided by:  Patient  Flu Symptoms  Presenting symptoms: cough and myalgias    Presenting symptoms: no diarrhea, no fever, no headaches, no nausea, no shortness of breath, no sore throat and no vomiting    Severity:  Moderate  Onset quality:  Sudden  Duration:  1 week  Progression:  Worsening  Chronicity:  New  Relieved by:  None tried  Worsened by:  Nothing  Ineffective treatments:  None tried  Associated symptoms: nasal congestion    Associated symptoms: no chills, no ear pain and no neck stiffness    Risk factors: no sick contacts        Prior to Admission Medications   Prescriptions Last Dose Informant Patient Reported? Taking?    PARoxetine (PAXIL) 40 MG tablet   No No   Sig: Take 1 tablet (40 mg total) by mouth daily   QUEtiapine (SEROquel) 100 mg tablet   No No   Sig: Take 1/2 tab po qAM and 1 and 1/2 tabs po qhs   albuterol (ProAir HFA) 90 mcg/act inhaler   No No   Sig: Inhale 2 puffs every 4 (four) hours as needed for wheezing or shortness of breath   atorvastatin (LIPITOR) 40 mg tablet   No No   Sig: Take 1 tablet (40 mg total) by mouth daily at bedtime   benztropine (COGENTIN) 0.5 mg tablet   No No   Sig: Take 1 tablet (0.5 mg total) by mouth daily at bedtime as needed for tremors (or jerking)   clonazePAM (KlonoPIN) 0.5 mg tablet   No No   Sig: Take 0.5 tablets (0.25 mg total) by mouth 2 (two) times a day as needed for anxiety   lamoTRIgine (LaMICtal) 100 mg tablet   Yes No   Sig: TAKE ONE TABLET BY MOUTH EVERY 24 HRS   ondansetron (Zofran ODT) 4 mg disintegrating tablet   No No   Sig: Take 1 tablet (4 mg total) by mouth every 6 (six) hours as needed for nausea or vomiting      Facility-Administered Medications: None       Past Medical History:   Diagnosis Date    Bipolar disorder ([facility])     Chronic kidney disease     Depression     Fatty liver     Hyperlipidemia 1/13/2020    Self-injurious behavior     Suicide attempt ([facility])     2016       Past Surgical History:   Procedure Laterality Date    NO PAST SURGERIES         Family History   Problem Relation Age of Onset    Cervical cancer Mother     Heart attack Mother     Anxiety disorder Mother     Depression Mother     Diverticulitis Mother     Heart attack Father     Hypertension Father     Hyperlipidemia Father     No Known Problems Brother     Dementia Maternal Grandmother     Alzheimer's disease Maternal Grandmother     Throat cancer Paternal Grandfather     Schizophrenia Maternal Aunt      I have reviewed and agree with the history as documented. E-Cigarette/Vaping    E-Cigarette Use Never User      E-Cigarette/Vaping Substances    Nicotine No     THC No     CBD No     Flavoring No     Other No     Unknown No      Social History     Tobacco Use    Smoking status: Never    Smokeless tobacco: Never   Vaping Use    Vaping Use: Never used   Substance Use Topics    Alcohol use: Yes     Comment: 1-2 mixed drinks once monthly or less    Drug use: Never       Review of Systems   Constitutional:  Negative for chills and fever. HENT:  Positive for congestion. Negative for ear pain and sore throat. Eyes:  Negative for pain and visual disturbance. Respiratory:  Positive for cough. Negative for shortness of breath and wheezing. Cardiovascular:  Negative for chest pain and leg swelling.    Gastrointestinal:  Negative for abdominal pain, diarrhea, nausea and vomiting. Genitourinary:  Negative for dysuria, frequency, hematuria and urgency. Musculoskeletal:  Positive for myalgias. Negative for neck pain and neck stiffness. Skin:  Negative for rash and wound. Neurological:  Negative for weakness, numbness and headaches. Psychiatric/Behavioral:  Negative for agitation and confusion. All other systems reviewed and are negative. Physical Exam  Physical Exam  Vitals and nursing note reviewed. Constitutional:       Appearance: She is well-developed. HENT:      Head: Normocephalic and atraumatic. Eyes:      Pupils: Pupils are equal, round, and reactive to light. Cardiovascular:      Rate and Rhythm: Regular rhythm. Tachycardia present. Pulmonary:      Effort: Pulmonary effort is normal.      Breath sounds: Normal breath sounds. Abdominal:      General: Bowel sounds are normal.      Palpations: Abdomen is soft. Musculoskeletal:         General: Normal range of motion. Cervical back: Normal range of motion and neck supple. Skin:     General: Skin is warm and dry. Neurological:      General: No focal deficit present. Mental Status: She is alert and oriented to person, place, and time.       Comments: No focal deficits         Vital Signs  ED Triage Vitals   Temperature Pulse Respirations Blood Pressure SpO2   10/30/23 1137 10/30/23 1137 10/30/23 1137 10/30/23 1137 10/30/23 1137   99.6 °F (37.6 °C) (!) 118 22 142/75 95 %      Temp Source Heart Rate Source Patient Position - Orthostatic VS BP Location FiO2 (%)   10/30/23 1137 10/30/23 1137 10/30/23 1137 10/30/23 1137 --   Temporal Monitor Sitting Left arm       Pain Score       10/30/23 1600       No Pain           Vitals:    10/30/23 1137 10/30/23 1500 10/30/23 1530 10/30/23 1600   BP: 142/75 129/74  119/66   Pulse: (!) 118 (!) 121 (!) 111 (!) 111   Patient Position - Orthostatic VS: Sitting Sitting  Sitting         Visual Acuity      ED Medications  Medications   sodium chloride (PF) 0.9 % injection 3 mL (has no administration in time range)   sodium chloride 0.9 % bolus 1,000 mL (0 mL Intravenous Stopped 10/30/23 1610)   albuterol inhalation solution 5 mg (5 mg Nebulization Given 10/30/23 1252)   ipratropium (ATROVENT) 0.02 % inhalation solution 0.5 mg (0.5 mg Nebulization Given 10/30/23 1252)   predniSONE tablet 60 mg (60 mg Oral Given 10/30/23 1252)   iohexol (OMNIPAQUE) 350 MG/ML injection (MULTI-DOSE) 100 mL (100 mL Intravenous Given 10/30/23 1433)       Diagnostic Studies  Results Reviewed       Procedure Component Value Units Date/Time    HS Troponin I 2hr [[id number]] Collected: 10/30/23 1502    Lab Status: Final result Specimen: Blood from Arm, Left Updated: 10/30/23 1552     hs TnI 2hr <2 ng/L      Delta 2hr hsTnI --    hCG, qualitative pregnancy [[id number]]  (Normal) Collected: 10/30/23 1253    Lab Status: Final result Specimen: Blood from Arm, Left Updated: 10/30/23 1515     Preg, Serum Negative    HS Troponin I 4hr [[id number]]     Lab Status: No result Specimen: Blood     POCT pregnancy, urine [[id number]]     Lab Status: No result     POCT urinalysis dipstick [[id number]]     Lab Status: No result Specimen: Urine     TSH, 3rd generation with Free T4 reflex [[id number]]  (Normal) Collected: 10/30/23 1253    Lab Status: Final result Specimen: Blood from Arm, Left Updated: 10/30/23 1409     TSH 3RD GENERATON 1.780 uIU/mL     B-Type Natriuretic Peptide(BNP) [[id number]]  (Normal) Collected: 10/30/23 1253    Lab Status: Final result Specimen: Blood from Arm, Left Updated: 10/30/23 1345     BNP 29 pg/mL     HS Troponin 0hr (reflex protocol) [[id number]]  (Normal) Collected: 10/30/23 1253    Lab Status: Final result Specimen: Blood from Arm, Left Updated: 10/30/23 1326     hs TnI 0hr <2 ng/L     Comprehensive metabolic panel [[id number]]  (Abnormal) Collected: 10/30/23 1253    Lab Status: Final result Specimen: Blood from Arm, Left Updated: 10/30/23 1318     Sodium 137 mmol/L      Potassium 3.9 mmol/L      Chloride 100 mmol/L      CO2 27 mmol/L      ANION GAP 10 mmol/L      BUN 8 mg/dL      Creatinine 0.64 mg/dL      Glucose 118 mg/dL      Calcium 9.3 mg/dL      AST 25 U/L      ALT 30 U/L      Alkaline Phosphatase 115 U/L      Total Protein 7.8 g/dL      Albumin 4.0 g/dL      Total Bilirubin 0.42 mg/dL      eGFR 121 ml/min/1.73sq m     Narrative:      National Kidney Disease Foundation guidelines for Chronic Kidney Disease (CKD):     Stage 1 with normal or high GFR (GFR > 90 mL/min/1.73 square meters)    Stage 2 Mild CKD (GFR = 60-89 mL/min/1.73 square meters)    Stage 3A Moderate CKD (GFR = 45-59 mL/min/1.73 square meters)    Stage 3B Moderate CKD (GFR = 30-44 mL/min/1.73 square meters)    Stage 4 Severe CKD (GFR = 15-29 mL/min/1.73 square meters)    Stage 5 End Stage CKD (GFR <15 mL/min/1.73 square meters)  Note: GFR calculation is accurate only with a steady state creatinine    D-dimer, quantitative [[id number]]  (Abnormal) Collected: 10/30/23 1253    Lab Status: Final result Specimen: Blood from Arm, Left Updated: 10/30/23 1317     D-Dimer, Quant 0.53 ug/ml FEU     CBC and differential [[id number]]  (Abnormal) Collected: 10/30/23 1253    Lab Status: Final result Specimen: Blood from Arm, Left Updated: 10/30/23 1301     WBC 14.26 Thousand/uL      RBC 4.85 Million/uL      Hemoglobin 13.5 g/dL      Hematocrit 41.7 %      MCV 86 fL      MCH 27.8 pg      MCHC 32.4 g/dL      RDW 14.1 %      MPV 8.8 fL      Platelets 133 Thousands/uL      nRBC 0 /100 WBCs      Neutrophils Relative 72 %      Immat GRANS % 2 %      Lymphocytes Relative 19 %      Monocytes Relative 4 %      Eosinophils Relative 3 %      Basophils Relative 0 %      Neutrophils Absolute 10.37 Thousands/µL      Immature Grans Absolute 0.21 Thousand/uL      Lymphocytes Absolute 2.69 Thousands/µL      Monocytes Absolute 0.53 Thousand/µL      Eosinophils Absolute 0.41 Thousand/µL      Basophils Absolute 0.05 Thousands/µL     FLU/RSV/COVID - if FLU/RSV clinically relevant [[id number]]  (Normal) Collected: 10/30/23 1140    Lab Status: Final result Specimen: Nares from Nose Updated: 10/30/23 1225     SARS-CoV-2 Negative     INFLUENZA A PCR Negative     INFLUENZA B PCR Negative     RSV PCR Negative    Narrative:      FOR PEDIATRIC PATIENTS - copy/paste COVID Guidelines URL to browser: [email or web address]    SARS-CoV-2 assay is a Nucleic Acid Amplification assay intended for the  qualitative detection of nucleic acid from SARS-CoV-2 in nasopharyngeal  swabs. Results are for the presumptive identification of SARS-CoV-2 RNA. Positive results are indicative of infection with SARS-CoV-2, the virus  causing COVID-19, but do not rule out bacterial infection or co-infection  with other viruses. Laboratories within the [facility] and its  territories are required to report all positive results to the appropriate  public health authorities. Negative results do not preclude SARS-CoV-2  infection and should not be used as the sole basis for treatment or other  patient management decisions. Negative results must be combined with  clinical observations, patient history, and epidemiological information. This test has not been FDA cleared or approved. This test has been authorized by FDA under an Emergency Use Authorization  (EUA). This test is only authorized for the duration of time the  declaration that circumstances exist justifying the authorization of the  emergency use of an in vitro diagnostic tests for detection of SARS-CoV-2  virus and/or diagnosis of COVID-19 infection under section 564(b)(1) of  the Act, 21 U. S.C. 761JBV-8(U)(2), unless the authorization is terminated  or revoked sooner. The test has been validated but independent review by FDA  and CLIA is pending. Test performed using [employer]:  This RT-PCR assay targets N2,  a region unique to SARS-CoV-2. A conserved region in the E-gene was chosen  for pan-Sarbecovirus detection which includes SARS-CoV-2. According to CMS-2020-01-R, this platform meets the definition of high-throughput technology. CTA ED chest PE Study   Final Result by Dona Rajan MD (10/30 0015)      No evidence of pulmonary embolus. Mediastinal and hilar lymphadenopathy, although potentially            Workstation performed: [id number]         XR chest 2 views   Final Result by Kinga Caballero MD (10/30 8448)      No acute cardiopulmonary disease.       Findings are stable            Workstation performed: [id number]                    Procedures  ECG 12 Lead Documentation Only    Date/Time: 10/30/2023 12:00 PM    Performed by: Izzy Ford DO  Authorized by: Izzy Ford DO    Patient location:  ED  Rate:     ECG rate:  102    ECG rate assessment: tachycardic    Rhythm:     Rhythm: sinus tachycardia    Ectopy:     Ectopy: none    QRS:     QRS axis:  Normal    QRS intervals:  Normal  Conduction:     Conduction: normal    ST segments:     ST segments:  Normal  T waves:     T waves: normal    ECG 12 Lead Documentation Only    Date/Time: 10/30/2023 5:56 PM    Performed by: Izzy Ford DO  Authorized by: Izzy Ford DO    Indications / Diagnosis:  Delta  Patient location:  ED  Previous ECG:     Previous ECG:  Compared to current    Similarity:  No change  Rate:     ECG rate:  115    ECG rate assessment: tachycardic    Rhythm:     Rhythm: sinus tachycardia    Ectopy:     Ectopy: none    QRS:     QRS axis:  Normal  Conduction:     Conduction: normal    ST segments:     ST segments:  Normal  T waves:     T waves: normal             ED Course  ED Course as of 10/30/23 1758   Mon Oct 30, 2023   1419 D-Dimer, Quant(!): 0.53   1519 Pulse(!): 121  Given albuterol              HEART Risk Score      Flowsheet Row Most Recent Value   Heart Score Risk Calculator    History 0 Filed at: 10/30/2023 1757   ECG 0 Filed at: 10/30/2023 1757   Age 0 Filed at: 10/30/2023 1757   Risk Factors 1 Filed at: 10/30/2023 1757   Troponin 0 Filed at: 10/30/2023 1757   HEART Score 1 Filed at: 10/30/2023 1757                          SBIRT 20yo+      Flowsheet Row Most Recent Value   Initial Alcohol Screen: US AUDIT-C     1. How often do you have a drink containing alcohol? 0 Filed at: 10/30/2023 1138   2. How many drinks containing alcohol do you have on a typical day you are drinking? 0 Filed at: 10/30/2023 1138   3a. Male UNDER 65: How often do you have five or more drinks on one occasion? 0 Filed at: 10/30/2023 1138   3b. FEMALE Any Age, or MALE 65+: How often do you have 4 or more drinks on one occassion? 0 Filed at: 10/30/2023 1138   Audit-C Score 0 Filed at: 10/30/2023 1138   MARSHALL: How many times in the past year have you. .. Used an illegal drug or used a prescription medication for non-medical reasons? Never Filed at: 10/30/2023 1138                      Medical Decision Making  30 y/o female with flu like symptoms- will get labs, ddimer, covid/ flu, trop/ EKG, and reassess    Labs and cxr reviewed and neg- will d/c home with steroids for likely asthma exacerbation. Return precautions given     Amount and/or Complexity of Data Reviewed  Labs: ordered. Decision-making details documented in ED Course. Radiology: ordered. Risk  Prescription drug management. Disposition  Final diagnoses:   Asthma exacerbation     Time reflects when diagnosis was documented in both MDM as applicable and the Disposition within this note       Time User Action Codes Description Comment    10/30/2023  3:59 PM Nickolas Mcgovern Add [H77.427] Asthma exacerbation           ED Disposition       ED Disposition   Discharge    Condition   Stable    Date/Time   Mon Oct 30, 2023 1555    [address] discharge to home/self care.                    Follow-up Information       Follow up With Specialties Details Why Contact Info Additional Information    Cody Eden MD Family Medicine Call in 1 day for follow up within 2-3 days [address] [place] [phone] [facility] Emergency Department Emergency Medicine Go to  immediately for any new or worsening symptoms Uan Ramirez [place] [address] [facility] Emergency Department, [place], [place], [address]            Discharge Medication List as of 10/30/2023  3:59 PM        START taking these medications    Details   predniSONE 20 mg tablet Take 3 tablets (60 mg total) by mouth daily for 4 days, Starting Mon 10/30/2023, Until Fri 11/3/2023, Normal           CONTINUE these medications which have NOT CHANGED    Details   albuterol (ProAir HFA) 90 mcg/act inhaler Inhale 2 puffs every 4 (four) hours as needed for wheezing or shortness of breath, Starting Tue 1/11/2022, Normal      atorvastatin (LIPITOR) 40 mg tablet Take 1 tablet (40 mg total) by mouth daily at bedtime, Starting Thu 4/29/2021, Normal      benztropine (COGENTIN) 0.5 mg tablet Take 1 tablet (0.5 mg total) by mouth daily at bedtime as needed for tremors (or jerking), Starting Mon 5/10/2021, Normal      clonazePAM (KlonoPIN) 0.5 mg tablet Take 0.5 tablets (0.25 mg total) by mouth 2 (two) times a day as needed for anxiety, Starting Mon 5/10/2021, Normal      lamoTRIgine (LaMICtal) 100 mg tablet TAKE ONE TABLET BY MOUTH EVERY 24 HRS, Historical Med      ondansetron (Zofran ODT) 4 mg disintegrating tablet Take 1 tablet (4 mg total) by mouth every 6 (six) hours as needed for nausea or vomiting, Starting Fri 1/14/2022, Normal      PARoxetine (PAXIL) 40 MG tablet Take 1 tablet (40 mg total) by mouth daily, Starting Mon 5/10/2021, Normal      QUEtiapine (SEROquel) 100 mg tablet Take 1/2 tab po qAM and 1 and 1/2 tabs po qhs, Normal             No discharge procedures on file.     PDMP Review Value Time User    PDMP Reviewed  Yes 12/28/2020  9:54 AM Alvaro Saxena PA-C            ED Provider  Electronically Signed by             Rizwan Poole DO  10/30/23 6340

## 2023-10-31 ENCOUNTER — VBI (OUTPATIENT)
Dept: FAMILY MEDICINE CLINIC | Facility: CLINIC | Age: 28
End: 2023-10-31

## 2023-10-31 LAB
ATRIAL RATE: 102 BPM
ATRIAL RATE: 115 BPM
P AXIS: 55 DEGREES
P AXIS: 60 DEGREES
PR INTERVAL: 134 MS
PR INTERVAL: 146 MS
QRS AXIS: 37 DEGREES
QRS AXIS: 55 DEGREES
QRSD INTERVAL: 84 MS
QRSD INTERVAL: 88 MS
QT INTERVAL: 320 MS
QT INTERVAL: 326 MS
QTC INTERVAL: 424 MS
QTC INTERVAL: 442 MS
T WAVE AXIS: 52 DEGREES
T WAVE AXIS: 65 DEGREES
VENTRICULAR RATE: 102 BPM
VENTRICULAR RATE: 115 BPM

## 2023-10-31 PROCEDURE — 93010 ELECTROCARDIOGRAM REPORT: CPT | Performed by: INTERNAL MEDICINE

## 2023-10-31 NOTE — TELEPHONE ENCOUNTER
10/31/23 3:17 PM    Patient contacted post ED visit, outreach attempt made but message could not be left. Additional outreach attempt will be made. Thank you.   Aldo Ross MA  [facility]

## 2023-10-31 NOTE — LETTER
[facility] [address] [place] [id number]  [phone]    Date: 11/02/23  [address]it[address] [facility] [address]    Dear Ivory Stovall:                                                                                                                              Thank you for choosing [facility] emergency department for care. Your primary care provider wants to make sure that your ongoing medical care is being addressed. If you require follow up care as a result of your emergency department visit, there are a few things the practice would like you to know. As part of the network's continuing commitment to caring for our patients, we have added more same day appointments and have extended office hours to meet your medical needs. After hours, on-call physicians are available via your primary care provider's main office line. We encourage you to contact our office prior to seeking treatment to discuss your symptoms with the medical staff. Together, we can determine the correct course of action. A majority of non-emergent conditions such as: common cold, flu-like symptoms, fevers, strains/sprains, dislocations, minor burns, cuts and animal bites can be treated at [facility]. Diagnostic testing is available at some sites. Of course, if you are experiencing a life threatening medical emergency call 911 or proceed directly to the nearest emergency room.     Your nearest [facility] facility is conveniently located at:    [address] [facility]  [address]  [phone]  [phone]  [address] offered at most [facility] your spot online at [email or web address] or on the [address] [place] [address] Drive    Sincerely,    [address] [place] [address]  Dept: [phone]

## 2023-11-01 NOTE — TELEPHONE ENCOUNTER
11/01/23 12:31 PM    Patient contacted post ED visit, outreach attempt made but message could not be left. Additional outreach attempt will be made. Thank you.   Josselyn Cerna MA  [facility]

## 2023-11-02 NOTE — TELEPHONE ENCOUNTER
11/02/23 12:05 PM    Patient contacted post ED visit, phone outreaches were unsuccessful and a MyChart letter has been sent to the patient as follow-up. Thank you.   Syed Rider MA  [facility]

## 2024-01-12 DIAGNOSIS — F42.2 MIXED OBSESSIONAL THOUGHTS AND ACTS: ICD-10-CM

## 2024-01-12 DIAGNOSIS — F41.1 GAD (GENERALIZED ANXIETY DISORDER): ICD-10-CM

## 2024-01-12 DIAGNOSIS — F43.10 PTSD (POST-TRAUMATIC STRESS DISORDER): ICD-10-CM

## 2024-01-12 NOTE — TELEPHONE ENCOUNTER
IN between looking for psychiatrist and would like to know if we can refill Paxil for her.  Thank you

## 2024-01-16 RX ORDER — PAROXETINE HYDROCHLORIDE 40 MG/1
40 TABLET, FILM COATED ORAL DAILY
Qty: 30 TABLET | Refills: 2 | Status: SHIPPED | OUTPATIENT
Start: 2024-01-16

## 2024-01-16 NOTE — TELEPHONE ENCOUNTER
Pt said she scheduled for 1/29. Pt said she is running out. If doctor is willing to prescribe can enough be sent to get her to the appt. Please, advise pt.

## 2024-04-15 ENCOUNTER — OFFICE VISIT (OUTPATIENT)
Dept: FAMILY MEDICINE CLINIC | Facility: CLINIC | Age: 29
End: 2024-04-15
Payer: COMMERCIAL

## 2024-04-15 VITALS
SYSTOLIC BLOOD PRESSURE: 128 MMHG | HEIGHT: 62 IN | BODY MASS INDEX: 53.92 KG/M2 | TEMPERATURE: 97.9 F | HEART RATE: 96 BPM | DIASTOLIC BLOOD PRESSURE: 78 MMHG | OXYGEN SATURATION: 97 % | WEIGHT: 293 LBS | RESPIRATION RATE: 18 BRPM

## 2024-04-15 DIAGNOSIS — Z30.432 ENCOUNTER FOR IUD REMOVAL: ICD-10-CM

## 2024-04-15 DIAGNOSIS — Z00.00 ANNUAL PHYSICAL EXAM: Primary | ICD-10-CM

## 2024-04-15 DIAGNOSIS — Z12.4 SCREENING FOR CERVICAL CANCER: ICD-10-CM

## 2024-04-15 DIAGNOSIS — E78.2 MIXED HYPERLIPIDEMIA: ICD-10-CM

## 2024-04-15 DIAGNOSIS — D75.839 THROMBOCYTOSIS: ICD-10-CM

## 2024-04-15 DIAGNOSIS — E66.01 MORBID OBESITY WITH BMI OF 50.0-59.9, ADULT (HCC): ICD-10-CM

## 2024-04-15 DIAGNOSIS — L30.9 DERMATITIS: ICD-10-CM

## 2024-04-15 DIAGNOSIS — F31.30 BIPOLAR AFFECTIVE DISORDER, CURRENT EPISODE DEPRESSED, CURRENT EPISODE SEVERITY UNSPECIFIED (HCC): ICD-10-CM

## 2024-04-15 DIAGNOSIS — F43.10 PTSD (POST-TRAUMATIC STRESS DISORDER): ICD-10-CM

## 2024-04-15 DIAGNOSIS — R77.2 ELEVATED ALPHA FETOPROTEIN: ICD-10-CM

## 2024-04-15 DIAGNOSIS — F42.2 MIXED OBSESSIONAL THOUGHTS AND ACTS: ICD-10-CM

## 2024-04-15 DIAGNOSIS — F41.1 GAD (GENERALIZED ANXIETY DISORDER): ICD-10-CM

## 2024-04-15 PROCEDURE — 99214 OFFICE O/P EST MOD 30 MIN: CPT | Performed by: STUDENT IN AN ORGANIZED HEALTH CARE EDUCATION/TRAINING PROGRAM

## 2024-04-15 PROCEDURE — 99395 PREV VISIT EST AGE 18-39: CPT | Performed by: STUDENT IN AN ORGANIZED HEALTH CARE EDUCATION/TRAINING PROGRAM

## 2024-04-15 RX ORDER — PAROXETINE HYDROCHLORIDE 40 MG/1
40 TABLET, FILM COATED ORAL DAILY
Qty: 30 TABLET | Refills: 2 | Status: SHIPPED | OUTPATIENT
Start: 2024-04-15

## 2024-04-15 RX ORDER — KETOCONAZOLE 20 MG/ML
1 SHAMPOO TOPICAL 2 TIMES WEEKLY
Qty: 120 ML | Refills: 1 | Status: SHIPPED | OUTPATIENT
Start: 2024-04-15

## 2024-04-15 NOTE — PROGRESS NOTES
ADULT ANNUAL PHYSICAL  [facility]    NAME: Domitila Landers  AGE: 28 y.o. SEX: female  : 1995     DATE: 4/15/2024     Assessment and Plan:     Problem List Items Addressed This Visit          Hematopoietic and Hemostatic    Thrombocytosis     Chronic  Previously worked up by heme/on and was told no follow up required            Behavioral Health    Bipolar affective disorder, current episode depressed (HCC)     No longer following with previous provider - reports she was not seeing eye-to-eye with her. Patient currently non-compliant on medications. Will place referral to establish care with new psychiatrist.           Relevant Medications    PARoxetine (PAXIL) 40 MG tablet    Other Relevant Orders    Ambulatory referral to Psych Services    SUSANNA (generalized anxiety disorder)     Managed on Paroxetine 40 mg QD  Will refill script and refer to psych for further management         Relevant Medications    PARoxetine (PAXIL) 40 MG tablet    PTSD (post-traumatic stress disorder)    Relevant Medications    PARoxetine (PAXIL) 40 MG tablet    Mixed obsessional thoughts and acts    Relevant Medications    PARoxetine (PAXIL) 40 MG tablet       Other    Mixed hyperlipidemia     Previously on Lipitor however patient reports non-compliance  Will recheck lipid panel at this time         Relevant Orders    Lipid panel    Morbid obesity with BMI of 50.0-59.9, adult (HCC)     Patient educated on the importance of weight loss and benefits of portion control and dieting.  Encouraged whole foods/Mediterranean diet, increasing consumption of vegetables, and avoiding processed/packaged foods/carbs.  Patient also educated on the importance of exercise. Encouraged to engage in moderate intensity exercise for 30-50 min at least 3-5 times per week.   Will refer to weight management.   Additional information about obesity and weight loss provided in AVS.         Relevant Orders     Ambulatory Referral to Weight Management    Annual physical exam - Primary     -CRC screening: No personal or family history of colon cancer or colon polyps.  -Cervical cancer screening: Pap smear performed in 2019, will schedule for repeat PAP at this time - referral to gyn for PAP and IUD removal  -BrCa screening: There is no personal or family history of breast cancer.   -Sexual health screening: Patient is low risk for STDs, in monogamous relationship with  however they are not sexually active.   -Advised of the importance of dental hygiene and routine dental and eye visits.  -Smoking history: Never smoker           Relevant Orders    Ambulatory Referral to Dentistry    Ambulatory Referral to Optometry     Other Visit Diagnoses       Encounter for IUD removal        Relevant Orders    Ambulatory Referral to Obstetrics / Gynecology    Screening for cervical cancer        Relevant Orders    Ambulatory Referral to Obstetrics / Gynecology    Dermatitis        Of scalp and knee    Relevant Medications    ketoconazole (NIZORAL) 2 % shampoo    hydrocortisone 2.5 % cream    Other Relevant Orders    Ambulatory Referral to Dermatology    Elevated alpha fetoprotein        Relevant Orders    Gamma GT            Immunizations and preventive care screenings were discussed with patient today. Appropriate education was printed on patient's after visit summary.    Counseling:  Alcohol/drug use: discussed moderation in alcohol intake, the recommendations for healthy alcohol use, and avoidance of illicit drug use.  Dental Health: discussed importance of regular tooth brushing, flossing, and dental visits.  Exercise: the importance of regular exercise/physical activity was discussed. Recommend exercise 3-5 times per week for at least 30 minutes.          No follow-ups on file.     Chief Complaint:     Chief Complaint   Patient presents with    Physical Exam    Medication Refill      History of Present Illness:     Adult  Annual Physical   Patient here for a comprehensive physical exam. The patient lives with her . They are not sexually active as she has low libido since starting psych mediations. Previously following with psychiatry however lost to follow up.  Patient currently managed on Paxil, Seroquel, Lamictal, Klonopin, and Cogentin. Requesting refill for Paxil at today's visit. Reports non-compliance on other medications.     Diet and Physical Activity  Diet/Nutrition: reports to eating fruits, vegetables, and limiting junk food.   Exercise: Walking 3-4 times a week on average. Reports gaining 150 lbs since starting psych meds and having IUD insert.      Depression Screening  PHQ-2/9 Depression Screening    Little interest or pleasure in doing things: 0 - not at all  Feeling down, depressed, or hopeless: 0 - not at all  PHQ-2 Score: 0  PHQ-2 Interpretation: Negative depression screen       General Health  Sleep: gets 7-8 hours of sleep on average.   Hearing: normal - bilateral.  Vision: vision problems: trouble seeing far distances .   Dental: no dental visits for >1 year.       /GYN Health  Follows with gynecology? no   Last menstrual period: only has breakthrough bleeding  Contraceptive method: IUD placement.        Review of Systems:     Review of Systems   Constitutional:  Negative for chills and fever.   HENT:  Negative for congestion, ear pain, rhinorrhea and sinus pain.    Eyes:  Negative for visual disturbance.   Respiratory:  Negative for chest tightness, shortness of breath and wheezing.    Cardiovascular:  Negative for chest pain and palpitations.   Gastrointestinal:  Negative for abdominal pain, constipation, diarrhea and vomiting.   Endocrine: Negative for polyuria.   Genitourinary:  Negative for dysuria.   Musculoskeletal:  Negative for myalgias.   Neurological:  Negative for dizziness, syncope and light-headedness.      Past Medical History:     Past Medical History:   Diagnosis Date    Bipolar disorder  (HCC)     Chronic kidney disease     Depression     Fatty liver     Hyperlipidemia 1/13/2020    Self-injurious behavior     Suicide attempt (HCC)     2016      Past Surgical History:     Past Surgical History:   Procedure Laterality Date    NO PAST SURGERIES        Social History:     Social History     Socioeconomic History    Marital status: /Civil Union     Spouse name: None    Number of children: None    Years of education: None    Highest education level: None   Occupational History    Occupation: Unemployed   Tobacco Use    Smoking status: Never    Smokeless tobacco: Never   Vaping Use    Vaping status: Never Used   Substance and Sexual Activity    Alcohol use: Yes     Comment: 1-2 mixed drinks once monthly or less    Drug use: Never    Sexual activity: Yes     Partners: Male     Birth control/protection: I.U.D.   Other Topics Concern    None   Social History Narrative    None     Social Determinants of Health     Financial Resource Strain: Not on file   Food Insecurity: Not on file   Transportation Needs: Not on file   Physical Activity: Not on file   Stress: Not on file   Social Connections: Not on file   Intimate Partner Violence: Not on file   Housing Stability: Not on file      Family History:     Family History   Problem Relation Age of Onset    Cervical cancer Mother     Heart attack Mother     Anxiety disorder Mother     Depression Mother     Diverticulitis Mother     Heart attack Father     Hypertension Father     Hyperlipidemia Father     No Known Problems Brother     Dementia Maternal Grandmother     Alzheimer's disease Maternal Grandmother     Throat cancer Paternal Grandfather     Schizophrenia Maternal Aunt       Current Medications:     Current Outpatient Medications   Medication Sig Dispense Refill    albuterol (ProAir HFA) 90 mcg/act inhaler Inhale 2 puffs every 4 (four) hours as needed for wheezing or shortness of breath 8.5 g 0    atorvastatin (LIPITOR) 40 mg tablet Take 1 tablet (40  "mg total) by mouth daily at bedtime 90 tablet 4    benztropine (COGENTIN) 0.5 mg tablet Take 1 tablet (0.5 mg total) by mouth daily at bedtime as needed for tremors (or jerking) 30 tablet 2    clonazePAM (KlonoPIN) 0.5 mg tablet Take 0.5 tablets (0.25 mg total) by mouth 2 (two) times a day as needed for anxiety 30 tablet 2    hydrocortisone 2.5 % cream Apply topically 2 (two) times a day 20 g 1    ketoconazole (NIZORAL) 2 % shampoo Apply 1 Application topically 2 (two) times a week 120 mL 1    lamoTRIgine (LaMICtal) 100 mg tablet TAKE ONE TABLET BY MOUTH EVERY 24 HRS      PARoxetine (PAXIL) 40 MG tablet Take 1 tablet (40 mg total) by mouth daily 30 tablet 2    QUEtiapine (SEROquel) 100 mg tablet Take 1/2 tab po qAM and 1 and 1/2 tabs po qhs 75 tablet 2     No current facility-administered medications for this visit.      Allergies:     No Known Allergies   Physical Exam:     /78 (BP Location: Left arm, Patient Position: Sitting, Cuff Size: Large)   Pulse 96   Temp 97.9 °F (36.6 °C)   Resp 18   Ht 5' 2\" (1.575 m)   Wt (!) 139 kg (306 lb)   SpO2 97%   BMI 55.97 kg/m²     Physical Exam  Vitals and nursing note reviewed.   Constitutional:       General: She is not in acute distress.     Appearance: She is well-developed.   HENT:      Head: Normocephalic and atraumatic.      Comments: Xerosis, scaling, and erythematous patches of scalp  Eyes:      Conjunctiva/sclera: Conjunctivae normal.   Cardiovascular:      Rate and Rhythm: Normal rate and regular rhythm.      Heart sounds: No murmur heard.  Pulmonary:      Effort: Pulmonary effort is normal. No respiratory distress.      Breath sounds: Normal breath sounds.   Abdominal:      Palpations: Abdomen is soft.      Tenderness: There is no abdominal tenderness.   Musculoskeletal:         General: No swelling.      Cervical back: Neck supple.   Skin:     General: Skin is warm and dry.      Capillary Refill: Capillary refill takes less than 2 seconds.      " Findings: Rash (erythematous macular papular rash with scabbing of right knee - please see image below) present.   Neurological:      Mental Status: She is alert.   Psychiatric:         Mood and Affect: Mood normal.           Maddie Pineda MD   [facility]

## 2024-04-15 NOTE — ASSESSMENT & PLAN NOTE
Patient educated on the importance of weight loss and benefits of portion control and dieting.  Encouraged whole foods/Mediterranean diet, increasing consumption of vegetables, and avoiding processed/packaged foods/carbs.  Patient also educated on the importance of exercise. Encouraged to engage in moderate intensity exercise for 30-50 min at least 3-5 times per week.   Will refer to weight management.   Additional information about obesity and weight loss provided in AVS.

## 2024-04-15 NOTE — ASSESSMENT & PLAN NOTE
No longer following with previous provider - reports she was not seeing eye-to-eye with her. Patient currently non-compliant on medications. Will place referral to establish care with new psychiatrist.

## 2024-04-15 NOTE — ASSESSMENT & PLAN NOTE
-CRC screening: No personal or family history of colon cancer or colon polyps.  -Cervical cancer screening: Pap smear performed in 2019, will schedule for repeat PAP at this time - referral to gyn for PAP and IUD removal  -BrCa screening: There is no personal or family history of breast cancer.   -Sexual health screening: Patient is low risk for STDs, in monogamous relationship with  however they are not sexually active.   -Advised of the importance of dental hygiene and routine dental and eye visits.  -Smoking history: Never smoker

## 2024-04-15 NOTE — ASSESSMENT & PLAN NOTE
Previously on Lipitor however patient reports non-compliance  Will recheck lipid panel at this time

## 2024-05-02 ENCOUNTER — TELEPHONE (OUTPATIENT)
Dept: PSYCHIATRY | Facility: CLINIC | Age: 29
End: 2024-05-02

## 2024-05-02 NOTE — TELEPHONE ENCOUNTER
Patient has been added to the Medication Management wait list with a referral.    Insurance: bc  Insurance Type:    Commercial [x]   Medicaid []   [facility] (if applicable)   Medicare []  Location Preference: no loc pref  Provider Preference: no prov pref  Virtual: Yes [] No [x]  Were outside resources sent: Yes [] No [x]

## 2024-05-02 NOTE — TELEPHONE ENCOUNTER
Contacted patient in regards to Routine Referral in attempts to verify patient's needs of services and add patient to proper wait list. LVM for patient to contact intake dept  in regards to referral at [phone]. Second attempt. Closing referral.

## 2024-05-02 NOTE — TELEPHONE ENCOUNTER
Patient left VM returning call from IC. Writer returned call, no answer, left VM to contact our office back in regard to services.

## 2024-05-08 ENCOUNTER — OFFICE VISIT (OUTPATIENT)
Dept: BARIATRICS | Facility: CLINIC | Age: 29
End: 2024-05-08
Payer: COMMERCIAL

## 2024-05-08 VITALS
DIASTOLIC BLOOD PRESSURE: 84 MMHG | BODY MASS INDEX: 51.91 KG/M2 | HEIGHT: 63 IN | SYSTOLIC BLOOD PRESSURE: 122 MMHG | RESPIRATION RATE: 18 BRPM | OXYGEN SATURATION: 96 % | HEART RATE: 104 BPM | WEIGHT: 293 LBS

## 2024-05-08 DIAGNOSIS — E88.819 INSULIN RESISTANCE: ICD-10-CM

## 2024-05-08 DIAGNOSIS — E66.01 MORBID OBESITY WITH BMI OF 50.0-59.9, ADULT (HCC): Primary | ICD-10-CM

## 2024-05-08 DIAGNOSIS — F31.30 BIPOLAR AFFECTIVE DISORDER, CURRENT EPISODE DEPRESSED (HCC): ICD-10-CM

## 2024-05-08 DIAGNOSIS — E03.8 SUBCLINICAL HYPOTHYROIDISM: ICD-10-CM

## 2024-05-08 PROCEDURE — 99204 OFFICE O/P NEW MOD 45 MIN: CPT | Performed by: FAMILY MEDICINE

## 2024-05-08 NOTE — PROGRESS NOTES
Assessment/Plan:  Domitila was seen today for consult.    Diagnoses and all orders for this visit:    Subclinical hypothyroidism  -     TSH w/Reflex; Future  -     Comprehensive metabolic panel; Future    Morbid obesity with BMI of 50.0-59.9, adult (HCC)  -     Ambulatory Referral to Weight Management  -     Comprehensive metabolic panel; Future  -     Semaglutide-Weight Management (WEGOVY) 0.25 MG/0.5ML; Inject 0.5 mL (0.25 mg total) under the skin once a week    Insulin resistance  -     Hemoglobin A1C; Future  -     Comprehensive metabolic panel; Future    Bipolar affective disorder, current episode depressed (HCC)     She has been trough a lot of trauma, mom was not supportive and she had developed emotional trauma and eating disorder , anorexia and was making herself vomit   Trying to be better now, looking for a psychiatrist to continue her current medication combo   Contraception discussed and she has an IUD but needs to be taken out ( 9 years old)   Advised her to discuss contraception with gyn   - Patient counselled about the potential side effects of this medication that are listed by the drug .Patient denies personal history of pancreatitis. Patient also denies personal and family history of medullary thyroid cancer and multiple endocrine neoplasia type 2 (MEN 2 tumor). Contraception methods needed and encouraged, risk for  fetal harm discussed.   Patient counselled to view the website for this medicine and read detailed instructions and side effects. Patient was given opportunity to ask questions and all questions were answered.  Patient confirms understanding and agrees to use the medication as prescribed.    CT 2017 LIVER/BILIARY TREE:  Liver is enlarged measuring 17.9cm craniocaudal.  No focal hepatic lesions are noted.  Hepatic contours are within normal limits.  No biliary dilatation.  Though slightly hypoattenuating, the liver does not meet criteria for   steatosis on this enhanced  C  Obesity:   Weight not at goal - Discussed options of [facility]-Intensive Lifestyle Intervention Program, Conservative Program, Marielos-En-Y Gastric Bypass, and Vertical Sleeve Gastrectomy and the role of weight loss medications.  - Patient is interested in pursuing Conservative Program, Marielos-En-Y Gastric Bypass, and Vertical Sleeve Gastrectomy  - Initial weight loss goal of 5-10% weight loss for improved health  - Weight loss can improve patient's co-morbid conditions and/or prevent weight-related complications.  Motivational interview performed and patient noted changes to work on until next visit  Handouts provided:  Bariatric surgery  :agrees to meet the surgery team  Calorie goal handouts provided  1500kcal  Hydration: 64oz fluid, no sugary drinks  Goal 3 meals per day  Food log encouraged , phone didi or paper journal  Increase physical activity by 10 minutes daily.   Return in 2 mo with weight management and bariatric surgeon    Subjective:   Chief Complaint   Patient presents with    Consult     Initial Consult. Waist:  inches.  SB =  /8.       Patient ID: Domitila Landers  is a 28 y.o. female with excess weight/obesity here to pursue weight management.  Previous notes and records have been reviewed.        HPI  Wt Readings from Last 20 Encounters:   04/15/24 (!) 139 kg (306 lb)   10/30/23 136 kg (300 lb)   03/23/22 122 kg (270 lb)   01/11/22 127 kg (280 lb)   04/29/21 (!) 138 kg (304 lb)   11/16/20 129 kg (285 lb)   10/19/20 128 kg (282 lb)   03/14/20 119 kg (261 lb 7.5 oz)   03/10/20 122 kg (269 lb 6.4 oz)   03/05/20 120 kg (264 lb)   01/29/20 119 kg (263 lb)   01/13/20 122 kg (269 lb)   09/16/19 120 kg (264 lb)   01/10/19 117 kg (258 lb 6.4 oz)   06/23/15 78 kg (172 lb) (92%, Z= 1.43)*   05/11/15 79.4 kg (175 lb) (93%, Z= 1.50)*   03/24/15 79.8 kg (176 lb) (94%, Z= 1.53)*   02/24/15 80.3 kg (177 lb) (94%, Z= 1.55)*   09/09/14 78.9 kg (174 lb) (94%, Z= 1.52)*     * Growth percentiles are based on CDC  "(Girls, 2-20 Years) data.   Weight gain on Paxil had it for 8 years   Has bipolar disorder and tries to stick to a schedule  for herself   She wants to be vegetarian but  likes meat   B-poptart 2 pieces  L-skips  D-plant based meat and veggie rice   Snacks:fruit  Hydration: water or sprite once in a while  Alcohol: no  Smoking:no  Exercise: walks around the store monitor her steps  Occupation:not working, lives with her    Sleep:has to take melatonin  STOP bang:was dg with BARRERA does not have  machine    Past Medical History:   Diagnosis Date    Bipolar disorder (HCC)     Chronic kidney disease     Depression     Fatty liver     Hyperlipidemia 1/13/2020    Self-injurious behavior     Suicide attempt (HCC)     2016     Past Surgical History:   Procedure Laterality Date    NO PAST SURGERIES       The following portions of the patient's history were reviewed and updated as appropriate: allergies, current medications, past family history, past medical history, past social history, past surgical history, and problem list.    ROS:  Review of Systems   Constitutional: Negative for activity change. Fatigue  HENT: Negative for trouble swallowing.    Respiratory: Negative for shortness of breath.    Cardiovascular: Negative for chest pain, edema  Gastrointestinal: Negative for abdominal pain, nausea and vomiting,+ acid reflux, constipation/diarrhea  Psychiatric/Behavioral: controlled  anxiety /depression  Objective:  /84 (BP Location: Left arm, Patient Position: Sitting, Cuff Size: Adult)   Pulse 104   Resp 18   Ht 5' 3\" (1.6 m)   Wt (!) 139 kg (305 lb 9.6 oz)   SpO2 96%   BMI 54.13 kg/m²   Constitutional: Well-developed, well-nourished and Obese Body mass index is 54.13 kg/m².. Alert, cooperative.  HEENT: No conjunctival injection. No thyroid masses  Pulmonary: No increased work of breathing or signs of respiratory distress.Clear respiratory sounds.  CV: Well-perfused, Regular rate and rhythm, no " murmurs  Vascular: no peripheral edema  GI: Abdomen obese, Non-distended. Not tender  MSK: no sarcopenia noted   Neuro: Oriented to person, place and time. Normal Speech. Normal gait.  Psych: Normal affect and mood. Normal thought process, no delusions     Labs and Imaging  Recent labs and imaging have been personally reviewed.  Lab Results   Component Value Date    WBC 14.26 (H) 10/30/2023    HGB 13.5 10/30/2023    HCT 41.7 10/30/2023    MCV 86 10/30/2023     (H) 10/30/2023     Lab Results   Component Value Date    SODIUM 137 10/30/2023    K 3.9 10/30/2023     10/30/2023    CO2 27 10/30/2023    AGAP 10 10/30/2023    BUN 8 10/30/2023    CREATININE 0.64 10/30/2023    GLUC 118 10/30/2023    GLUF 77 08/17/2020    CALCIUM 9.3 10/30/2023    AST 25 10/30/2023    ALT 30 10/30/2023    ALKPHOS 115 (H) 10/30/2023    TP 7.8 10/30/2023    TBILI 0.42 10/30/2023    EGFR 121 10/30/2023     Lab Results   Component Value Date    HGBA1C 5.4 04/14/2020     Lab Results   Component Value Date    ZCT0GASABGRE 1.780 10/30/2023    TSH 3.14 01/11/2019     Lab Results   Component Value Date    CHOLESTEROL 174 08/17/2020     Lab Results   Component Value Date    HDL 36 (L) 08/17/2020     Lab Results   Component Value Date    TRIG 396 (H) 08/17/2020     Lab Results   Component Value Date    LDLCALC 59 08/17/2020

## 2024-05-20 ENCOUNTER — CONSULT (OUTPATIENT)
Dept: BARIATRICS | Facility: CLINIC | Age: 29
End: 2024-05-20
Payer: COMMERCIAL

## 2024-05-20 VITALS
WEIGHT: 293 LBS | OXYGEN SATURATION: 98 % | SYSTOLIC BLOOD PRESSURE: 118 MMHG | RESPIRATION RATE: 18 BRPM | BODY MASS INDEX: 51.91 KG/M2 | HEART RATE: 96 BPM | DIASTOLIC BLOOD PRESSURE: 76 MMHG | HEIGHT: 63 IN

## 2024-05-20 DIAGNOSIS — E66.01 MORBID (SEVERE) OBESITY DUE TO EXCESS CALORIES (HCC): ICD-10-CM

## 2024-05-20 DIAGNOSIS — E66.01 CLASS 3 SEVERE OBESITY DUE TO EXCESS CALORIES WITH SERIOUS COMORBIDITY AND BODY MASS INDEX (BMI) OF 50.0 TO 59.9 IN ADULT (HCC): ICD-10-CM

## 2024-05-20 DIAGNOSIS — Z01.818 ENCOUNTER FOR OTHER PREPROCEDURAL EXAMINATION: Primary | ICD-10-CM

## 2024-05-20 DIAGNOSIS — F31.30 BIPOLAR AFFECTIVE DISORDER, CURRENT EPISODE DEPRESSED, CURRENT EPISODE SEVERITY UNSPECIFIED (HCC): ICD-10-CM

## 2024-05-20 DIAGNOSIS — E03.8 SUBCLINICAL HYPOTHYROIDISM: ICD-10-CM

## 2024-05-20 DIAGNOSIS — F41.1 GAD (GENERALIZED ANXIETY DISORDER): ICD-10-CM

## 2024-05-20 DIAGNOSIS — F43.10 PTSD (POST-TRAUMATIC STRESS DISORDER): ICD-10-CM

## 2024-05-20 DIAGNOSIS — G47.33 OBSTRUCTIVE SLEEP APNEA: ICD-10-CM

## 2024-05-20 DIAGNOSIS — E78.2 MIXED HYPERLIPIDEMIA: ICD-10-CM

## 2024-05-20 PROBLEM — E66.813 CLASS 3 SEVERE OBESITY DUE TO EXCESS CALORIES WITH SERIOUS COMORBIDITY AND BODY MASS INDEX (BMI) OF 50.0 TO 59.9 IN ADULT (HCC): Status: ACTIVE | Noted: 2024-05-20

## 2024-05-20 PROCEDURE — 99204 OFFICE O/P NEW MOD 45 MIN: CPT | Performed by: SURGERY

## 2024-05-20 NOTE — PROGRESS NOTES
"    BARIATRIC INITIAL CONSULT - BARIATRIC SURGERY    Domitila Landers 28 y.o. female MRN: [id number]  Unit/Bed#:  Encounter: [id number]      HPI:  Domitila Landers is a 28 y.o. female who presents with a longstanding history of morbid obesity and inability to sustain a meaningful weight loss.  She is not currently employed.  She states she gained weight with medication for treating bipolar.  She desires to pursue metabolic and bariatric surgery to improve her health.  Diet controlled GERD.  Intermittent NSAIDs.  Denies tobacco.  Denies DVT/PE.  Here today to discuss bariatric options.    Visit type: initial visit    Symptoms: inability to loss weight    Associated Symptoms: none    Associated Conditions: low HDL and elevated triglycerides  Disease Complications: sleep apnea  Weight Loss Interest: high    Exercise Frequency:daily  Types of Exercise: walking      Review of Systems   Neurological:  Positive for headaches.   All other systems reviewed and are negative.      Historical Information   Past Medical History:   Diagnosis Date    Bipolar disorder (HCC)     Chronic kidney disease     Depression     Fatty liver     Hyperlipidemia 1/13/2020    Self-injurious behavior     Suicide attempt (HCC)     2016     Past Surgical History:   Procedure Laterality Date    NO PAST SURGERIES       Social History   Social History     Substance and Sexual Activity   Alcohol Use Yes    Comment: 1-2 mixed drinks once monthly or less     Social History     Substance and Sexual Activity   Drug Use Never     Social History     Tobacco Use   Smoking Status Never   Smokeless Tobacco Never     Family History: non-contributory    Meds/Allergies   all medications and allergies reviewed  No Known Allergies    Objective       Current Vitals:   /76 (BP Location: Left arm, Patient Position: Sitting, Cuff Size: Adult)   Pulse 96   Resp 18   Ht 5' 3\" (1.6 m)   Wt (!) 138 kg (304 lb 3.2 oz)   SpO2 98%   BMI 53.89 kg/m²       Invasive " Devices       None                   Physical Exam  Constitutional:       Appearance: Normal appearance.   HENT:      Head: Atraumatic.      Nose: No rhinorrhea.   Eyes:      Extraocular Movements: Extraocular movements intact.   Cardiovascular:      Rate and Rhythm: Normal rate.   Pulmonary:      Effort: Pulmonary effort is normal. No respiratory distress.   Abdominal:      General: Abdomen is flat. There is no distension.   Musculoskeletal:         General: Normal range of motion.      Cervical back: Normal range of motion.   Skin:     General: Skin is warm and dry.   Neurological:      General: No focal deficit present.      Mental Status: She is alert and oriented to person, place, and time.   Psychiatric:         Mood and Affect: Mood normal.         Behavior: Behavior normal.         Lab Results: I have personally reviewed pertinent lab results.    Imaging: I have personally reviewed pertinent reports.    EKG, Pathology, and Other Studies: I have personally reviewed pertinent reports.        Assessment/PLAN:    28 y.o. yo female with a long standing h/o of obesity and inability to sustain any meaningful weight loss on her own despite several attempts.    She is interested in the Laparoscopic sleeve gastrectomy.    Patient has been counseled about the risk of developing gastroesophageal reflux disease (GERD), worsening of current GERD and/or silent reflux. Patient has also been counseled on the risk of developing Alicea's esophagus (18%). As a result the patient may require treatment with medications, further interventions and possibly additional surgery. Patient will require routine endoscopic surveillance to monitor for these possible complications.     As a part of her pre op evaluation, she will be referred to a cardiologist and for a sleep evaluation and consult after successfully completing an evaluation with our pre-certification/, registered dietician and licensed clinical social  worker.    She needs an EGD to evaluate the anatomy of her GI tract prior to the operation.  I have spent over 45 minutes with her face to face in the office today discussing her options and details of the surgery. Over 50% of this was coordinating care.    She was given the opportunity to ask questions and I have answered all of them.  I have discussed and educated the patient with regards to the components of our multidisciplinary program and the importance of compliance and follow up in the post operative period. The patient was also instructed with regards to the importance of behavior modification, nutritional counseling, support meeting attendance and lifestyle changes that are important to ensure success.     Although there is a great statistical chance of improvement or even resolution of most of her associated comorbidities, the results vary from patient to patient and they largely depend on her commitment and compliance.         Angie Dickens MD  5/20/2024  9:23 AM

## 2024-05-20 NOTE — LETTER
May 20, 2024     Cinthya Barros MD    Patient: Domitila Landers   YOB: 1995   Date of Visit: 5/20/2024       Dear Dr. Barros:    Thank you for referring Domitila Landers to me for evaluation for metabolic and bariatric surgery. Below are my notes for this consultation.    If you have questions, please do not hesitate to call me. I look forward to following your patient along with you.         Sincerely,        Angie Dickens MD        CC: No Recipients    Angie Dickens MD  5/20/2024  9:25 AM  Sign when Signing Visit      BARIATRIC INITIAL CONSULT - BARIATRIC SURGERY    Domitila Landers 28 y.o. female MRN: [id number]  Unit/Bed#:  Encounter: [id number]      HPI:  Domitila Landers is a 28 y.o. female who presents with a longstanding history of morbid obesity and inability to sustain a meaningful weight loss.  She is not currently employed.  She states she gained weight with medication for treating bipolar.  She desires to pursue metabolic and bariatric surgery to improve her health.  Diet controlled GERD.  Intermittent NSAIDs.  Denies tobacco.  Denies DVT/PE.  Here today to discuss bariatric options.    Visit type: initial visit    Symptoms: inability to loss weight    Associated Symptoms: none    Associated Conditions: low HDL and elevated triglycerides  Disease Complications: sleep apnea  Weight Loss Interest: high    Exercise Frequency:daily  Types of Exercise: walking      Review of Systems   Neurological:  Positive for headaches.   All other systems reviewed and are negative.      Historical Information  Past Medical History:   Diagnosis Date   • Bipolar disorder (HCC)    • Chronic kidney disease    • Depression    • Fatty liver    • Hyperlipidemia 1/13/2020   • Self-injurious behavior    • Suicide attempt (HCC)     2016     Past Surgical History:   Procedure Laterality Date   • NO PAST SURGERIES       Social History  Social History     Substance and Sexual Activity   Alcohol Use Yes     "Comment: 1-2 mixed drinks once monthly or less     Social History     Substance and Sexual Activity   Drug Use Never     Social History     Tobacco Use   Smoking Status Never   Smokeless Tobacco Never     Family History: non-contributory    Meds/Allergies  all medications and allergies reviewed  No Known Allergies    Objective      Current Vitals:   /76 (BP Location: Left arm, Patient Position: Sitting, Cuff Size: Adult)   Pulse 96   Resp 18   Ht 5' 3\" (1.6 m)   Wt (!) 138 kg (304 lb 3.2 oz)   SpO2 98%   BMI 53.89 kg/m²       Invasive Devices       None                   Physical Exam  Constitutional:       Appearance: Normal appearance.   HENT:      Head: Atraumatic.      Nose: No rhinorrhea.   Eyes:      Extraocular Movements: Extraocular movements intact.   Cardiovascular:      Rate and Rhythm: Normal rate.   Pulmonary:      Effort: Pulmonary effort is normal. No respiratory distress.   Abdominal:      General: Abdomen is flat. There is no distension.   Musculoskeletal:         General: Normal range of motion.      Cervical back: Normal range of motion.   Skin:     General: Skin is warm and dry.   Neurological:      General: No focal deficit present.      Mental Status: She is alert and oriented to person, place, and time.   Psychiatric:         Mood and Affect: Mood normal.         Behavior: Behavior normal.         Lab Results: I have personally reviewed pertinent lab results.    Imaging: I have personally reviewed pertinent reports.    EKG, Pathology, and Other Studies: I have personally reviewed pertinent reports.        Assessment/PLAN:    28 y.o. yo female with a long standing h/o of obesity and inability to sustain any meaningful weight loss on her own despite several attempts.    She is interested in the Laparoscopic sleeve gastrectomy.    Patient has been counseled about the risk of developing gastroesophageal reflux disease (GERD), worsening of current GERD and/or silent reflux. Patient has " also been counseled on the risk of developing Alieca's esophagus (18%). As a result the patient may require treatment with medications, further interventions and possibly additional surgery. Patient will require routine endoscopic surveillance to monitor for these possible complications.     As a part of her pre op evaluation, she will be referred to a cardiologist and for a sleep evaluation and consult after successfully completing an evaluation with our pre-certification/, registered dietician and licensed clinical .    She needs an EGD to evaluate the anatomy of her GI tract prior to the operation.  I have spent over 45 minutes with her face to face in the office today discussing her options and details of the surgery. Over 50% of this was coordinating care.    She was given the opportunity to ask questions and I have answered all of them.  I have discussed and educated the patient with regards to the components of our multidisciplinary program and the importance of compliance and follow up in the post operative period. The patient was also instructed with regards to the importance of behavior modification, nutritional counseling, support meeting attendance and lifestyle changes that are important to ensure success.     Although there is a great statistical chance of improvement or even resolution of most of her associated comorbidities, the results vary from patient to patient and they largely depend on her commitment and compliance.         Angie Dickens MD  5/20/2024  9:23 AM

## 2024-05-22 ENCOUNTER — APPOINTMENT (OUTPATIENT)
Dept: LAB | Facility: MEDICAL CENTER | Age: 29
End: 2024-05-22
Payer: COMMERCIAL

## 2024-05-22 DIAGNOSIS — E78.2 MIXED HYPERLIPIDEMIA: ICD-10-CM

## 2024-05-22 DIAGNOSIS — E88.819 INSULIN RESISTANCE: ICD-10-CM

## 2024-05-22 DIAGNOSIS — E66.01 MORBID OBESITY WITH BMI OF 50.0-59.9, ADULT (HCC): ICD-10-CM

## 2024-05-22 DIAGNOSIS — E03.8 SUBCLINICAL HYPOTHYROIDISM: ICD-10-CM

## 2024-05-22 DIAGNOSIS — R77.2 ELEVATED ALPHA FETOPROTEIN: ICD-10-CM

## 2024-05-22 LAB
ALBUMIN SERPL BCP-MCNC: 4 G/DL (ref 3.5–5)
ALP SERPL-CCNC: 116 U/L (ref 34–104)
ALT SERPL W P-5'-P-CCNC: 31 U/L (ref 7–52)
ANION GAP SERPL CALCULATED.3IONS-SCNC: 9 MMOL/L (ref 4–13)
AST SERPL W P-5'-P-CCNC: 22 U/L (ref 13–39)
BILIRUB SERPL-MCNC: 0.4 MG/DL (ref 0.2–1)
BUN SERPL-MCNC: 11 MG/DL (ref 5–25)
CALCIUM SERPL-MCNC: 9.3 MG/DL (ref 8.4–10.2)
CHLORIDE SERPL-SCNC: 98 MMOL/L (ref 96–108)
CHOLEST SERPL-MCNC: 230 MG/DL
CO2 SERPL-SCNC: 30 MMOL/L (ref 21–32)
CREAT SERPL-MCNC: 0.63 MG/DL (ref 0.6–1.3)
EST. AVERAGE GLUCOSE BLD GHB EST-MCNC: 128 MG/DL
GFR SERPL CREATININE-BSD FRML MDRD: 122 ML/MIN/1.73SQ M
GGT SERPL-CCNC: 85 U/L (ref 9–64)
GLUCOSE P FAST SERPL-MCNC: 92 MG/DL (ref 65–99)
HBA1C MFR BLD: 6.1 %
HDLC SERPL-MCNC: 32 MG/DL
NONHDLC SERPL-MCNC: 198 MG/DL
POTASSIUM SERPL-SCNC: 5.2 MMOL/L (ref 3.5–5.3)
PROT SERPL-MCNC: 7.1 G/DL (ref 6.4–8.4)
SODIUM SERPL-SCNC: 137 MMOL/L (ref 135–147)
TRIGL SERPL-MCNC: 443 MG/DL
TSH SERPL DL<=0.05 MIU/L-ACNC: 3.35 UIU/ML (ref 0.45–4.5)

## 2024-05-22 PROCEDURE — 83036 HEMOGLOBIN GLYCOSYLATED A1C: CPT

## 2024-05-22 PROCEDURE — 84443 ASSAY THYROID STIM HORMONE: CPT

## 2024-05-22 PROCEDURE — 82977 ASSAY OF GGT: CPT

## 2024-05-22 PROCEDURE — 80061 LIPID PANEL: CPT

## 2024-05-22 PROCEDURE — 36415 COLL VENOUS BLD VENIPUNCTURE: CPT

## 2024-05-22 PROCEDURE — 80053 COMPREHEN METABOLIC PANEL: CPT

## 2024-05-28 ENCOUNTER — OFFICE VISIT (OUTPATIENT)
Dept: GYNECOLOGY | Facility: CLINIC | Age: 29
End: 2024-05-28

## 2024-05-28 VITALS
SYSTOLIC BLOOD PRESSURE: 122 MMHG | HEIGHT: 63 IN | DIASTOLIC BLOOD PRESSURE: 80 MMHG | BODY MASS INDEX: 51.91 KG/M2 | WEIGHT: 293 LBS

## 2024-05-28 DIAGNOSIS — Z01.419 ENCOUNTER FOR WELL WOMAN EXAM: Primary | ICD-10-CM

## 2024-05-28 DIAGNOSIS — Z12.39 ENCOUNTER FOR SCREENING BREAST EXAMINATION: ICD-10-CM

## 2024-05-28 DIAGNOSIS — Z12.4 CERVICAL CANCER SCREENING: ICD-10-CM

## 2024-05-28 DIAGNOSIS — Z01.419 ROUTINE GYNECOLOGICAL EXAMINATION: ICD-10-CM

## 2024-05-28 DIAGNOSIS — Z12.4 SCREENING FOR CERVICAL CANCER: ICD-10-CM

## 2024-05-28 DIAGNOSIS — Z30.432 ENCOUNTER FOR IUD REMOVAL: ICD-10-CM

## 2024-05-28 PROCEDURE — G0145 SCR C/V CYTO,THINLAYER,RESCR: HCPCS | Performed by: PHYSICIAN ASSISTANT

## 2024-05-29 ENCOUNTER — TELEPHONE (OUTPATIENT)
Dept: FAMILY MEDICINE CLINIC | Facility: CLINIC | Age: 29
End: 2024-05-29

## 2024-05-29 DIAGNOSIS — R79.89 ELEVATED LFTS: ICD-10-CM

## 2024-05-29 DIAGNOSIS — E78.1 HYPERTRIGLYCERIDEMIA: Primary | ICD-10-CM

## 2024-05-29 RX ORDER — EZETIMIBE 10 MG/1
10 TABLET ORAL DAILY
Qty: 90 TABLET | Refills: 3 | Status: SHIPPED | OUTPATIENT
Start: 2024-05-29 | End: 2025-05-24

## 2024-05-29 NOTE — TELEPHONE ENCOUNTER
----- Message from Maddie Pineda MD sent at 5/29/2024 12:27 PM EDT -----  Please let patient know her Triglycerides (bad cholesterol) are signficantly elevated. This can be a combination of poor diet and the Seroquel she is taking. I would advise following up withy psychiatry to discuss medication adjustment. In the mean time I would like to obtain a RUQ ultrasound to evaluate her liver and gallbladder. I also recommend adding a triglyceride lowering medication (Zetia) to her cholesterol lowering regimen which I have sent to her pharmacy.

## 2024-05-30 NOTE — TELEPHONE ENCOUNTER
Patient returned call, tried to transfer to clinical, N/A.  Please call patient back at [phone].

## 2024-05-30 NOTE — TELEPHONE ENCOUNTER
Patient returned call and RN reviewed provider comments for lab results. Patient is agreeable to start Zetia, provided number for Central Scheduling for US ordered, and will follow up with pschiatry for possible dose adjustment for her Seroquel. Patient advised to call in when US completed so we can look out for results.

## 2024-05-31 NOTE — PROGRESS NOTES
Assessment/Plan:      Diagnoses and all orders for this visit:    Encounter for well woman exam    Encounter for screening breast examination    Cervical cancer screening    Screening for cervical cancer  -     Ambulatory Referral to Obstetrics / Gynecology    Routine gynecological examination  -     Liquid-based pap, screening  -     Molecular Hold Sample    Encounter for IUD removal  -     Ambulatory Referral to Obstetrics / Gynecology          Subjective:     Patient ID: Domitila Landers is a 28 y.o. female.    Pt presents as a new patient for her annual exam today--  She has no complaints  She has no bleeding or pelvic pain--MIRENA since ~ 2015  Would like it taken out for a break  Always regular before iud  Bowel and bladder are regular  No breast concerns today    pap today.    Iud removed intact without difficulties        Review of Systems   Constitutional:  Negative for chills, fever and unexpected weight change.   HENT:  Negative for ear pain and sore throat.    Eyes:  Negative for pain and visual disturbance.   Respiratory:  Negative for cough and shortness of breath.    Cardiovascular:  Negative for chest pain and palpitations.   Gastrointestinal:  Negative for abdominal pain, blood in stool, constipation, diarrhea and vomiting.   Genitourinary: Negative.  Negative for dysuria and hematuria.   Musculoskeletal:  Negative for arthralgias and back pain.   Skin:  Negative for color change and rash.   Neurological:  Negative for seizures and syncope.   All other systems reviewed and are negative.        Objective:     Physical Exam  Vitals and nursing note reviewed.   Constitutional:       Appearance: Normal appearance. She is well-developed.   HENT:      Head: Normocephalic and atraumatic.   Chest:   Breasts:     Right: No inverted nipple, mass, nipple discharge or skin change.      Left: No inverted nipple, mass, nipple discharge or skin change.   Abdominal:      Palpations: Abdomen is soft.    Genitourinary:     General: Normal vulva.      Exam position: Supine.      Labia:         Right: No rash, tenderness or lesion.         Left: No rash, tenderness or lesion.       Vagina: Normal.      Cervix: No cervical motion tenderness, discharge or friability.      Adnexa:         Right: No mass, tenderness or fullness.          Left: No mass, tenderness or fullness.     Musculoskeletal:      Cervical back: Normal range of motion.   Lymphadenopathy:      Lower Body: No right inguinal adenopathy. No left inguinal adenopathy.   Neurological:      Mental Status: She is alert.

## 2024-06-03 ENCOUNTER — TELEPHONE (OUTPATIENT)
Age: 29
End: 2024-06-03

## 2024-06-03 ENCOUNTER — TELEPHONE (OUTPATIENT)
Dept: BARIATRICS | Facility: CLINIC | Age: 29
End: 2024-06-03

## 2024-06-03 NOTE — TELEPHONE ENCOUNTER
A representative from [facility] in regards to med PA. They provided the following info: Submit PA through [employer]. Questions to [phone].

## 2024-06-03 NOTE — TELEPHONE ENCOUNTER
Please start a PA for Patient's Wegovy.  Thank you.     Medication  Semaglutide-Weight Management (WEGOVY) 0.25 MG/0.5ML [[id number]]  Semaglutide-Weight Management (WEGOVY) 0.25 MG/0.5ML [[id number]]    Order Details  Dose: 0.25 mg Route: Subcutaneous Frequency: Weekly   Dispense Quantity: 2 mL Refills: 0          Sig: Inject 0.5 mL (0.25 mg total) under the skin once a week         Start Date: 05/08/24 End Date: --   Written Date: 05/08/24 Expiration Date: 05/08/25       Associated Diagnoses: Morbid obesity with BMI of 50.0-59.9, adult (HCC) [E66.01, Z68.43]   Providers    Authorizing Provider:  Nara Pettit MD  [address] [place] [id number]  Phone: [phone]   Fax: [phone]  DRISS #: [id number]   NPI: [id number]        Ordering User: Nara Pettit MD          Pharmacy    [facility]/pharmacy #[id number] - [place], [place] - [address]  [address], [place] [address]  Phone: [phone]  Fax: [phone]  DRISS #: [id number]

## 2024-06-03 NOTE — TELEPHONE ENCOUNTER
Patient returning call from office as she had a 9am appt with the  and her phone would not let her answer the call. Attempted to reach both clerical and clinical and was unable to get through. Reached out to office for further clarification and was advised patient would need to reschedule appointment. Please call the patient back at [phone]. She is going to adjust her phone settings so that she is able to receive the call from the office.

## 2024-06-04 LAB
LAB AP GYN PRIMARY INTERPRETATION: NORMAL
Lab: NORMAL

## 2024-06-06 DIAGNOSIS — F41.1 GAD (GENERALIZED ANXIETY DISORDER): ICD-10-CM

## 2024-06-06 DIAGNOSIS — F43.10 PTSD (POST-TRAUMATIC STRESS DISORDER): ICD-10-CM

## 2024-06-06 DIAGNOSIS — F42.2 MIXED OBSESSIONAL THOUGHTS AND ACTS: ICD-10-CM

## 2024-06-07 ENCOUNTER — TELEPHONE (OUTPATIENT)
Dept: BARIATRICS | Facility: CLINIC | Age: 29
End: 2024-06-07

## 2024-06-07 RX ORDER — PAROXETINE HYDROCHLORIDE 40 MG/1
40 TABLET, FILM COATED ORAL DAILY
Qty: 90 TABLET | Refills: 1 | Status: SHIPPED | OUTPATIENT
Start: 2024-06-07

## 2024-06-10 ENCOUNTER — CLINICAL SUPPORT (OUTPATIENT)
Dept: BARIATRICS | Facility: CLINIC | Age: 29
End: 2024-06-10

## 2024-06-10 DIAGNOSIS — Z98.84 BARIATRIC SURGERY STATUS: Primary | ICD-10-CM

## 2024-06-10 PROCEDURE — RECHECK

## 2024-06-12 DIAGNOSIS — L30.9 DERMATITIS: ICD-10-CM

## 2024-06-12 RX ORDER — KETOCONAZOLE 20 MG/ML
1 SHAMPOO TOPICAL 2 TIMES WEEKLY
Qty: 120 ML | Refills: 1 | Status: SHIPPED | OUTPATIENT
Start: 2024-06-13

## 2024-06-12 NOTE — TELEPHONE ENCOUNTER
Refill must be reviewed and completed by the office or provider. The refill is unable to be approved or denied by the medication management team.    Off protocol

## 2024-06-15 DIAGNOSIS — Z00.6 ENCOUNTER FOR EXAMINATION FOR NORMAL COMPARISON OR CONTROL IN CLINICAL RESEARCH PROGRAM: ICD-10-CM
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
## 2024-07-11 ENCOUNTER — VBI (OUTPATIENT)
Dept: ADMINISTRATIVE | Facility: OTHER | Age: 29
End: 2024-07-11

## 2024-07-12 NOTE — PROGRESS NOTES
"Bariatric Nutrition Assessment  Evaluation Note  Insurance: No required monthly weight checks      Type of surgery    Vertical sleeve gastrectomy  Surgery Date: TBD  Surgeon: Consult with Dr. Maninder alberts 5/20/2024      Nutrition Assessment   Domitila Pruitte Leesa  28 y.o.  female     Ht 5' 3\" (1.6 m)   Wt 135 kg (296 lb 12.8 oz)   BMI 52.58 kg/m²   Initial Weight at Surgeon Consult: 304.2#   BMI: 53.9  Height: 5'3\"  Eval Weight: 296.8#   BMI: 52.6  Wt with BMI of 25: 141#  Pre-Op Excess Wt: 163.2#  BMI to Qualify at 35 = 197.5#  , PMH includes:  Obesity, BARRERA, PTSD, HLD, Fatty Liver.SUSANNA, BiPolar    Pt advised not to gain weight during preop process. Pt encouraged to lose weight via healthy eating and exercise. Pt may follow Liver Shrinking diet 2--4  weeks or more  prior to DOS depending on BMI at time. This diet will promote weight loss.    Bell- St. Jeor Equation:    [id number]  Weight Maintenance 2450  Estimated calories for weight loss 0481-8028  ( 1-2# per wk wt loss - sedentary )  Estimated protein needs 64-96 g(1.0-1.5 gms/kg IBW )   Estimated fluid needs 64-75 oz (30-35 ml/kg IBW )      NAFLD Fibrosis Score: -4.49 at 5/22/2024  9:11 AM  Calculated from:  SGOT/AST: 22 U/L at 5/22/2024  9:11 AM  SGPT/ALT: 31 U/L at 5/22/2024  9:11 AM  Serum Albumin: 4.0 g/dL at 5/22/2024  9:11 AM  Platelets: 539 Thousands/uL at 10/30/2023 12:53 PM  Age: 28 years  BMI: 53.9 at 5/20/2024  8:37 AM  Weight (recorded): 137.98 kg at 5/20/2024  8:37 AM  Height: 160.00 cm at 5/20/2024  8:37 AM  Has Diabetes: No    Weight History Reason for WLS: Dad passed from massive MI - and he was overweight  Onset of Obesity: Childhood  Family history of obesity: Yes  Wt Loss Attempts: Exercise  Self Created Diets (Portion Control, Healthy Food Choices, etc.)  Mediterrain and Vegetarian  Patient has tried the above for 6 months or more with insufficient weight loss or weight regain, which is why patient has requested to be evaluated for weight loss " surgery today  Maximum Wt Lost: 20#       Review of History and Medications   OTC: None  Possible IBS - diaarhea   Past Medical History:   Diagnosis Date    Bipolar disorder (HCC)     Chronic kidney disease     Depression     Fatty liver     Hyperlipidemia 1/13/2020    Self-injurious behavior     Suicide attempt (HCC)     2016     Past Surgical History:   Procedure Laterality Date    NO PAST SURGERIES       Social History     Socioeconomic History    Marital status: /Civil Union     Spouse name: Not on file    Number of children: Not on file    Years of education: Not on file    Highest education level: Not on file   Occupational History    Occupation: Unemployed   Tobacco Use    Smoking status: Never    Smokeless tobacco: Never   Vaping Use    Vaping status: Never Used   Substance and Sexual Activity    Alcohol use: Yes     Comment: 1-2 mixed drinks once monthly or less    Drug use: Never    Sexual activity: Yes     Partners: Male     Birth control/protection: I.U.D.   Other Topics Concern    Not on file   Social History Narrative    Not on file     Social Determinants of Health     Financial Resource Strain: Not on file   Food Insecurity: Not on file   Transportation Needs: Not on file   Physical Activity: Not on file   Stress: Not on file   Social Connections: Not on file   Intimate Partner Violence: Not on file   Housing Stability: Not on file       Current Outpatient Medications:     albuterol (ProAir HFA) 90 mcg/act inhaler, Inhale 2 puffs every 4 (four) hours as needed for wheezing or shortness of breath, Disp: 8.5 g, Rfl: 0    atorvastatin (LIPITOR) 40 mg tablet, Take 1 tablet (40 mg total) by mouth daily at bedtime, Disp: 90 tablet, Rfl: 4    benztropine (COGENTIN) 0.5 mg tablet, Take 1 tablet (0.5 mg total) by mouth daily at bedtime as needed for tremors (or jerking), Disp: 30 tablet, Rfl: 2    clonazePAM (KlonoPIN) 0.5 mg tablet, Take 0.5 tablets (0.25 mg total) by mouth 2 (two) times a day as  needed for anxiety, Disp: 30 tablet, Rfl: 2    ezetimibe (ZETIA) 10 mg tablet, Take 1 tablet (10 mg total) by mouth daily, Disp: 90 tablet, Rfl: 3    hydrocortisone 2.5 % cream, Apply topically 2 (two) times a day, Disp: 20 g, Rfl: 1    ketoconazole (NIZORAL) 2 % shampoo, APPLY 1 APPLICATION TOPICALLY 2 (TWO) TIMES A WEEK, Disp: 120 mL, Rfl: 1    lamoTRIgine (LaMICtal) 100 mg tablet, TAKE ONE TABLET BY MOUTH EVERY 24 HRS, Disp: , Rfl:     PARoxetine (PAXIL) 40 MG tablet, TAKE 1 TABLET BY MOUTH EVERY DAY, Disp: 90 tablet, Rfl: 1    QUEtiapine (SEROquel) 100 mg tablet, Take 1/2 tab po qAM and 1 and 1/2 tabs po qhs, Disp: 75 tablet, Rfl: 2    Semaglutide-Weight Management (WEGOVY) 0.25 MG/0.5ML, Inject 0.5 mL (0.25 mg total) under the skin once a week, Disp: 2 mL, Rfl: 0  Food Intake and Lifestyle Assessment   Food Intake Assessment completed via usual diet recall  Breakfast: Muffin bars - [place]  English Muffin - Jam  Snack: Fruit cup  Lunch: Vegetables -Corn, GB, Carrots - potatoes   Snack: fruit  Dinner: Plant based chicken nuggets or chicken, fish, rice & pasta -   Dessert pears, apples, nectarine  Beverage intake:  48 oz water, regular soda,  green tea - honey and Lactose free milk with cereal   Protein supplement: None  Portions:  5- 6 oz Protein  3/4 c Starch   1-1.5 c Vegetable    Estimated protein intake per day: 50-60 grams  Estimated fluid intake per day:  48-64 oz   Meals eaten away from home: 3-4X month  Typical meal pattern: 3 meals per day and 2-3 snacks per day  Eating Behaviors: Consumption of high calorie/ high fat foods, Consumption of high calorie beverages, Large portion sizes, and Mindless eating  Hx of Eating Disorder - Anorexia - No tx  reports  helped her  Food allergies or intolerances: No Known Allergies or intolerances: Lactose, Eggs, Ice cream - excess salads gives her diarrhea  Cultural or Methodist considerations: None    Physical Assessment  Physical Activity Not able to  "keep up   Takes care of 2 kittens - Has 8 yo pit mix  Types of exercise: Walking  Current physical limitations: \"mental illness\" doesn't allow her to keep up     Psychosocial Assessment   Support systems: spouse   (mom and brother not supportive)  Socioeconomic factors:   Not currently employed. She states she gained weight with medication for treating bipolar     Nutrition Diagnosis  Diagnosis: Overweight / Obesity (NC-3.3)  Related to: Physical inactivity and Excessive energy intake  As Evidenced by: BMI >25     Nutrition Prescription: Recommend the following diet  Low fat, Low sugar, High protein, and Regular    Interventions and Teaching   Discussed pre-op and post-op nutrition guidelines.       Patient educated and handouts provided.  Surgical changes to stomach / GI  Capacity of post-surgery stomach  Diet progression  Adequate hydration  Sugar and fat restriction to decrease \"dumping syndrome\"  Fat restriction to decrease steatorrhea  Expected weight loss  Weight loss plateaus/ possibility of weight regain  Exercise  Suggestions for pre-op diet  Nutrition considerations after surgery  Protein supplements  Meal planning and preparation  Appropriate carbohydrate, protein, and fat intake, and food/fluid choices to maximize safe weight loss, nutrient intake, and tolerance   Dietary and lifestyle changes  Possible problems with poor eating habits  Intuitive eating  Techniques for self monitoring and keeping daily food journal  Potential for food intolerance after surgery, and ways to deal with them including: lactose intolerance, nausea, reflux, vomiting, diarrhea, food intolerance, appetite changes, gas  Vitamin / Mineral supplementation of Multivitamin with minerals, Calcium, Vitamin B12, Iron, Fat Soluble vitamins, and Vitamin D    Patient is not currently pregnant and doesn't desire to become pregnant a minimum of one year post-op    Education provided to: patient    Barriers to learning: No barriers " identified    Readiness to change: preparation    Prior research on procedure: internet and discussed with provider  (ELVIRA has WLS in the 1990s)    Comprehension: verbalizes understanding     Expected Compliance: Fair to good  Recommendations  Pt is an appropriate candidate for surgery. N/A - Further assess - hx of eating disorders - not treated   Evaluation / Monitoring  Dietitian to Monitor: Eating pattern as discussed Tolerance of nutrition prescription Body weight Lab values Physical activity Bowel pattern    Goals  Eliminate sugar sweetened beverages, Food journal, Exercise 30 minutes 5 times per week, Complete lession plans 1-6, Eat 3 meals per day, and Eliminate mindless snacking  Follow Pre-Surgery guidelines  > Trial Baritastic for food logging  > Maintain regular meal pattern - include fruits, vegetables and whole grains  > Avoid skipping meals- Can use protein drink as meal replacement  > Decrease portions  > Focus on protein - include lean protein at each meal and snack - Learn to eat protein first  > Continue to limit processed foods, fast foods and dining away from home  > Include meal prepping  > Limit snacks - healthier choices and portion; avoid grazing  > Slow pace of eating and sip fluids  - practice 30/60 minute rule  > Eliminate caffeine by day of surgery  > Reduce/eliminate carbonation by pre-op diet  > Increase water intake - 64 oz  > Increase physical activity/establish exercise regimen as able  > Start multi vitamin and additional Vitamin D 2000IU  Pre-op weight loss not required but advised not to gain weight  Glycemic Control: HgA1c 6.1 on  5/22/24   F/U next month with bariatric provider  (Was deferred to psychiatry by MYAH)      Time Spent:   1 Hour

## 2024-07-15 ENCOUNTER — CLINICAL SUPPORT (OUTPATIENT)
Dept: BARIATRICS | Facility: CLINIC | Age: 29
End: 2024-07-15

## 2024-07-15 VITALS — HEIGHT: 63 IN | WEIGHT: 293 LBS | BODY MASS INDEX: 51.91 KG/M2

## 2024-07-15 VITALS — BODY MASS INDEX: 52.58 KG/M2 | WEIGHT: 293 LBS

## 2024-07-15 DIAGNOSIS — E66.01 MORBID (SEVERE) OBESITY DUE TO EXCESS CALORIES (HCC): Primary | ICD-10-CM

## 2024-07-15 DIAGNOSIS — Z71.89 ENCOUNTER FOR PRE-BARIATRIC SURGERY COUNSELING AND EDUCATION: Primary | ICD-10-CM

## 2024-07-15 PROCEDURE — RECHECK

## 2024-07-17 ENCOUNTER — OFFICE VISIT (OUTPATIENT)
Age: 29
End: 2024-07-17
Payer: COMMERCIAL

## 2024-07-17 VITALS
OXYGEN SATURATION: 98 % | WEIGHT: 293 LBS | SYSTOLIC BLOOD PRESSURE: 110 MMHG | HEART RATE: 95 BPM | DIASTOLIC BLOOD PRESSURE: 62 MMHG | HEIGHT: 63 IN | BODY MASS INDEX: 51.91 KG/M2

## 2024-07-17 DIAGNOSIS — G47.19 EXCESSIVE DAYTIME SLEEPINESS: Primary | ICD-10-CM

## 2024-07-17 DIAGNOSIS — G47.33 OSA (OBSTRUCTIVE SLEEP APNEA): ICD-10-CM

## 2024-07-17 DIAGNOSIS — Z98.84 BARIATRIC SURGERY STATUS: ICD-10-CM

## 2024-07-17 DIAGNOSIS — E66.01 CLASS 3 SEVERE OBESITY DUE TO EXCESS CALORIES WITHOUT SERIOUS COMORBIDITY WITH BODY MASS INDEX (BMI) OF 50.0 TO 59.9 IN ADULT (HCC): ICD-10-CM

## 2024-07-17 PROCEDURE — 99204 OFFICE O/P NEW MOD 45 MIN: CPT | Performed by: INTERNAL MEDICINE

## 2024-07-17 NOTE — PROGRESS NOTES
Sleep Consultation   Domitila Landers 28 y.o. female MRN: [id number]      Reason for consultation: Obstructive sleep apnea    Requesting physician: Angie Dickens MD Bariatrics    Assessment/Plan    1.  Moderate obstructive sleep apnea  HST CORDELL 26.2 in October 2021 at a weight of 303 pounds  Body mass index is 52.61 kg/m²., Neck Circumference: 16.      S/s: Snoring, witnessed apneas, excessive daytime sleepiness  [place] score: 12    I discussed in depth the diagnostic studies and treatment options involved with obstructive sleep apnea  I also discussed in depth the risk of leaving sleep apnea untreated including hypertension, heart failure, arrhythmia, MI and stroke.  The patient is agreeable to undergo testing and treatment of obstructive sleep apnea.  He/she understands the pitfalls he/she may encounter along the way and is willing to attempt CPAP treatment.     Plan  Ordered home sleep study  Patient is amenable to CPAP  She does not feel she would be able to sleep and an in lab study, avoided split study for this reason    2.  Obesity  Counseled patient on lifestyle modifications including diet and exercise  Following with bariatrics and is considering bariatric surgery  She has not started Wegovy yet  I explained that weight loss will decrease the severity of sleep apnea    3.  Excessive daytime sleepiness  [place] score of 12  I will monitor for improvement with treatment    History of Present Illness   HPI:  Domitila Landers is a 28 y.o. female with PMHx bipolar disorder, hyperlipidemia, obesity who presents for evaluation of obstructive sleep apnea.    She reports loud snoring and witnessed apneas.  She is tired and sleepy throughout the day with an Holyoke score of 12.  She previously had a home study in 2021 which showed moderate obstructive sleep apnea.  Patient states that she slept poorly during the study.  She goes to bed at 11 PM and falls asleep within 30 minutes.  She wakes up at 8 AM.  She has  3-4 nighttime awakenings to use the bathroom.  She takes a nap 1-2 times a week at 1 PM.  She sleeps for 2 to 3 hours.  She talks in her sleep and has frequent nightmares.  She tosses and turns at night.  She occasionally drinks coffee and soda.  She does not smoke or drink alcohol.  She takes 24 mg of melatonin nightly.        Review of Systems      Genitourinary none   Cardiology none   Gastrointestinal none   Neurology none   Constitutional none   Integumentary none   Psychiatry none   Musculoskeletal none   Pulmonary none   ENT none   Endocrine none   Hematological none         Historical Information   Past Medical History:   Diagnosis Date    Bipolar disorder (HCC)     Chronic kidney disease     Depression     Fatty liver     Hyperlipidemia 1/13/2020    Self-injurious behavior     Suicide attempt (HCC)     2016     Past Surgical History:   Procedure Laterality Date    NO PAST SURGERIES       Family History   Problem Relation Age of Onset    Cervical cancer Mother     Heart attack Mother     Anxiety disorder Mother     Depression Mother     Diverticulitis Mother     Heart attack Father     Hypertension Father     Hyperlipidemia Father     No Known Problems Brother     Dementia Maternal Grandmother     Alzheimer's disease Maternal Grandmother     Throat cancer Paternal Grandfather     Schizophrenia Maternal Aunt      Social History     Socioeconomic History    Marital status: /Civil Union     Spouse name: Not on file    Number of children: Not on file    Years of education: Not on file    Highest education level: Not on file   Occupational History    Occupation: Unemployed   Tobacco Use    Smoking status: Never    Smokeless tobacco: Never   Vaping Use    Vaping status: Never Used   Substance and Sexual Activity    Alcohol use: Yes     Comment: 1-2 mixed drinks once monthly or less    Drug use: Never    Sexual activity: Yes     Partners: Male     Birth control/protection: I.U.D.   Other Topics Concern     Not on file   Social History Narrative    Not on file     Social Determinants of Health     Financial Resource Strain: Not on file   Food Insecurity: Not on file   Transportation Needs: Not on file   Physical Activity: Not on file   Stress: Not on file   Social Connections: Not on file   Intimate Partner Violence: Not on file   Housing Stability: Not on file       Occupational History: Not applicable    Meds/Allergies   No Known Allergies    Home medications:  Prior to Admission medications    Medication Sig Start Date End Date Taking? Authorizing Provider   albuterol (ProAir HFA) 90 mcg/act inhaler Inhale 2 puffs every 4 (four) hours as needed for wheezing or shortness of breath 1/11/22  Yes Ru Soliman PA-C   benztropine (COGENTIN) 0.5 mg tablet Take 1 tablet (0.5 mg total) by mouth daily at bedtime as needed for tremors (or jerking) 5/10/21  Yes Cecy Stephen PA-C   clonazePAM (KlonoPIN) 0.5 mg tablet Take 0.5 tablets (0.25 mg total) by mouth 2 (two) times a day as needed for anxiety 5/10/21  Yes Cecy Stephen PA-C   ezetimibe (ZETIA) 10 mg tablet Take 1 tablet (10 mg total) by mouth daily 5/29/24 5/24/25 Yes Maddie Pineda MD   hydrocortisone 2.5 % cream Apply topically 2 (two) times a day 4/15/24  Yes Maddie Pineda MD   ketoconazole (NIZORAL) 2 % shampoo APPLY 1 APPLICATION TOPICALLY 2 (TWO) TIMES A WEEK 6/13/24  Yes Maddie Pineda MD   lamoTRIgine (LaMICtal) 100 mg tablet TAKE ONE TABLET BY MOUTH EVERY 24 HRS 12/1/21  Yes Historical Provider, MD   PARoxetine (PAXIL) 40 MG tablet TAKE 1 TABLET BY MOUTH EVERY DAY 6/7/24  Yes Maddie Pineda MD   QUEtiapine (SEROquel) 100 mg tablet Take 1/2 tab po qAM and 1 and 1/2 tabs po qhs 5/10/21  Yes Cecy Stephen PA-C   Semaglutide-Weight Management (WEGOVY) 0.25 MG/0.5ML Inject 0.5 mL (0.25 mg total) under the skin once a week 5/8/24  Yes Nara Pettit MD   atorvastatin (LIPITOR) 40 mg tablet Take 1 tablet (40 mg total) by mouth daily at  "bedtime 4/29/21   Cinthya Barros MD       Vitals:   Blood pressure 110/62, pulse 95, height 5' 3\" (1.6 m), weight 135 kg (297 lb), SpO2 98%.,  Body mass index is 52.61 kg/m².  Neck Circumference: 16    Physical Exam  General: Awake alert and oriented x 3, conversant without conversational dyspnea, NAD, normal affect  HEENT:  PERRL, Sclera noninjected, nonicteric OU, Nares patent,  no craniofacial abnormalities, Mucous membranes, moist, no oral lesions, normal dentition, Mallampati class 4  NECK:  Trachea midline, no accessory muscle use, no stridor, no cervical or supraclavicular adenopathy, JVP not elevated  CARDIAC: Reg, single s1/S2, no m/r/g  PULM: CTA bilaterally no wheezing, rhonchi or rales  EXT: No cyanosis, no clubbing, no edema, normal capillary refill  NEURO: no focal neurologic deficits, AAOx3, moving all extremities appropriately    Labs: I have personally reviewed pertinent lab results.  Lab Results   Component Value Date    WBC 14.26 (H) 10/30/2023    HGB 13.5 10/30/2023    HCT 41.7 10/30/2023    MCV 86 10/30/2023     (H) 10/30/2023      Lab Results   Component Value Date    CALCIUM 9.3 05/22/2024    K 5.2 05/22/2024    CO2 30 05/22/2024    CL 98 05/22/2024    BUN 11 05/22/2024    CREATININE 0.63 05/22/2024     No results found for: \"IRON\", \"TIBC\", \"FERRITIN\"  No results found for: \"CSORGHHN79\"  No results found for: \"FOLATE\"      Arterial Blood Gas result:  LISA Pizano MD  [facility] Sleep Medicine   "

## 2024-07-18 ENCOUNTER — HOSPITAL ENCOUNTER (OUTPATIENT)
Dept: RADIOLOGY | Facility: MEDICAL CENTER | Age: 29
Discharge: HOME | End: 2024-07-18
Payer: COMMERCIAL

## 2024-07-18 DIAGNOSIS — R79.89 ELEVATED LFTS: ICD-10-CM

## 2024-07-18 PROCEDURE — 76705 ECHO EXAM OF ABDOMEN: CPT

## 2024-07-23 ENCOUNTER — OFFICE VISIT (OUTPATIENT)
Dept: CARDIOLOGY CLINIC | Facility: MEDICAL CENTER | Age: 29
End: 2024-07-23
Payer: COMMERCIAL

## 2024-07-23 VITALS
HEART RATE: 92 BPM | DIASTOLIC BLOOD PRESSURE: 76 MMHG | WEIGHT: 293 LBS | OXYGEN SATURATION: 96 % | HEIGHT: 63 IN | SYSTOLIC BLOOD PRESSURE: 122 MMHG | BODY MASS INDEX: 51.91 KG/M2

## 2024-07-23 DIAGNOSIS — Z98.84 BARIATRIC SURGERY STATUS: ICD-10-CM

## 2024-07-23 DIAGNOSIS — R00.0 SINUS TACHYCARDIA: ICD-10-CM

## 2024-07-23 DIAGNOSIS — E78.2 MIXED HYPERLIPIDEMIA: Primary | ICD-10-CM

## 2024-07-23 PROCEDURE — 99203 OFFICE O/P NEW LOW 30 MIN: CPT | Performed by: INTERNAL MEDICINE

## 2024-07-23 NOTE — PROGRESS NOTES
Cardiology Follow Up    Domitila Landers  1995  [id number]  [facility]  [address] [facility]  [place] [id number] [phone]    1. Mixed hyperlipidemia  2. Bariatric surgery status  -     Ambulatory referral to Cardiology  3. Sinus tachycardia      Discussion: No history or symptoms suggestive of significant heart disease.  No chest discomfort or dyspnea with exertion.  No history of diabetes or smoking.  There is a history of dyslipidemia due to marked hypertriglyceridemia.  Blood pressure is normal.  There are no cardiac contraindications to anesthesia or surgery.    Cardiovascular History:   Ms. Landers is preop bariatric surgery.  There is no history or symptoms suggestive of heart disease.  ECG has been normal in the past.  Vital signs were stable at the time of her 7/24 cardiology visit.  There is a history of hypertriglyceridemia.  There is no history of hypertension, diabetes, or smoking.  There are no cardiac contraindications to anesthesia or surgery.    Patient Active Problem List   Diagnosis    Bipolar affective disorder, current episode depressed ([facility])    Sinus tachycardia    Mixed hyperlipidemia    Ganglion cyst of dorsum of left wrist    SUSANNA (generalized anxiety disorder)    PTSD (post-traumatic stress disorder)    Mixed obsessional thoughts and acts    Subclinical hypothyroidism    Leukocytosis    Elevated alkaline phosphatase level    Apneic episode    Thrombocytosis    Bereavement    Elevated serum GGT level    At risk for sleep apnea    Elevated systolic blood pressure reading without diagnosis of hypertension    Morbid obesity with BMI of 50.0-59.9, adult ([facility])    Obstructive sleep apnea    Annual physical exam    Morbid (severe) obesity due to excess calories ([facility])    Class 3 severe obesity due to excess calories with serious comorbidity and body mass index (BMI) of 50.0 to 59.9 in adult ([facility])     Past  Medical History:   Diagnosis Date    Bipolar disorder (HCC)     Chronic kidney disease     Depression     Fatty liver     Hyperlipidemia 1/13/2020    Self-injurious behavior     Suicide attempt (HCC)     2016     Social History     Socioeconomic History    Marital status: /Civil Union     Spouse name: Not on file    Number of children: Not on file    Years of education: Not on file    Highest education level: Not on file   Occupational History    Occupation: Unemployed   Tobacco Use    Smoking status: Never    Smokeless tobacco: Never   Vaping Use    Vaping status: Never Used   Substance and Sexual Activity    Alcohol use: Yes     Comment: 1-2 mixed drinks once monthly or less    Drug use: Never    Sexual activity: Yes     Partners: Male     Birth control/protection: I.U.D.   Other Topics Concern    Not on file   Social History Narrative    Not on file     Social Determinants of Health     Financial Resource Strain: Not on file   Food Insecurity: Not on file   Transportation Needs: Not on file   Physical Activity: Not on file   Stress: Not on file   Social Connections: Not on file   Intimate Partner Violence: Not on file   Housing Stability: Not on file      Family History   Problem Relation Age of Onset    Cervical cancer Mother     Heart attack Mother     Anxiety disorder Mother     Depression Mother     Diverticulitis Mother     Heart attack Father     Hypertension Father     Hyperlipidemia Father     No Known Problems Brother     Dementia Maternal Grandmother     Alzheimer's disease Maternal Grandmother     Throat cancer Paternal Grandfather     Schizophrenia Maternal Aunt      Past Surgical History:   Procedure Laterality Date    NO PAST SURGERIES         Current Outpatient Medications:     albuterol (ProAir HFA) 90 mcg/act inhaler, Inhale 2 puffs every 4 (four) hours as needed for wheezing or shortness of breath, Disp: 8.5 g, Rfl: 0    benztropine (COGENTIN) 0.5 mg tablet, Take 1 tablet (0.5 mg total)  by mouth daily at bedtime as needed for tremors (or jerking), Disp: 30 tablet, Rfl: 2    clonazePAM (KlonoPIN) 0.5 mg tablet, Take 0.5 tablets (0.25 mg total) by mouth 2 (two) times a day as needed for anxiety, Disp: 30 tablet, Rfl: 2    ezetimibe (ZETIA) 10 mg tablet, Take 1 tablet (10 mg total) by mouth daily, Disp: 90 tablet, Rfl: 3    hydrocortisone 2.5 % cream, Apply topically 2 (two) times a day, Disp: 20 g, Rfl: 1    ketoconazole (NIZORAL) 2 % shampoo, APPLY 1 APPLICATION TOPICALLY 2 (TWO) TIMES A WEEK, Disp: 120 mL, Rfl: 1    lamoTRIgine (LaMICtal) 100 mg tablet, TAKE ONE TABLET BY MOUTH EVERY 24 HRS, Disp: , Rfl:     PARoxetine (PAXIL) 40 MG tablet, TAKE 1 TABLET BY MOUTH EVERY DAY, Disp: 90 tablet, Rfl: 1    QUEtiapine (SEROquel) 100 mg tablet, Take 1/2 tab po qAM and 1 and 1/2 tabs po qhs, Disp: 75 tablet, Rfl: 2    Semaglutide-Weight Management (WEGOVY) 0.25 MG/0.5ML, Inject 0.5 mL (0.25 mg total) under the skin once a week, Disp: 2 mL, Rfl: 0    atorvastatin (LIPITOR) 40 mg tablet, Take 1 tablet (40 mg total) by mouth daily at bedtime, Disp: 90 tablet, Rfl: 4  No Known Allergies    Labs: personally reviewed all pertinent labs  Imaging:  personally reviewed all pertinent imaging  Cath:  ECHO:  Stress:  Holter:    Review of Systems:  Review of Systems   Constitutional: Negative.   HENT: Negative.     Eyes: Negative.    Cardiovascular: Negative.    Respiratory: Negative.     Endocrine: Negative.    Hematologic/Lymphatic: Negative.    Skin: Negative.    Musculoskeletal: Negative.    Gastrointestinal: Negative.    Genitourinary: Negative.    Neurological: Negative.    Psychiatric/Behavioral: Negative.     Allergic/Immunologic: Negative.    All other systems reviewed and are negative.      Vitals:    07/23/24 0936   BP: 122/76   Pulse: 92   SpO2: 96%     Weight (last 2 days)       Date/Time Weight    07/23/24 0936 134 (296)            Physical Exam:  Physical Exam  Vitals reviewed.   Constitutional:        General: She is not in acute distress.     Appearance: She is well-developed. She is not diaphoretic.   HENT:      Head: Normocephalic and atraumatic.   Eyes:      General: No scleral icterus.     Conjunctiva/sclera: Conjunctivae normal.   Neck:      Vascular: No JVD.      Trachea: No tracheal deviation.   Cardiovascular:      Rate and Rhythm: Normal rate and regular rhythm.      Pulses: Intact distal pulses.      Heart sounds: Normal heart sounds. No murmur heard.     No friction rub. No gallop.   Pulmonary:      Effort: Pulmonary effort is normal. No respiratory distress.      Breath sounds: Normal breath sounds. No stridor. No wheezing or rales.   Chest:      Chest wall: No tenderness.   Abdominal:      General: Bowel sounds are normal. There is no distension.      Palpations: Abdomen is soft.      Tenderness: There is no abdominal tenderness.   Musculoskeletal:         General: No tenderness. Normal range of motion.      Cervical back: Normal range of motion and neck supple.   Skin:     General: Skin is warm and dry.      Findings: No erythema.   Neurological:      Mental Status: She is alert and oriented to person, place, and time.      Cranial Nerves: No cranial nerve deficit.      Coordination: Coordination normal.   Psychiatric:         Behavior: Behavior normal.         Thought Content: Thought content normal.         Judgment: Judgment normal.         Dilip Salazar MD

## 2024-07-30 ENCOUNTER — TELEPHONE (OUTPATIENT)
Age: 29
End: 2024-07-30

## 2024-07-30 NOTE — TELEPHONE ENCOUNTER
Patient of . Calling to confirm her two month  Medical follow up appointment scheduled for tomorrow, 7/31/24 at 11:30am.

## 2024-07-31 ENCOUNTER — OFFICE VISIT (OUTPATIENT)
Dept: BARIATRICS | Facility: CLINIC | Age: 29
End: 2024-07-31
Payer: COMMERCIAL

## 2024-07-31 VITALS
DIASTOLIC BLOOD PRESSURE: 80 MMHG | WEIGHT: 293 LBS | HEART RATE: 68 BPM | SYSTOLIC BLOOD PRESSURE: 124 MMHG | HEIGHT: 63 IN | RESPIRATION RATE: 14 BRPM | BODY MASS INDEX: 51.91 KG/M2

## 2024-07-31 DIAGNOSIS — E66.01 MORBID OBESITY WITH BMI OF 50.0-59.9, ADULT (HCC): Primary | ICD-10-CM

## 2024-07-31 DIAGNOSIS — E78.5 DYSLIPIDEMIA: ICD-10-CM

## 2024-07-31 DIAGNOSIS — E03.8 SUBCLINICAL HYPOTHYROIDISM: ICD-10-CM

## 2024-07-31 DIAGNOSIS — G47.33 OBSTRUCTIVE SLEEP APNEA: ICD-10-CM

## 2024-07-31 DIAGNOSIS — R73.03 PREDIABETES: ICD-10-CM

## 2024-07-31 DIAGNOSIS — E78.2 MIXED HYPERLIPIDEMIA: ICD-10-CM

## 2024-07-31 PROCEDURE — 99214 OFFICE O/P EST MOD 30 MIN: CPT | Performed by: FAMILY MEDICINE

## 2024-07-31 NOTE — PROGRESS NOTES
Assessment/Plan:  Diagnoses and all orders for this visit:    Morbid obesity with BMI of 50.0-59.9, adult (HCC)  -     Semaglutide-Weight Management (WEGOVY) 0.25 MG/0.5ML; Inject 0.5 mL (0.25 mg total) under the skin once a week  Will try restart the PA process for Wegovy   She lost weight on her own so far making dietary changes and doing much better with movement  Has CV risk factors dyslipidemia, BARRERA , prediabetes  Has started bariatric surgical program  Will f/u with surgery  Obstructive sleep apnea  -     Semaglutide-Weight Management (WEGOVY) 0.25 MG/0.5ML; Inject 0.5 mL (0.25 mg total) under the skin once a week    Subclinical hypothyroidism  Normal thyroid fc  Prediabetes  -     Semaglutide-Weight Management (WEGOVY) 0.25 MG/0.5ML; Inject 0.5 mL (0.25 mg total) under the skin once a week    Dyslipidemia  -     Semaglutide-Weight Management (WEGOVY) 0.25 MG/0.5ML; Inject 0.5 mL (0.25 mg total) under the skin once a week       She has been trough a lot of trauma, mom was not supportive and she had developed emotional trauma and eating disorder , anorexia and was making herself vomit   Trying to be better now, looking for a psychiatrist to continue her current medication combo   Contraception discussed and she has an IUD but was removed but she is not sexually active now  Advised her to discuss contraception with gyn   - Patient counselled about the potential side effects of this medication that are listed by the drug .Patient denies personal history of pancreatitis. Patient also denies personal and family history of medullary thyroid cancer and multiple endocrine neoplasia type 2 (MEN 2 tumor). Contraception methods needed and encouraged, risk for  fetal harm discussed.   Patient counselled to view the website for this medicine and read detailed instructions and side effects. Patient was given opportunity to ask questions and all questions were answered.  Patient confirms understanding and agrees  to use the medication as prescribed.    CT 2017 LIVER/BILIARY TREE:  Liver is enlarged measuring 17.9cm craniocaudal.  No focal hepatic lesions are noted.  Hepatic contours are within normal limits.  No biliary dilatation.  Though slightly hypoattenuating, the liver does not meet criteria for   steatosis on this enhanced C.   Return  with weight management and bariatric surgeon    Subjective:   Chief Complaint   Patient presents with    Weight Re-gain             Patient ID: Domitila Landers  is a 28 y.o. female with excess weight/obesity here to pursue weight management.  Previous notes and records have been reviewed.        HPI  Wt Readings from Last 20 Encounters:   07/31/24 134 kg (295 lb)   07/23/24 134 kg (296 lb)   07/17/24 135 kg (297 lb)   07/15/24 135 kg (296 lb 12.8 oz)   07/15/24 135 kg (296 lb 12.8 oz)   05/28/24 (!) 138 kg (305 lb)   05/20/24 (!) 138 kg (304 lb 3.2 oz)   05/08/24 (!) 139 kg (305 lb 9.6 oz)   04/15/24 (!) 139 kg (306 lb)   10/30/23 136 kg (300 lb)   03/23/22 122 kg (270 lb)   01/11/22 127 kg (280 lb)   04/29/21 (!) 138 kg (304 lb)   11/16/20 129 kg (285 lb)   10/19/20 128 kg (282 lb)   03/14/20 119 kg (261 lb 7.5 oz)   03/10/20 122 kg (269 lb 6.4 oz)   03/05/20 120 kg (264 lb)   01/29/20 119 kg (263 lb)   01/13/20 122 kg (269 lb)   Initial weight 305lbs  Current 295lbs  Not able to obtain Wegovy yet  Weight gain on Paxil had it for 8 years   Has bipolar disorder and tries to stick to a schedule  for herself   Better food choices met dietician  Hydration: water or sprite once in a while  Alcohol: no  Smoking:no  Exercise: walks around the store monitor her steps  Occupation:not working, lives with her    Sleep:has to take melatonin  STOP bang:was dg with BARRERA does not have  machine    Past Medical History:   Diagnosis Date    Bipolar disorder (HCC)     Chronic kidney disease     Depression     Fatty liver     Hyperlipidemia 1/13/2020    Self-injurious behavior     Suicide attempt  "([facility])     2016     Past Surgical History:   Procedure Laterality Date    NO PAST SURGERIES       The following portions of the patient's history were reviewed and updated as appropriate: allergies, current medications, past family history, past medical history, past social history, past surgical history, and problem list.    ROS:  Review of Systems   Constitutional: Negative for activity change. Fatigue  Gastrointestinal: Negative for abdominal pain, nausea and vomiting,+ acid reflux, constipation/diarrhea  Psychiatric/Behavioral: controlled  anxiety /depression  Objective:  /80 (BP Location: Left arm, Patient Position: Sitting, Cuff Size: Large)   Pulse 68   Resp 14   Ht 5' 3\" (1.6 m)   Wt 134 kg (295 lb)   BMI 52.26 kg/m²   Constitutional: Well-developed, well-.nourished    HEENT: No conjunctival pallor or jaundice  Pulmonary: No increased work of breathing or signs of respiratory distress.  CV: Well-perfused, Normal rate    Vascular: no peripheral edema  GI: Abdomen obese, Non-distended  MSK: no sarcopenia noted   Neuro: Oriented to person, place and time. Normal Speech  Psych: Normal affect and mood. Normal thought process, no delusions      Labs and Imaging  Recent labs and imaging have been personally reviewed.  Lab Results   Component Value Date    WBC 14.26 (H) 10/30/2023    HGB 13.5 10/30/2023    HCT 41.7 10/30/2023    MCV 86 10/30/2023     (H) 10/30/2023     Lab Results   Component Value Date    SODIUM 137 05/22/2024    K 5.2 05/22/2024    CL 98 05/22/2024    CO2 30 05/22/2024    AGAP 9 05/22/2024    BUN 11 05/22/2024    CREATININE 0.63 05/22/2024    GLUC 118 10/30/2023    GLUF 92 05/22/2024    CALCIUM 9.3 05/22/2024    AST 22 05/22/2024    ALT 31 05/22/2024    ALKPHOS 116 (H) 05/22/2024    TP 7.1 05/22/2024    TBILI 0.40 05/22/2024    EGFR 122 05/22/2024     Lab Results   Component Value Date    HGBA1C 6.1 (H) 05/22/2024     Lab Results   Component Value Date    LNB3FHCRNYTR 3.351 " 05/22/2024    TSH 3.14 01/11/2019     Lab Results   Component Value Date    CHOLESTEROL 230 (H) 05/22/2024     Lab Results   Component Value Date    HDL 32 (L) 05/22/2024     Lab Results   Component Value Date    TRIG 443 (H) 05/22/2024     Lab Results   Component Value Date    LDLCALC  05/22/2024      Comment:      Calculated LDL invalid, triglycerides >400 mg/dl  This screening LDL is a calculated result.   It does not have the accuracy of the Direct Measured LDL in the monitoring of patients with hyperlipidemia and/or statin therapy.   Direct Measure LDL (KGT690) must be ordered separately in these patients.

## 2024-08-07 ENCOUNTER — HOSPITAL ENCOUNTER (OUTPATIENT)
Dept: SLEEP CENTER | Facility: CLINIC | Age: 29
Discharge: HOME | End: 2024-08-07
Payer: COMMERCIAL

## 2024-08-07 DIAGNOSIS — G47.19 EXCESSIVE DAYTIME SLEEPINESS: ICD-10-CM

## 2024-08-07 PROCEDURE — G0399 HOME SLEEP TEST/TYPE 3 PORTA: HCPCS

## 2024-08-08 NOTE — PROGRESS NOTES
Home Sleep Study Documentation    HOME STUDY DEVICE: Noxturnal no                                           Cordelia G3 yes      Pre-Sleep Home Study:    Set-up and instructions performed by: Yazan Manriquez    Technician performed demonstration for Patient: yes    Return demonstration performed by Patient: yes    Written instructions provided to Patient: yes    Patient signed consent form: yes        Post-Sleep Home Study:    Additional comments by Patient: pending    Home Sleep Study Failed:pending    Failure reason: pending    Reported or Detected: pending    Scored by: pending

## 2024-08-12 ENCOUNTER — APPOINTMENT (OUTPATIENT)
Dept: LAB | Facility: MEDICAL CENTER | Age: 29
End: 2024-08-12

## 2024-08-12 DIAGNOSIS — G47.33 OSA (OBSTRUCTIVE SLEEP APNEA): Primary | ICD-10-CM

## 2024-08-12 DIAGNOSIS — Z00.6 ENCOUNTER FOR EXAMINATION FOR NORMAL COMPARISON OR CONTROL IN CLINICAL RESEARCH PROGRAM: ICD-10-CM

## 2024-08-12 PROCEDURE — 36415 COLL VENOUS BLD VENIPUNCTURE: CPT

## 2024-08-12 PROCEDURE — 95806 SLEEP STUDY UNATT&RESP EFFT: CPT | Performed by: INTERNAL MEDICINE

## 2024-08-14 ENCOUNTER — OFFICE VISIT (OUTPATIENT)
Dept: FAMILY MEDICINE CLINIC | Facility: CLINIC | Age: 29
End: 2024-08-14
Payer: COMMERCIAL

## 2024-08-14 VITALS
HEART RATE: 84 BPM | TEMPERATURE: 97.8 F | HEIGHT: 63 IN | SYSTOLIC BLOOD PRESSURE: 112 MMHG | DIASTOLIC BLOOD PRESSURE: 74 MMHG | RESPIRATION RATE: 16 BRPM | BODY MASS INDEX: 51.91 KG/M2 | WEIGHT: 293 LBS | OXYGEN SATURATION: 97 %

## 2024-08-14 DIAGNOSIS — G47.33 OSA (OBSTRUCTIVE SLEEP APNEA): ICD-10-CM

## 2024-08-14 DIAGNOSIS — F31.30 BIPOLAR AFFECTIVE DISORDER, CURRENT EPISODE DEPRESSED, CURRENT EPISODE SEVERITY UNSPECIFIED (HCC): ICD-10-CM

## 2024-08-14 DIAGNOSIS — E78.2 MIXED HYPERLIPIDEMIA: Primary | ICD-10-CM

## 2024-08-14 DIAGNOSIS — E66.01 CLASS 3 SEVERE OBESITY DUE TO EXCESS CALORIES WITH SERIOUS COMORBIDITY AND BODY MASS INDEX (BMI) OF 50.0 TO 59.9 IN ADULT (HCC): ICD-10-CM

## 2024-08-14 DIAGNOSIS — F43.10 PTSD (POST-TRAUMATIC STRESS DISORDER): ICD-10-CM

## 2024-08-14 DIAGNOSIS — F42.2 MIXED OBSESSIONAL THOUGHTS AND ACTS: ICD-10-CM

## 2024-08-14 DIAGNOSIS — E66.01 MORBID OBESITY WITH BMI OF 50.0-59.9, ADULT (HCC): ICD-10-CM

## 2024-08-14 DIAGNOSIS — F41.1 GAD (GENERALIZED ANXIETY DISORDER): ICD-10-CM

## 2024-08-14 PROBLEM — E66.813 CLASS 3 SEVERE OBESITY DUE TO EXCESS CALORIES WITH SERIOUS COMORBIDITY AND BODY MASS INDEX (BMI) OF 50.0 TO 59.9 IN ADULT (HCC): Status: ACTIVE | Noted: 2021-04-29

## 2024-08-14 PROBLEM — R03.0 ELEVATED SYSTOLIC BLOOD PRESSURE READING WITHOUT DIAGNOSIS OF HYPERTENSION: Status: RESOLVED | Noted: 2021-04-29 | Resolved: 2024-08-14

## 2024-08-14 PROBLEM — D72.829 LEUKOCYTOSIS: Status: RESOLVED | Noted: 2020-05-01 | Resolved: 2024-08-14

## 2024-08-14 PROBLEM — Z00.00 ANNUAL PHYSICAL EXAM: Status: RESOLVED | Noted: 2024-04-15 | Resolved: 2024-08-14

## 2024-08-14 PROBLEM — Z63.4 BEREAVEMENT: Status: RESOLVED | Noted: 2020-05-15 | Resolved: 2024-08-14

## 2024-08-14 PROBLEM — Z91.89 AT RISK FOR SLEEP APNEA: Status: RESOLVED | Noted: 2021-04-29 | Resolved: 2024-08-14

## 2024-08-14 PROCEDURE — 99214 OFFICE O/P EST MOD 30 MIN: CPT | Performed by: FAMILY MEDICINE

## 2024-08-14 RX ORDER — OMEGA-3-ACID ETHYL ESTERS 1 G/1
2 CAPSULE, LIQUID FILLED ORAL 2 TIMES DAILY
Qty: 360 CAPSULE | Refills: 3 | Status: SHIPPED | OUTPATIENT
Start: 2024-08-14

## 2024-08-14 RX ORDER — PAROXETINE 40 MG/1
40 TABLET, FILM COATED ORAL DAILY
Qty: 90 TABLET | Refills: 1 | Status: SHIPPED | OUTPATIENT
Start: 2024-08-14

## 2024-08-14 RX ORDER — QUETIAPINE FUMARATE 100 MG/1
TABLET, FILM COATED ORAL
Qty: 75 TABLET | Refills: 2 | Status: SHIPPED | OUTPATIENT
Start: 2024-08-14

## 2024-08-14 NOTE — ASSESSMENT & PLAN NOTE
Reviewed home sleep study with patient.  Mild sleep apnea.  Follow-up with sleep medicine in October.  Continue following with weight management plan to restart Seroquel

## 2024-08-14 NOTE — ASSESSMENT & PLAN NOTE
Medication refilled.  Has been on Paxil for over 8 years.  Continue Seroquel and Lamictal.  Referral to psychiatry currently in the system.  Awaiting appointment.

## 2024-08-14 NOTE — ASSESSMENT & PLAN NOTE
Elevated cholesterol and triglycerides.  Stop ezetimibe.  She would benefit more from Lovaza for hypertriglyceridemia.  Medication ordered and sent to the pharmacy.

## 2024-08-14 NOTE — PROGRESS NOTES
Ambulatory Visit  Name: Domitila Landers      : 1995      MRN: [id number]  Encounter Provider: Jose Wallace MD  Encounter Date: 2024   Encounter department: [facility]    Assessment & Plan   1. Mixed hyperlipidemia  Assessment & Plan:  Elevated cholesterol and triglycerides.  Stop ezetimibe.  She would benefit more from Lovaza for hypertriglyceridemia.  Medication ordered and sent to the pharmacy.  Orders:  -     omega-3-acid ethyl esters (LOVAZA) 1 g capsule; Take 2 capsules (2 g total) by mouth 2 (two) times a day  2. Mixed obsessional thoughts and acts  -     PARoxetine (PAXIL) 40 MG tablet; Take 1 tablet (40 mg total) by mouth daily  3. SUSANNA (generalized anxiety disorder)  -     PARoxetine (PAXIL) 40 MG tablet; Take 1 tablet (40 mg total) by mouth daily  4. PTSD (post-traumatic stress disorder)  Assessment & Plan:  Medication refilled.  Has been on Paxil for over 8 years.  Continue Seroquel and Lamictal.  Referral to psychiatry currently in the system.  Awaiting appointment.  Orders:  -     PARoxetine (PAXIL) 40 MG tablet; Take 1 tablet (40 mg total) by mouth daily  5. Bipolar affective disorder, current episode depressed, current episode severity unspecified (HCC)  -     QUEtiapine (SEROquel) 100 mg tablet; Take 1/2 tab po qAM and 1 and 1/2 tabs po qhs  6. BARRERA (obstructive sleep apnea)  Assessment & Plan:  Reviewed home sleep study with patient.  Mild sleep apnea.  Follow-up with sleep medicine in October.  Continue following with weight management plan to restart Seroquel  7. Morbid obesity with BMI of 50.0-59.9, adult ([facility])  8. Class 3 severe obesity due to excess calories with serious comorbidity and body mass index (BMI) of 50.0 to 59.9 in adult ([facility])    Reviewed labs, home sleep study and previous notes.    Tobacco Cessation Counseling: The patient is sincerely urged to quit consumption of tobacco. She is ready to quit tobacco.         History of Present Illness      Patient presents today for medication refills.  She is currently on Seroquel Paxil and cholesterol medication. Planning for weight loss surgery.  Has an appointment tomorrow with weight management for preop.  Patient believes her surgical be scheduled for September.  She is not currently taking Wegovy.  Medication refills listed above      Review of Systems   Constitutional:  Negative for activity change, fatigue and fever.   Eyes:  Negative for visual disturbance.   Respiratory:  Negative for shortness of breath.    Cardiovascular:  Negative for chest pain.   Gastrointestinal:  Negative for abdominal pain, constipation, diarrhea and nausea.   Endocrine: Negative for cold intolerance and heat intolerance.   Musculoskeletal:  Negative for back pain.   Skin:  Negative for rash.   Neurological:  Negative for headaches.   Psychiatric/Behavioral:  Negative for confusion.      Past Medical History:   Diagnosis Date    Anxiety 2016    Bipolar disorder (HCC)     Chronic kidney disease     Depression     Fatty liver     Hyperlipidemia 01/13/2020    Kidney stone 2014    Leukocytosis 05/01/2020    Obesity     Self-injurious behavior     Suicide attempt (HCC)     2016     Past Surgical History:   Procedure Laterality Date    NO PAST SURGERIES       Family History   Problem Relation Age of Onset    Cervical cancer Mother     Heart attack Mother     Anxiety disorder Mother     Depression Mother     Diverticulitis Mother     Arthritis Mother     Heart attack Father     Hypertension Father     Hyperlipidemia Father     No Known Problems Brother     Dementia Maternal Grandmother     Alzheimer's disease Maternal Grandmother     Throat cancer Paternal Grandfather     Cancer Paternal Grandfather     Schizophrenia Maternal Aunt      Social History     Tobacco Use    Smoking status: Never    Smokeless tobacco: Current     Types: Snuff, Chew   Vaping Use    Vaping status: Never Used   Substance and Sexual Activity    Alcohol use: Yes      Comment: 1-2 mixed drinks once monthly or less    Drug use: No    Sexual activity: Yes     Partners: Male     Birth control/protection: Abstinence, Condom Male     Current Outpatient Medications on File Prior to Visit   Medication Sig    albuterol (ProAir HFA) 90 mcg/act inhaler Inhale 2 puffs every 4 (four) hours as needed for wheezing or shortness of breath    benztropine (COGENTIN) 0.5 mg tablet Take 1 tablet (0.5 mg total) by mouth daily at bedtime as needed for tremors (or jerking)    clonazePAM (KlonoPIN) 0.5 mg tablet Take 0.5 tablets (0.25 mg total) by mouth 2 (two) times a day as needed for anxiety    hydrocortisone 2.5 % cream Apply topically 2 (two) times a day    ketoconazole (NIZORAL) 2 % shampoo APPLY 1 APPLICATION TOPICALLY 2 (TWO) TIMES A WEEK    lamoTRIgine (LaMICtal) 100 mg tablet TAKE ONE TABLET BY MOUTH EVERY 24 HRS    Semaglutide-Weight Management (WEGOVY) 0.25 MG/0.5ML Inject 0.5 mL (0.25 mg total) under the skin once a week    [DISCONTINUED] ezetimibe (ZETIA) 10 mg tablet Take 1 tablet (10 mg total) by mouth daily    [DISCONTINUED] PARoxetine (PAXIL) 40 MG tablet TAKE 1 TABLET BY MOUTH EVERY DAY    [DISCONTINUED] QUEtiapine (SEROquel) 100 mg tablet Take 1/2 tab po qAM and 1 and 1/2 tabs po qhs    [DISCONTINUED] atorvastatin (LIPITOR) 40 mg tablet Take 1 tablet (40 mg total) by mouth daily at bedtime     No Known Allergies  Immunization History   Administered Date(s) Administered    DTaP,unspecified 1995, 03/30/1996, 05/29/1996, 01/29/1997, 12/07/1999    HPV9 01/29/2020, 04/29/2021    Hep B, Adolescent or Pediatric 1995, 1995, 05/29/1996    HiB 1995, 03/20/1996, 05/29/1996, 01/29/1997    IPV 03/20/1996, 05/29/1996, 12/28/1996, 12/07/1999    MMR 10/16/1996, 12/07/1999, 07/30/2008    Tdap 01/10/2019    Varicella 03/20/1996, 05/29/1996     Objective     /74 (BP Location: Left arm, Patient Position: Sitting, Cuff Size: Large)   Pulse 84   Temp 97.8 °F (36.6 °C)  "(Temporal)   Resp 16   Ht 5' 3\" (1.6 m)   Wt 135 kg (298 lb)   SpO2 97%   BMI 52.79 kg/m²     Physical Exam  Vitals and nursing note reviewed.   Constitutional:       Appearance: She is well-developed. She is obese.   HENT:      Head: Normocephalic and atraumatic.   Cardiovascular:      Rate and Rhythm: Normal rate and regular rhythm.   Pulmonary:      Effort: Pulmonary effort is normal.      Breath sounds: Normal breath sounds.   Abdominal:      General: Bowel sounds are normal.      Palpations: Abdomen is soft.   Musculoskeletal:      Cervical back: Normal range of motion.   Skin:     General: Skin is warm.   Neurological:      General: No focal deficit present.      Mental Status: She is alert.   Psychiatric:         Mood and Affect: Mood normal.         Speech: Speech normal.         "

## 2024-08-14 NOTE — PROGRESS NOTES
Pre op. Lost about 10 lbs since starting the process. Had an NSV- had to start using a belt. Has been having 3 meals per day consistently. Drinking 3 bottles of water, 20 oz green tea, cut out soda. Pulmonologist wants her to decrease melatonin. Met with a new PCP yesterday and he gave her a new prescription for her Seroquel, which will help with her sleep- she has not had it due to both her PCP and psychiatrist leaving.their practices. Getting 5-6 hours of sleep, wakes up to go to the bathroom- stop fluids 2 hours before bed. Getting about 2k steps per day, working on increasing.  Workflow reviewed:   Psych and/or D+A Clearance: Needs psych and therapist- has appointment 9/4 with [facility] for medication management, plans on asking about a therapist at that time  PCP Letter: Referral in her chart  Support Group: No longer required, strongly encouraged  Surgeon Appt: 5/20/24  EGD: Needs to schedule once psych recieved  Cardiac Risk Assessment: completed 7/23  Sleep Studies: BARRERA+- has appointment 7/17- home study completed 8/7-mild BARRERA, CPAP ordered 8/12  Blood work: Needs to complete once psych complete  Nicotine test: N/A  Weight loss meds: N/A  Required weight checks: No weight checks required by insurance, our program requires monthly follow up  Weight Loss: Not required, encouraged positive lifestyle changes.  Adry Bernard LCSW

## 2024-08-15 ENCOUNTER — CLINICAL SUPPORT (OUTPATIENT)
Dept: BARIATRICS | Facility: CLINIC | Age: 29
End: 2024-08-15

## 2024-08-15 DIAGNOSIS — Z71.89 ENCOUNTER FOR PRE-BARIATRIC SURGERY COUNSELING AND EDUCATION: Primary | ICD-10-CM

## 2024-08-15 PROCEDURE — RECHECK

## 2024-08-21 ENCOUNTER — TELEPHONE (OUTPATIENT)
Age: 29
End: 2024-08-21

## 2024-08-21 NOTE — TELEPHONE ENCOUNTER
Spoke to patient. Patient is amendable to using [facility]. RX for CPAP and clinicals sent to [facility] via [place]

## 2024-08-21 NOTE — TELEPHONE ENCOUNTER
Attempted to call patient regarding Sleep study to discuss results and treatment options. Advised patient to give us a call back

## 2024-08-21 NOTE — TELEPHONE ENCOUNTER
Pt called neurology office return call below.  Advised that she reach the neurology office and that sleep med was trying to reach her.  I gave her sleep med phone number and also transferred her to sleep med office.

## 2024-08-22 LAB
APOB+LDLR+PCSK9 GENE MUT ANL BLD/T: NOT DETECTED
BRCA1+BRCA2 DEL+DUP + FULL MUT ANL BLD/T: NOT DETECTED
MLH1+MSH2+MSH6+PMS2 GN DEL+DUP+FUL M: NOT DETECTED

## 2024-08-26 LAB

## 2024-09-10 NOTE — PROGRESS NOTES
"Bariatric Nutrition Assessment  - PreOp Visit  Insurance: No required monthly weight checks      Type of surgery    Vertical sleeve gastrectomy  Surgery Date: TBD  Surgeon: Consult with Dr. Maninder alberts 5/20/2024      Nutrition Assessment   Domitila Pruitte Leesa  28 y.o.  female     Wt 133 kg (294 lb)   BMI 52.08 kg/m²   Initial Weight at Surgeon Consult: 304.2#   BMI: 53.9  Height: 5'3\"  Current Weight: 294#  Eval Weight: 296.8#   BMI: 52.6  Wt with BMI of 25: 141#  Pre-Op Excess Wt: 163.2#  BMI to Qualify at 35 = 197.5#  , PMH includes:  Obesity, BARRERA, PTSD, HLD, Fatty Liver.SUSANNA, BiPolar    Pt advised not to gain weight during preop process. Pt encouraged to lose weight via healthy eating and exercise. Pt may follow Liver Shrinking diet 2--4  weeks or more  prior to DOS depending on BMI at time. This diet will promote weight loss.    Carter- . Chelly Equation:    NSI=7792  Weight Maintenance 2450  Estimated calories for weight loss 7450-5057  ( 1-2# per wk wt loss - sedentary )  Estimated protein needs 64-96 g(1.0-1.5 gms/kg IBW )   Estimated fluid needs 64-75 oz (30-35 ml/kg IBW )      NAFLD Fibrosis Score: -4.49 at 5/22/2024  9:11 AM  Calculated from:  SGOT/AST: 22 U/L at 5/22/2024  9:11 AM  SGPT/ALT: 31 U/L at 5/22/2024  9:11 AM  Serum Albumin: 4.0 g/dL at 5/22/2024  9:11 AM  Platelets: 539 Thousands/uL at 10/30/2023 12:53 PM  Age: 28 years  BMI: 53.9 at 5/20/2024  8:37 AM  Weight (recorded): 137.98 kg at 5/20/2024  8:37 AM  Height: 160.00 cm at 5/20/2024  8:37 AM  Has Diabetes: No    Weight History Reason for WLS: Dad passed from massive MI - and he was overweight  Onset of Obesity: Childhood  Family history of obesity: Yes  Wt Loss Attempts: Exercise  Self Created Diets (Portion Control, Healthy Food Choices, etc.)  Mediterrain and Vegetarian  Patient has tried the above for 6 months or more with insufficient weight loss or weight regain, which is why patient has requested to be evaluated for weight loss surgery " today  Maximum Wt Lost: 20#       Review of History and Medications   OTC: None  Possible IBS - diaarhea   Past Medical History:   Diagnosis Date    Anxiety 2016    Bipolar disorder (HCC)     Chronic kidney disease     Depression     Fatty liver     Hyperlipidemia 01/13/2020    Kidney stone 2014    Leukocytosis 05/01/2020    Obesity     Self-injurious behavior     Suicide attempt (HCC)     2016     Past Surgical History:   Procedure Laterality Date    NO PAST SURGERIES       Social History     Socioeconomic History    Marital status: /Civil Union     Spouse name: Not on file    Number of children: Not on file    Years of education: Not on file    Highest education level: Not on file   Occupational History    Occupation: Unemployed   Tobacco Use    Smoking status: Never    Smokeless tobacco: Current     Types: Snuff, Chew   Vaping Use    Vaping status: Never Used   Substance and Sexual Activity    Alcohol use: Yes     Comment: 1-2 mixed drinks once monthly or less    Drug use: No    Sexual activity: Yes     Partners: Male     Birth control/protection: Abstinence, Condom Male   Other Topics Concern    Not on file   Social History Narrative    Not on file     Social Determinants of Health     Financial Resource Strain: Not on file   Food Insecurity: Not on file   Transportation Needs: Not on file   Physical Activity: Not on file   Stress: Not on file   Social Connections: Not on file   Intimate Partner Violence: Not on file   Housing Stability: Not on file       Current Outpatient Medications:     albuterol (ProAir HFA) 90 mcg/act inhaler, Inhale 2 puffs every 4 (four) hours as needed for wheezing or shortness of breath, Disp: 8.5 g, Rfl: 0    benztropine (COGENTIN) 0.5 mg tablet, Take 1 tablet (0.5 mg total) by mouth daily at bedtime as needed for tremors (or jerking), Disp: 30 tablet, Rfl: 2    clonazePAM (KlonoPIN) 0.5 mg tablet, Take 0.5 tablets (0.25 mg total) by mouth 2 (two) times a day as needed for  anxiety, Disp: 30 tablet, Rfl: 2    hydrocortisone 2.5 % cream, Apply topically 2 (two) times a day, Disp: 20 g, Rfl: 1    ketoconazole (NIZORAL) 2 % shampoo, APPLY 1 APPLICATION TOPICALLY 2 (TWO) TIMES A WEEK, Disp: 120 mL, Rfl: 1    lamoTRIgine (LaMICtal) 100 mg tablet, TAKE ONE TABLET BY MOUTH EVERY 24 HRS, Disp: , Rfl:     omega-3-acid ethyl esters (LOVAZA) 1 g capsule, Take 2 capsules (2 g total) by mouth 2 (two) times a day, Disp: 360 capsule, Rfl: 3    PARoxetine (PAXIL) 40 MG tablet, Take 1 tablet (40 mg total) by mouth daily, Disp: 90 tablet, Rfl: 1    QUEtiapine (SEROquel) 100 mg tablet, Take 1/2 tab po qAM and 1 and 1/2 tabs po qhs, Disp: 75 tablet, Rfl: 2    Semaglutide-Weight Management (WEGOVY) 0.25 MG/0.5ML, Inject 0.5 mL (0.25 mg total) under the skin once a week, Disp: 2 mL, Rfl: 0  Food Intake and Lifestyle Assessment   Food Intake Assessment completed via usual diet recall  Breakfast: Muffin bars - [place]  English Muffin - Jam  Snack: Fruit cup  Lunch: Vegetables -Corn, GB, Carrots - potatoes   Snack: fruit  Dinner: Plant based chicken nuggets or chicken, fish, rice & pasta -   Dessert pears, apples, nectarine  Beverage intake:  48 oz water, regular soda,  green tea - honey and Lactose free milk with cereal   Protein supplement: None  Portions:  5- 6 oz Protein  3/4 c Starch   1-1.5 c Vegetable    Estimated protein intake per day: 50-60 grams  Estimated fluid intake per day:  48-64 oz   Meals eaten away from home: 3-4X month  Typical meal pattern: 3 meals per day and 2-3 snacks per day  Eating Behaviors: Consumption of high calorie/ high fat foods, Consumption of high calorie beverages, Large portion sizes, and Mindless eating  Hx of Eating Disorder - Anorexia - No tx  reports  helped her  Food allergies or intolerances: No Known Allergies or intolerances: Lactose, Eggs, Ice cream - excess salads gives her diarrhea  Cultural or Presybeterian considerations: None    Physical  "Assessment  Physical Activity Not able to keep up   Takes care of 2 kittens - Has 10 yo pit mix  Types of exercise: Walking  Current physical limitations: \"mental illness\" doesn't allow her to keep up     Psychosocial Assessment   Support systems: spouse   (mom and brother not supportive)  Socioeconomic factors:   Not currently employed. She states she gained weight with medication for treating bipolar     Nutrition Diagnosis  Diagnosis: Overweight / Obesity (NC-3.3)  Related to: Physical inactivity and Excessive energy intake  As Evidenced by: BMI >25     Nutrition Prescription: Recommend the following diet  Low fat, Low sugar, High protein, and Regular    Interventions and Teaching   Discussed pre-op and post-op nutrition guidelines.       Patient educated and handouts provided.  Surgical changes to stomach / GI  Capacity of post-surgery stomach  Diet progression  Adequate hydration  Sugar and fat restriction to decrease \"dumping syndrome\"  Fat restriction to decrease steatorrhea  Expected weight loss  Weight loss plateaus/ possibility of weight regain  Exercise  Suggestions for pre-op diet  Nutrition considerations after surgery  Protein supplements  Meal planning and preparation  Appropriate carbohydrate, protein, and fat intake, and food/fluid choices to maximize safe weight loss, nutrient intake, and tolerance   Dietary and lifestyle changes  Possible problems with poor eating habits  Intuitive eating  Techniques for self monitoring and keeping daily food journal  Potential for food intolerance after surgery, and ways to deal with them including: lactose intolerance, nausea, reflux, vomiting, diarrhea, food intolerance, appetite changes, gas  Vitamin / Mineral supplementation of Multivitamin with minerals, Calcium, Vitamin B12, Iron, Fat Soluble vitamins, and Vitamin D    Patient is not currently pregnant and doesn't desire to become pregnant a minimum of one year post-op    Education provided to: " patient    Barriers to learning: No barriers identified    Readiness to change: preparation    Prior research on procedure: internet and discussed with provider  (ELVIRA has WLS in the 1990s)    Comprehension: verbalizes understanding     Expected Compliance: Fair to good  Recommendations  Pt is an appropriate candidate for surgery. N/A - Further assess - hx of eating disorders - not treated   Evaluation / Monitoring  Dietitian to Monitor: Eating pattern as discussed Tolerance of nutrition prescription Body weight Lab values Physical activity Bowel pattern    Monthly PreOp Visit Summary 9/11/2024  Started process and has lost 10#. Reports to have stopped drinking  soda - increasing water (up to 3 bottles) with flavoring Decreasing coffee - 1 cup few days per week not daily. Enjoying new foods - claudia new types of fruit.  Recall below. Advised to include protein at meals and keep to 1 SW vs 2.   Recall  B-  Tomato sandwiches - 2 (white bread, wood, salt and tomato  L- new fruit, salmon with rice  or chicken nuggets (tofu mushroom)  D [place] , rice green beans - or another tomato sandwich  H:  white nectarine  Started introducing 30/60 - finds challenging.  Eating slower - Notices now that drinking with straw making her gasier. Questions answered during discussion. Workflow updated    Workflow reviewed:   Psych and/or D+A Clearance: - 9/4  Ruben EDOUARD - [facility] -   PCP Letter: Referral in her chart  Surgeon Appt: 5/20/24  EGD: Needs to schedule once psych received - will schedule at next visit  Cardiac Risk Assessment: completed 7/23  Sleep Studies: BARRERA+- has appointment 7/17- home study completed 8/7-mild BARRERA, CPAP ordered 8/12 - -picks up CPAP on 9/13/2024  Blood work: Needs to complete - ordered 9/11/24  Nicotine test: N/A  Weight loss meds: N/A  Required weight checks: No weight checks required by insurance, our program requires monthly follow u    Goals  Eliminate sugar sweetened beverages, Food journal, Exercise 30  minutes 5 times per week, Complete lession plans 1-6, Eat 3 meals per day, and Eliminate mindless snacking  Follow Pre-Surgery guidelines  > Trial Baritastic for food logging  > Maintain regular meal pattern - include fruits, vegetables and whole grains  > Avoid skipping meals- Can use protein drink as meal replacement  > Decrease portions  > Focus on protein - include lean protein at each meal and snack - Learn to eat protein first  > Continue to limit processed foods, fast foods and dining away from home  > Include meal prepping  > Limit snacks - healthier choices and portion; avoid grazing  > Slow pace of eating and sip fluids  - practice 30/60 minute rule  > Eliminate caffeine by day of surgery  > Reduce/eliminate carbonation by pre-op diet  > Increase water intake - 64 oz  > Increase physical activity/establish exercise regimen as able  > Start multi vitamin and additional Vitamin D 2000IU  Pre-op weight loss not required but advised not to gain weight  Glycemic Control: HgA1c 6.1 on  5/22/24   F/U next month with bariatric provider        Time Spent:   30 minutes

## 2024-09-11 ENCOUNTER — CLINICAL SUPPORT (OUTPATIENT)
Dept: BARIATRICS | Facility: CLINIC | Age: 29
End: 2024-09-11

## 2024-09-11 VITALS — WEIGHT: 293 LBS | BODY MASS INDEX: 52.08 KG/M2

## 2024-09-11 DIAGNOSIS — E66.01 MORBID (SEVERE) OBESITY DUE TO EXCESS CALORIES (HCC): Primary | ICD-10-CM

## 2024-09-11 DIAGNOSIS — Z01.818 PREOP TESTING: ICD-10-CM

## 2024-09-11 DIAGNOSIS — N93.9 VAGINAL BLEEDING: Primary | ICD-10-CM

## 2024-09-11 PROCEDURE — RECHECK

## 2024-09-20 ENCOUNTER — TELEPHONE (OUTPATIENT)
Age: 29
End: 2024-09-20

## 2024-09-20 NOTE — TELEPHONE ENCOUNTER
Contacted patient off of Medication Management  to verify needs of services in attempts to offer patient an appointment. LVM for patient to contact intake dept  in regards to wait list.

## 2024-09-20 NOTE — TELEPHONE ENCOUNTER
Pt called in regarding voicemail left in previous encounter, pt stated that at this time she has found services elsewhere.     Pt removed from wait list at this time.

## 2024-09-27 ENCOUNTER — HOSPITAL ENCOUNTER (OUTPATIENT)
Dept: RADIOLOGY | Facility: MEDICAL CENTER | Age: 29
Discharge: HOME | End: 2024-09-27
Payer: COMMERCIAL

## 2024-09-27 DIAGNOSIS — N93.9 VAGINAL BLEEDING: ICD-10-CM

## 2024-09-27 PROCEDURE — 76856 US EXAM PELVIC COMPLETE: CPT

## 2024-09-27 PROCEDURE — 76830 TRANSVAGINAL US NON-OB: CPT

## 2024-10-08 NOTE — PROGRESS NOTES
"Pre op. Saw the Psychiatric NP on 9/4- switched all her psychiatric medications was on Paxil for 10 years- prescribed Lexapro and Topamax for sleep at night. Feels that she can feel her emotions now. More engaged rather than feeling like a \"zombie\". Has to call him back as he called her yesterday to discuss the paperwork that I had sent. Has been on soft food for about a week since having 3 teeth pulled last week. Has been having 3 meals per day. Trying new foods. Fitting into new clothes. Drinking 3 bottles of water with sugar free add ins, 1-2 bottles of green tea per week, 10 oz hot tea 1x per week. Getting about 2k steps per day, plans on using her treadmill. Stomach getting upset before bed- patient to see if it is something she is eating for dinner that is different.  Workflow reviewed:   Psych and/or D+A Clearance: Needs psych and therapist- LCSW faxed paperwork to psychiatrist NP, awaiting response, plans to discuss scheduling with a therapist with her psychiatrist NP, also provided resource list again  Support Group: No longer required, strongly encouraged  Surgeon Appt: 5/20/24  EGD: Needs to schedule once psych recieved  Cardiac Risk Assessment: completed 7/23  Sleep Studies: BARRERA+- home study completed 8/7-mild BARRERA  Blood work: Needs to complete   Nicotine test: N/A  Weight loss meds: N/A  Required weight checks: No weight checks required by insurance, our program requires monthly follow up  Weight Loss: Not required, encouraged positive lifestyle changes.  Adry Bernard LCSW    "

## 2024-10-10 ENCOUNTER — CLINICAL SUPPORT (OUTPATIENT)
Dept: BARIATRICS | Facility: CLINIC | Age: 29
End: 2024-10-10

## 2024-10-10 DIAGNOSIS — Z71.89 ENCOUNTER FOR PRE-BARIATRIC SURGERY COUNSELING AND EDUCATION: Primary | ICD-10-CM

## 2024-10-10 PROCEDURE — RECHECK

## 2024-10-29 ENCOUNTER — LAB (OUTPATIENT)
Dept: LAB | Facility: MEDICAL CENTER | Age: 29
End: 2024-10-29
Payer: COMMERCIAL

## 2024-10-29 DIAGNOSIS — Z01.818 PREOP TESTING: ICD-10-CM

## 2024-10-29 DIAGNOSIS — E66.01 MORBID (SEVERE) OBESITY DUE TO EXCESS CALORIES (HCC): ICD-10-CM

## 2024-10-29 DIAGNOSIS — N93.9 VAGINAL BLEEDING: ICD-10-CM

## 2024-10-29 LAB
ALBUMIN SERPL BCG-MCNC: 3.8 G/DL (ref 3.5–5)
ALP SERPL-CCNC: 108 U/L (ref 34–104)
ALT SERPL W P-5'-P-CCNC: 25 U/L (ref 7–52)
ANION GAP SERPL CALCULATED.3IONS-SCNC: 10 MMOL/L (ref 4–13)
AST SERPL W P-5'-P-CCNC: 19 U/L (ref 13–39)
BASOPHILS # BLD AUTO: 0.05 THOUSANDS/ΜL (ref 0–0.1)
BASOPHILS NFR BLD AUTO: 0 % (ref 0–1)
BILIRUB SERPL-MCNC: 0.4 MG/DL (ref 0.2–1)
BUN SERPL-MCNC: 10 MG/DL (ref 5–25)
CALCIUM SERPL-MCNC: 9.2 MG/DL (ref 8.4–10.2)
CHLORIDE SERPL-SCNC: 103 MMOL/L (ref 96–108)
CHOLEST SERPL-MCNC: 200 MG/DL
CO2 SERPL-SCNC: 24 MMOL/L (ref 21–32)
CREAT SERPL-MCNC: 0.65 MG/DL (ref 0.6–1.3)
EOSINOPHIL # BLD AUTO: 0.26 THOUSAND/ΜL (ref 0–0.61)
EOSINOPHIL NFR BLD AUTO: 2 % (ref 0–6)
ERYTHROCYTE [DISTWIDTH] IN BLOOD BY AUTOMATED COUNT: 13.9 % (ref 11.6–15.1)
EST. AVERAGE GLUCOSE BLD GHB EST-MCNC: 117 MG/DL
GFR SERPL CREATININE-BSD FRML MDRD: 120 ML/MIN/1.73SQ M
GLUCOSE P FAST SERPL-MCNC: 72 MG/DL (ref 65–99)
HBA1C MFR BLD: 5.7 %
HCT VFR BLD AUTO: 41.3 % (ref 34.8–46.1)
HDLC SERPL-MCNC: 29 MG/DL
HGB BLD-MCNC: 12.9 G/DL (ref 11.5–15.4)
IMM GRANULOCYTES # BLD AUTO: 0.06 THOUSAND/UL (ref 0–0.2)
IMM GRANULOCYTES NFR BLD AUTO: 1 % (ref 0–2)
LYMPHOCYTES # BLD AUTO: 3.55 THOUSANDS/ΜL (ref 0.6–4.47)
LYMPHOCYTES NFR BLD AUTO: 28 % (ref 14–44)
MCH RBC QN AUTO: 27.1 PG (ref 26.8–34.3)
MCHC RBC AUTO-ENTMCNC: 31.2 G/DL (ref 31.4–37.4)
MCV RBC AUTO: 87 FL (ref 82–98)
MONOCYTES # BLD AUTO: 0.65 THOUSAND/ΜL (ref 0.17–1.22)
MONOCYTES NFR BLD AUTO: 5 % (ref 4–12)
NEUTROPHILS # BLD AUTO: 8.36 THOUSANDS/ΜL (ref 1.85–7.62)
NEUTS SEG NFR BLD AUTO: 64 % (ref 43–75)
NONHDLC SERPL-MCNC: 171 MG/DL
NRBC BLD AUTO-RTO: 0 /100 WBCS
PLATELET # BLD AUTO: 496 THOUSANDS/UL (ref 149–390)
PMV BLD AUTO: 9.7 FL (ref 8.9–12.7)
POTASSIUM SERPL-SCNC: 4.3 MMOL/L (ref 3.5–5.3)
PROT SERPL-MCNC: 7.2 G/DL (ref 6.4–8.4)
RBC # BLD AUTO: 4.76 MILLION/UL (ref 3.81–5.12)
SODIUM SERPL-SCNC: 137 MMOL/L (ref 135–147)
TRIGL SERPL-MCNC: 429 MG/DL
TSH SERPL DL<=0.05 MIU/L-ACNC: 3.72 UIU/ML (ref 0.45–4.5)
WBC # BLD AUTO: 12.93 THOUSAND/UL (ref 4.31–10.16)

## 2024-10-29 PROCEDURE — 36415 COLL VENOUS BLD VENIPUNCTURE: CPT

## 2024-10-29 PROCEDURE — 85025 COMPLETE CBC W/AUTO DIFF WBC: CPT

## 2024-10-29 PROCEDURE — 83036 HEMOGLOBIN GLYCOSYLATED A1C: CPT

## 2024-10-29 PROCEDURE — 80061 LIPID PANEL: CPT

## 2024-10-29 PROCEDURE — 80053 COMPREHEN METABOLIC PANEL: CPT

## 2024-10-29 PROCEDURE — 84443 ASSAY THYROID STIM HORMONE: CPT

## 2024-10-30 ENCOUNTER — TELEPHONE (OUTPATIENT)
Age: 29
End: 2024-10-30

## 2024-10-30 NOTE — TELEPHONE ENCOUNTER
Patient in stating that she's calling for blood work results, pt was notified of Beth DELACRUZ's recommendations, Pt verbalized understanding, pt stating that she will be calling PCP. Pt asked about pelvic US results, pt was notified of recommendations, Pt verbalized understanding, no further questions at this time

## 2024-11-07 ENCOUNTER — CLINICAL SUPPORT (OUTPATIENT)
Dept: BARIATRICS | Facility: CLINIC | Age: 29
End: 2024-11-07

## 2024-11-07 VITALS — BODY MASS INDEX: 51.48 KG/M2 | WEIGHT: 290.6 LBS

## 2024-11-07 DIAGNOSIS — E66.01 MORBID (SEVERE) OBESITY DUE TO EXCESS CALORIES (HCC): Primary | ICD-10-CM

## 2024-11-07 PROCEDURE — RECHECK

## 2024-11-07 NOTE — PROGRESS NOTES
"Bariatric Nutrition Assessment  - PreOp Visit  Insurance: No required monthly weight checks      Type of surgery    Vertical sleeve gastrectomy  Surgery Date: TBD  Surgeon: Consult with Dr. Maninder alberts 5/20/2024      Nutrition Assessment   Domitila Landers  29 y.o.  female     Wt 132 kg (290 lb 9.6 oz)   BMI 51.48 kg/m²   Initial Weight at Surgeon Consult: 304.2#   BMI: 53.9  Height: 5'3\"  Current Weight: 290.6##  Eval Weight: 296.8#   BMI: 52.6  Wt with BMI of 25: 141#  Pre-Op Excess Wt: 163.2#  BMI to Qualify at 35 = 197.5#  , PMH includes:  Obesity, BARRERA, PTSD, HLD, Fatty Liver.SUSANNA, BiPolar    Pt advised not to gain weight during preop process. Pt encouraged to lose weight via healthy eating and exercise. Pt may follow Liver Shrinking diet 2--4  weeks or more  prior to DOS depending on BMI at time. This diet will promote weight loss.    Carter- . Jeor Equation:    GFB=8090  Weight Maintenance 2450  Estimated calories for weight loss 4473-9976  ( 1-2# per wk wt loss - sedentary )  Estimated protein needs 64-96 g(1.0-1.5 gms/kg IBW )   Estimated fluid needs 64-75 oz (30-35 ml/kg IBW )      NAFLD Fibrosis Score: -3.91 at 10/29/2024 10:02 AM  Calculated from:  SGOT/AST: 19 U/L at 10/29/2024 10:02 AM  SGPT/ALT: 25 U/L at 10/29/2024 10:02 AM  Serum Albumin: 3.8 g/dL at 10/29/2024 10:02 AM  Platelets: 496 Thousands/uL at 10/29/2024 10:02 AM  Age: 29 years  BMI: 52.1 at 9/11/2024  9:52 AM  Weight (recorded): 133.36 kg at 9/11/2024  9:52 AM  Height: 160.00 cm at 8/14/2024  8:47 AM  Has Diabetes: No    Weight History Reason for WLS: Dad passed from massive MI - and he was overweight  Onset of Obesity: Childhood  Family history of obesity: Yes  Wt Loss Attempts: Exercise  Self Created Diets (Portion Control, Healthy Food Choices, etc.)  Mediterrain and Vegetarian  Patient has tried the above for 6 months or more with insufficient weight loss or weight regain, which is why patient has requested to be evaluated for weight " loss surgery today  Maximum Wt Lost: 20#       Review of History and Medications   OTC: None  Possible IBS - diaarhea   Past Medical History:   Diagnosis Date    Anxiety 2016    Bipolar disorder (HCC)     Chronic kidney disease     Depression     Fatty liver     Hyperlipidemia 01/13/2020    Kidney stone 2014    Leukocytosis 05/01/2020    Obesity     Self-injurious behavior     Suicide attempt (HCC)     2016     Past Surgical History:   Procedure Laterality Date    NO PAST SURGERIES       Social History     Socioeconomic History    Marital status: /Civil Union     Spouse name: Not on file    Number of children: Not on file    Years of education: Not on file    Highest education level: Not on file   Occupational History    Occupation: Unemployed   Tobacco Use    Smoking status: Never    Smokeless tobacco: Current     Types: Snuff, Chew   Vaping Use    Vaping status: Never Used   Substance and Sexual Activity    Alcohol use: Yes     Comment: 1-2 mixed drinks once monthly or less    Drug use: No    Sexual activity: Yes     Partners: Male     Birth control/protection: Abstinence, Condom Male   Other Topics Concern    Not on file   Social History Narrative    Not on file     Social Determinants of Health     Financial Resource Strain: Not on file   Food Insecurity: Not on file   Transportation Needs: Not on file   Physical Activity: Not on file   Stress: Not on file   Social Connections: Not on file   Intimate Partner Violence: Not on file   Housing Stability: Not on file       Current Outpatient Medications:     albuterol (ProAir HFA) 90 mcg/act inhaler, Inhale 2 puffs every 4 (four) hours as needed for wheezing or shortness of breath, Disp: 8.5 g, Rfl: 0    benztropine (COGENTIN) 0.5 mg tablet, Take 1 tablet (0.5 mg total) by mouth daily at bedtime as needed for tremors (or jerking), Disp: 30 tablet, Rfl: 2    clonazePAM (KlonoPIN) 0.5 mg tablet, Take 0.5 tablets (0.25 mg total) by mouth 2 (two) times a day as  needed for anxiety, Disp: 30 tablet, Rfl: 2    hydrocortisone 2.5 % cream, Apply topically 2 (two) times a day, Disp: 20 g, Rfl: 1    ketoconazole (NIZORAL) 2 % shampoo, APPLY 1 APPLICATION TOPICALLY 2 (TWO) TIMES A WEEK, Disp: 120 mL, Rfl: 1    lamoTRIgine (LaMICtal) 100 mg tablet, TAKE ONE TABLET BY MOUTH EVERY 24 HRS, Disp: , Rfl:     omega-3-acid ethyl esters (LOVAZA) 1 g capsule, Take 2 capsules (2 g total) by mouth 2 (two) times a day, Disp: 360 capsule, Rfl: 3    PARoxetine (PAXIL) 40 MG tablet, Take 1 tablet (40 mg total) by mouth daily, Disp: 90 tablet, Rfl: 1    QUEtiapine (SEROquel) 100 mg tablet, Take 1/2 tab po qAM and 1 and 1/2 tabs po qhs, Disp: 75 tablet, Rfl: 2    Semaglutide-Weight Management (WEGOVY) 0.25 MG/0.5ML, Inject 0.5 mL (0.25 mg total) under the skin once a week, Disp: 2 mL, Rfl: 0  Food Intake and Lifestyle Assessment   Food Intake Assessment completed via usual diet recall  Breakfast: Muffin bars - [place]  English Muffin - Jam  Snack: Fruit cup  Lunch: Vegetables -Corn, GB, Carrots - potatoes   Snack: fruit  Dinner: Plant based chicken nuggets or chicken, fish, rice & pasta -   Dessert pears, apples, nectarine  Beverage intake:  48 oz water, regular soda,  green tea - honey and Lactose free milk with cereal   Protein supplement: None  Portions:  5- 6 oz Protein  3/4 c Starch   1-1.5 c Vegetable    Estimated protein intake per day: 50-60 grams  Estimated fluid intake per day:  48-64 oz   Meals eaten away from home: 3-4X month  Typical meal pattern: 3 meals per day and 2-3 snacks per day  Eating Behaviors: Consumption of high calorie/ high fat foods, Consumption of high calorie beverages, Large portion sizes, and Mindless eating  Hx of Eating Disorder - Anorexia - No tx  reports  helped her  Food allergies or intolerances: No Known Allergies or intolerances: Lactose, Eggs, Ice cream - excess salads gives her diarrhea  Cultural or Rastafari considerations: None    Physical  "Assessment  Physical Activity Not able to keep up   Takes care of 2 kittens - Has 8 yo pit mix  Types of exercise: Walking  Current physical limitations: \"mental illness\" doesn't allow her to keep up     Psychosocial Assessment   Support systems: spouse   (mom and brother not supportive)  Socioeconomic factors:   Not currently employed. She states she gained weight with medication for treating bipolar     Nutrition Diagnosis  Diagnosis: Overweight / Obesity (NC-3.3)  Related to: Physical inactivity and Excessive energy intake  As Evidenced by: BMI >25     Nutrition Prescription: Recommend the following diet  Low fat, Low sugar, High protein, and Regular    Interventions and Teaching   Discussed pre-op and post-op nutrition guidelines.       Patient educated and handouts provided.  Surgical changes to stomach / GI  Capacity of post-surgery stomach  Diet progression  Adequate hydration  Sugar and fat restriction to decrease \"dumping syndrome\"  Fat restriction to decrease steatorrhea  Expected weight loss  Weight loss plateaus/ possibility of weight regain  Exercise  Suggestions for pre-op diet  Nutrition considerations after surgery  Protein supplements  Meal planning and preparation  Appropriate carbohydrate, protein, and fat intake, and food/fluid choices to maximize safe weight loss, nutrient intake, and tolerance   Dietary and lifestyle changes  Possible problems with poor eating habits  Intuitive eating  Techniques for self monitoring and keeping daily food journal  Potential for food intolerance after surgery, and ways to deal with them including: lactose intolerance, nausea, reflux, vomiting, diarrhea, food intolerance, appetite changes, gas  Vitamin / Mineral supplementation of Multivitamin with minerals, Calcium, Vitamin B12, Iron, Fat Soluble vitamins, and Vitamin D    Patient is not currently pregnant and doesn't desire to become pregnant a minimum of one year post-op    Education provided to: " patient    Barriers to learning: No barriers identified    Readiness to change: preparation    Prior research on procedure: internet and discussed with provider  (ELVIRA has WLS in the 1990s)    Comprehension: verbalizes understanding     Expected Compliance: Fair to good  Recommendations  Pt is an appropriate candidate for surgery. N/A - Further assess - hx of eating disorders - not treated   Evaluation / Monitoring  Dietitian to Monitor: Eating pattern as discussed Tolerance of nutrition prescription Body weight Lab values Physical activity Bowel pattern    Monthly PreOp Visit Summary 9/11/2024  Started process and has lost 10#. Reports to have stopped drinking  soda - increasing water (up to 3 bottles) with flavoring Decreasing coffee - 1 cup few days per week not daily. Enjoying new foods - claudia new types of fruit.  Recall below. Advised to include protein at meals and keep to 1 SW vs 2.   Recall  B-  Tomato sandwiches - 2 (white bread, wodo, salt and tomato  L- new fruit, salmon with rice  or chicken nuggets (tofu mushroom)  D [place] , rice green beans - or another tomato sandwich  HS:  white nectarine  Started introducing 30/60 - finds challenging.  Eating slower - Notices now that drinking with straw making her gasier. Questions answered during discussion. Workflow updated    Monthly PreOp Visit Summary 11/7/2024  Continues to prepare for WLS. Gradually losing weight each month. Notes having problem with menstrual cycle since IUD removed. Advised to discuss with her GYN. Also had 3 teeth extracted. Continue to make better choices and cooking most meals. Recall below.  Recall  B- Eggs or egg sandwich or tofu nuggets  L- Soups or same as breakfast  D- Soups or fish with vegetables - some rice  HS: Fruit cup  Activity: walks but has decreased d/t bears in neighborhood.  Water intake improved - at least 4 bottle. - Coffee or tea - but sparingly. Interested in more natural nutrition - (fermenting honey and garlic)   Reviewing manual and working on guidelines. Now likes eating slow. Started 30/60 and finds it ok to do.  Reviewed BW - pt notes increased WBC d/t her psoriasis Also noted elevated platelets and was advised to see PCP - Pt had seen oncologist in past re: elevated WBCs. Questions/concerns addressed during visit. Pt verbalozes that she is having second thoughts about the surgery but at this point still wants to continue with process.   Workflow reviewed:   Psych and/or D+A Clearance: - 9/4  Ruben EDOUARD - [facility] - Needs psych and therapist- LCSW faxed paperwork to psychiatrist NP, awaiting response, plans to discuss scheduling with a therapist with her psychiatrist NP, also provided resource list again - Pt needs to call   PCP Letter: Referral in her chart  Surgeon Appt: 5/20/24  EGD: Needs to schedule once psych received - will schedule at next visit  Cardiac Risk Assessment: completed 7/23  Sleep Studies: BARRERA+- has appointment 7/17- home study completed 8/7-mild BARRERA, CPAP ordered 8/12 - -picks up CPAP on 9/13/2024  Blood work: Needs to complete - ordered 9/11/24and completed 10/29/2024 - reviewdd  Nicotine test: N/A  Weight loss meds: N/A  Required weight checks: No weight checks required by insurance, our program requires monthly follow u    Goals  Eliminate sugar sweetened beverages, Food journal, Exercise 30 minutes 5 times per week, Complete lession plans 1-6, Eat 3 meals per day, and Eliminate mindless snacking  Follow Pre-Surgery guidelines  > Trial Baritastic for food logging  > Maintain regular meal pattern - include fruits, vegetables and whole grains  > Avoid skipping meals- Can use protein drink as meal replacement  > Decrease portions  > Focus on protein - include lean protein at each meal and snack - Learn to eat protein first  > Continue to limit processed foods, fast foods and dining away from home  > Include meal prepping  > Limit snacks - healthier choices and portion; avoid grazing  > Slow pace of  eating and sip fluids  - practice 30/60 minute rule  > Eliminate caffeine by day of surgery  > Reduce/eliminate carbonation by pre-op diet  > Increase water intake - 64 oz  > Increase physical activity/establish exercise regimen as able  > Start multi vitamin and additional Vitamin D 2000IU  Pre-op weight loss not required but advised not to gain weight  Glycemic Control: HgA1c 5.7  on  10/29/2024  F/U next month with bariatric provider        Time Spent:   30 minutes

## 2024-11-14 ENCOUNTER — RA CDI HCC (OUTPATIENT)
Dept: OTHER | Facility: HOSPITAL | Age: 29
End: 2024-11-14

## 2024-11-21 ENCOUNTER — TELEMEDICINE (OUTPATIENT)
Dept: FAMILY MEDICINE CLINIC | Facility: CLINIC | Age: 29
End: 2024-11-21
Payer: COMMERCIAL

## 2024-11-21 DIAGNOSIS — E78.1 HYPERTRIGLYCERIDEMIA: Primary | ICD-10-CM

## 2024-11-21 DIAGNOSIS — L30.9 DERMATITIS: ICD-10-CM

## 2024-11-21 DIAGNOSIS — D75.839 THROMBOCYTHEMIA: ICD-10-CM

## 2024-11-21 PROCEDURE — 99214 OFFICE O/P EST MOD 30 MIN: CPT | Performed by: FAMILY MEDICINE

## 2024-11-21 RX ORDER — ESCITALOPRAM OXALATE 10 MG/1
10 TABLET ORAL DAILY
COMMUNITY

## 2024-11-21 RX ORDER — KETOCONAZOLE 20 MG/ML
1 SHAMPOO, SUSPENSION TOPICAL 2 TIMES WEEKLY
Qty: 120 ML | Refills: 1 | Status: SHIPPED | OUTPATIENT
Start: 2024-11-21

## 2024-11-21 RX ORDER — TOPIRAMATE 50 MG/1
50 TABLET, FILM COATED ORAL DAILY
COMMUNITY

## 2024-11-21 NOTE — PROGRESS NOTES
Virtual Regular Visit  Name: Domitila Landers      : 1995      MRN: [id number]  Encounter Provider: Jose Wallace MD  Encounter Date: 2024   Encounter department: [facility]      Verification of patient location:  Patient is located at Home in the following state in which I hold an active license [place] :  Assessment & Plan  Hypertriglyceridemia    Orders:    Lipid Panel with Direct LDL reflex; Future    Dermatitis    Orders:    ketoconazole (NIZORAL) 2 % shampoo; Apply 1 Application topically 2 (two) times a week    Thrombocythemia    Orders:    Thrombosis Panel; Future    TSH + Free T4; Future    SHWETHA Screen w/ Reflex to Titer/Pattern; Future    Iron Panel (Includes Ferritin, Iron Sat%, Iron, and TIBC); Future    CBC and differential; Future      Patient presents today for follow-up lab work which was ordered by bariatric surgery.  Continues to have elevated WBC and platelet count.  Previously saw hematology and workup was unremarkable at that time.  She does have psoriasis which could be contributing to elevated levels.  Plan to repeat lab work listed above.  Hypertriglyceridemia  Prescribed Lovaza but has not been taking it recently.    Encounter provider Jose Wallace MD    The patient was identified by name and date of birth. Domitila Landers was informed that this is a telemedicine visit and that the visit is being conducted through the Epic Embedded platform. She agrees to proceed..  My office door was closed. No one else was in the room.  She acknowledged consent and understanding of privacy and security of the video platform. The patient has agreed to participate and understands they can discontinue the visit at any time.    Patient is aware this is a billable service.     History of Present Illness     Patient presents today to review lab work.  Lab work was ordered by bariatric surgeon.  Noted for elevated platelet count and WBC.  This been an ongoing issue.   She was seen by hematology in 2020 and workup at that time was unremarkable.  Thought to be reactive thrombocytosis.  She does have underlying psoriasis which could be causing this elevation.  Will repeat lab work.  She also has noted hypertriglyceridemia.  Previously seen by cardiology.  We started her on Lovaza over the summer.  She has not taken it in a month.  This will likely improve with weight loss.      Review of Systems   Constitutional:  Negative for activity change, fatigue and fever.   Eyes:  Negative for visual disturbance.   Respiratory:  Negative for shortness of breath.    Cardiovascular:  Negative for chest pain.   Gastrointestinal:  Negative for abdominal pain, constipation, diarrhea and nausea.   Endocrine: Negative for cold intolerance and heat intolerance.   Musculoskeletal:  Negative for back pain.   Skin:  Negative for rash.   Neurological:  Negative for headaches.   Psychiatric/Behavioral:  Negative for confusion.        Objective   There were no vitals taken for this visit.    Physical Exam    Visit Time  Total Visit Duration: 30

## 2024-12-05 ENCOUNTER — TELEPHONE (OUTPATIENT)
Dept: BARIATRICS | Facility: CLINIC | Age: 29
End: 2024-12-05

## 2024-12-13 ENCOUNTER — PREP FOR PROCEDURE (OUTPATIENT)
Dept: BARIATRICS | Facility: CLINIC | Age: 29
End: 2024-12-13

## 2024-12-13 ENCOUNTER — CLINICAL SUPPORT (OUTPATIENT)
Dept: BARIATRICS | Facility: CLINIC | Age: 29
End: 2024-12-13

## 2024-12-13 VITALS — WEIGHT: 289.4 LBS | BODY MASS INDEX: 51.26 KG/M2

## 2024-12-13 DIAGNOSIS — Z71.89 ENCOUNTER FOR PRE-BARIATRIC SURGERY COUNSELING AND EDUCATION: Primary | ICD-10-CM

## 2024-12-13 DIAGNOSIS — E66.01 MORBID OBESITY (HCC): Primary | ICD-10-CM

## 2024-12-13 PROCEDURE — RECHECK

## 2024-12-13 NOTE — PROGRESS NOTES
"Pre op. Had to have another tooth removed earlier this week. Still on soft diet due to this. Planning to follow up about getting a therapist. Have not received paperwork from psychiatric NP- has a follow up with him next week and will follow up. Patient had an appointment with her PCP to discuss blood work- he ordered follow up blood work- has psoriasis and testing an SHWETHA. Also ordered repeat TSH, iron, lipids, and thrombosis panel- to complete. Has been having 3 meals per day. Drinking 4 bottles of water with sugar free add ins daily.  is hesitant about her going through with the surgery- she may call about payment plans and  will need to call his insurance company to see what the out of pocket will be.  Workflow reviewed:   Psych and/or D+A Clearance: Needs psych and therapist- LCSW faxed paperwork to psychiatrist NP, awaiting response, still looking for a therapist, which got put on hold due to getting her teeth pulled.  Support Group: No longer required, strongly encouraged  Surgeon Appt: 5/20/24  EGD: Needs to schedule  Cardiac Risk Assessment: completed 7/23  Sleep Studies: BARRERA+- home study completed 8/7-mild BARRERA  Blood work: Needs to complete repeat orders ordered by PCP.   Nicotine test: N/A  Weight loss meds: N/A  Required weight checks: No weight checks required by insurance, our program requires monthly follow up  Weight Loss: Not required, encouraged positive lifestyle changes.  Adry Bernard LCSW          Saw the Psychiatric NP on 9/4- switched all her psychiatric medications was on Paxil for 10 years- prescribed Lexapro and Topamax for sleep at night. Feels that she can feel her emotions now. More engaged rather than feeling like a \"zombie\". Has to call him back as he called her yesterday to discuss the paperwork that I had sent. Has been on soft food for about a week since having 3 teeth pulled last week. Has been having 3 meals per day. Trying new foods. Fitting into new clothes. " Drinking 3 bottles of water with sugar free add ins, 1-2 bottles of green tea per week, 10 oz hot tea 1x per week. Getting about 2k steps per day, plans on using her treadmill. Stomach getting upset before bed- patient to see if it is something she is eating for dinner that is different.

## 2025-01-06 DIAGNOSIS — L30.9 DERMATITIS: ICD-10-CM

## 2025-01-08 RX ORDER — KETOCONAZOLE 20 MG/ML
1 SHAMPOO, SUSPENSION TOPICAL 2 TIMES WEEKLY
Qty: 120 ML | Refills: 1 | Status: SHIPPED | OUTPATIENT
Start: 2025-01-09

## 2025-01-14 NOTE — PROGRESS NOTES
"Bariatric Nutrition Assessment  - PreOp Visit  Insurance: No required monthly weight checks      Type of surgery    Vertical sleeve gastrectomy  Surgery Date: TBD  Surgeon: Consult with Dr. Maninder alberts 5/20/2024      Nutrition Assessment   Domitila Landers  29 y.o.  female     Wt 131 kg (289 lb)   BMI 51.19 kg/m²   Initial Weight at Surgeon Consult: 304.2#   BMI: 53.9  Height: 5'3\"  Current Weight: 289##  Eval Weight: 296.8#   BMI: 52.6  Wt with BMI of 25: 141#  Pre-Op Excess Wt: 163.2#  BMI to Qualify at 35 = 197.5#  , PMH includes:  Obesity, BARRERA, PTSD, HLD, Fatty Liver.SUSANNA, BiPolar    Pt advised not to gain weight during preop process. Pt encouraged to lose weight via healthy eating and exercise. Pt may follow Liver Shrinking diet 2--4  weeks or more  prior to DOS depending on BMI at time. This diet will promote weight loss.    Carter- St. Jeor Equation:    PLY=2259  Weight Maintenance 2450  Estimated calories for weight loss 6502-9488  ( 1-2# per wk wt loss - sedentary )  Estimated protein needs 64-96 g(1.0-1.5 gms/kg IBW )   Estimated fluid needs 64-75 oz (30-35 ml/kg IBW )      NAFLD Fibrosis Score: -3.91 at 10/29/2024 10:02 AM  Calculated from:  SGOT/AST: 19 U/L at 10/29/2024 10:02 AM  SGPT/ALT: 25 U/L at 10/29/2024 10:02 AM  Serum Albumin: 3.8 g/dL at 10/29/2024 10:02 AM  Platelets: 496 Thousands/uL at 10/29/2024 10:02 AM  Age: 29 years  BMI: 52.1 at 9/11/2024  9:52 AM  Weight (recorded): 133.36 kg at 9/11/2024  9:52 AM  Height: 160.00 cm at 8/14/2024  8:47 AM  Has Diabetes: No    Weight History Reason for WLS: Dad passed from massive MI - and he was overweight  Onset of Obesity: Childhood  Family history of obesity: Yes  Wt Loss Attempts: Exercise  Self Created Diets (Portion Control, Healthy Food Choices, etc.)  Mediterrain and Vegetarian  Patient has tried the above for 6 months or more with insufficient weight loss or weight regain, which is why patient has requested to be evaluated for weight loss " surgery today  Maximum Wt Lost: 20#       Review of History and Medications   OTC: None  Possible IBS - diaarhea   Past Medical History:   Diagnosis Date    Anxiety 2016    Bipolar disorder (HCC)     Chronic kidney disease     Depression     Fatty liver     Hyperlipidemia 01/13/2020    Kidney stone 2014    Leukocytosis 05/01/2020    Obesity     Self-injurious behavior     Sleep apnea     Suicide attempt (HCC)     2016     Past Surgical History:   Procedure Laterality Date    NO PAST SURGERIES       Social History     Socioeconomic History    Marital status: /Civil Union     Spouse name: Not on file    Number of children: Not on file    Years of education: Not on file    Highest education level: Not on file   Occupational History    Occupation: Unemployed   Tobacco Use    Smoking status: Never    Smokeless tobacco: Current     Types: Snuff, Chew   Vaping Use    Vaping status: Never Used   Substance and Sexual Activity    Alcohol use: Yes     Comment: 1-2 mixed drinks once monthly or less    Drug use: No    Sexual activity: Yes     Partners: Male     Birth control/protection: Abstinence, Condom Male   Other Topics Concern    Not on file   Social History Narrative    Not on file     Social Drivers of Health     Financial Resource Strain: Not on file   Food Insecurity: Not on file   Transportation Needs: Not on file   Physical Activity: Not on file   Stress: Not on file   Social Connections: Not on file   Intimate Partner Violence: Not on file   Housing Stability: Not on file       Current Outpatient Medications:     albuterol (ProAir HFA) 90 mcg/act inhaler, Inhale 2 puffs every 4 (four) hours as needed for wheezing or shortness of breath, Disp: 8.5 g, Rfl: 0    clonazePAM (KlonoPIN) 0.5 mg tablet, Take 0.5 tablets (0.25 mg total) by mouth 2 (two) times a day as needed for anxiety, Disp: 30 tablet, Rfl: 2    escitalopram (LEXAPRO) 10 mg tablet, Take 10 mg by mouth daily, Disp: , Rfl:     hydrocortisone 2.5 %  "cream, Apply topically 2 (two) times a day, Disp: 20 g, Rfl: 1    ketoconazole (NIZORAL) 2 % shampoo, APPLY 1 APPLICATION TOPICALLY 2 (TWO) TIMES A WEEK, Disp: 120 mL, Rfl: 1    omega-3-acid ethyl esters (LOVAZA) 1 g capsule, Take 2 capsules (2 g total) by mouth 2 (two) times a day, Disp: 360 capsule, Rfl: 3    topiramate (TOPAMAX) 50 MG tablet, Take 50 mg by mouth daily, Disp: , Rfl:   Food Intake and Lifestyle Assessment   Food Intake Assessment completed via usual diet recall  Breakfast: Muffin bars - [place]  English Muffin - Jam  Snack: Fruit cup  Lunch: Vegetables -Corn, GB, Carrots - potatoes   Snack: fruit  Dinner: Plant based chicken nuggets or chicken, fish, rice & pasta -   Dessert pears, apples, nectarine  Beverage intake:  48 oz water, regular soda,  green tea - honey and Lactose free milk with cereal   Protein supplement: None  Portions:  5- 6 oz Protein  3/4 c Starch   1-1.5 c Vegetable    Estimated protein intake per day: 50-60 grams  Estimated fluid intake per day:  48-64 oz   Meals eaten away from home: 3-4X month  Typical meal pattern: 3 meals per day and 2-3 snacks per day  Eating Behaviors: Consumption of high calorie/ high fat foods, Consumption of high calorie beverages, Large portion sizes, and Mindless eating  Hx of Eating Disorder - Anorexia - No tx  reports  helped her  Food allergies or intolerances: No Known Allergies or intolerances: Lactose, Eggs, Ice cream - excess salads gives her diarrhea  Cultural or Samaritan considerations: None    Physical Assessment  Physical Activity Not able to keep up   Takes care of 2 kittens - Has 8 yo pit mix  Types of exercise: Walking  Current physical limitations: \"mental illness\" doesn't allow her to keep up     Psychosocial Assessment   Support systems: spouse   (mom and brother not supportive)  Socioeconomic factors:   Not currently employed. She states she gained weight with medication for treating bipolar     Nutrition " "Diagnosis  Diagnosis: Overweight / Obesity (NC-3.3)  Related to: Physical inactivity and Excessive energy intake  As Evidenced by: BMI >25     Nutrition Prescription: Recommend the following diet  Low fat, Low sugar, High protein, and Regular    Interventions and Teaching   Discussed pre-op and post-op nutrition guidelines.       Patient educated and handouts provided.  Surgical changes to stomach / GI  Capacity of post-surgery stomach  Diet progression  Adequate hydration  Sugar and fat restriction to decrease \"dumping syndrome\"  Fat restriction to decrease steatorrhea  Expected weight loss  Weight loss plateaus/ possibility of weight regain  Exercise  Suggestions for pre-op diet  Nutrition considerations after surgery  Protein supplements  Meal planning and preparation  Appropriate carbohydrate, protein, and fat intake, and food/fluid choices to maximize safe weight loss, nutrient intake, and tolerance   Dietary and lifestyle changes  Possible problems with poor eating habits  Intuitive eating  Techniques for self monitoring and keeping daily food journal  Potential for food intolerance after surgery, and ways to deal with them including: lactose intolerance, nausea, reflux, vomiting, diarrhea, food intolerance, appetite changes, gas  Vitamin / Mineral supplementation of Multivitamin with minerals, Calcium, Vitamin B12, Iron, Fat Soluble vitamins, and Vitamin D    Patient is not currently pregnant and doesn't desire to become pregnant a minimum of one year post-op    Education provided to: patient    Barriers to learning: No barriers identified    Readiness to change: preparation    Prior research on procedure: internet and discussed with provider  (ELVIRA has WLS in the 1990s)    Comprehension: verbalizes understanding     Expected Compliance: Fair to good  Recommendations  Pt is an appropriate candidate for surgery. N/A - Further assess - hx of eating disorders - not treated   Evaluation / Monitoring  Dietitian to " Monitor: Eating pattern as discussed Tolerance of nutrition prescription Body weight Lab values Physical activity Bowel pattern    Monthly PreOp Visit Summary 9/11/2024  Started process and has lost 10#. Reports to have stopped drinking  soda - increasing water (up to 3 bottles) with flavoring Decreasing coffee - 1 cup few days per week not daily. Enjoying new foods - claudia new types of fruit.  Recall below. Advised to include protein at meals and keep to 1 SW vs 2.   Recall  B-  Tomato sandwiches - 2 (white bread, wood, salt and tomato  L- new fruit, salmon with rice  or chicken nuggets (tofu mushroom)  D Cameron , rice green beans - or another tomato sandwich  HS:  white nectarine  Started introducing 30/60 - finds challenging.  Eating slower - Notices now that drinking with straw making her gasier. Questions answered during discussion. Workflow updated    Monthly PreOp Visit Summary 11/7/2024  Continues to prepare for WLS. Gradually losing weight each month. Notes having problem with menstrual cycle since IUD removed. Advised to discuss with her GYN. Also had 3 teeth extracted. Continue to make better choices and cooking most meals. Recall below.  Recall  B- Eggs or egg sandwich or tofu nuggets  L- Soups or same as breakfast  D- Soups or fish with vegetables - some rice  HS: Fruit cup  Activity: walks but has decreased d/t bears in neighborhood.  Water intake improved - at least 4 bottle. - Coffee or tea - but sparingly. Interested in more natural nutrition - (fermenting honey and garlic)  Reviewing manual and working on guidelines. Now likes eating slow. Started 30/60 and finds it ok to do.  Reviewed BW - pt notes increased WBC d/t her psoriasis Also noted elevated platelets and was advised to see PCP - Pt had seen oncologist in past re: elevated WBCs. Questions/concerns addressed during visit. Pt verbalozes that she is having second thoughts about the surgery but at this point still wants to continue with process.      Monthly PreOp Visit Summary 1/15/2025  Has therapist - sees weekly feels she is helping. Ready to move forward with surgery. Making more foods at home - including her own butter. Incorporating 30/69 - going better. Stoppedher coffee, tea as having eye twitches. Increased water to 5 bottles. Also using [place] milk - unsw with PB powder. Also tried Ensure and Premier - to try Fairlife and Whey Protein Powders.  Lost 15# to date with making changes  Activity slowed with cold  Addressed platelets with PCP and ordered more blood work and also for RA- pt to complete this week   Discussed next stepsof process as pt nears completion of her workflow    Workflow reviewed:   Psych and/or D+A Clearance: - 9/4  Ruben EDOUARD - I See therapist weekly  Surgeon Appt: 5/20/24  EGD: Scheduled for 1/24/2025  Cardiac Risk Assessment: completed 7/23 - may need update  Sleep Studies: BARRERA+- has appointment 7/17- home study completed 8/7-mild BARRERA, CPAP ordered 8/12 - -p Pt decided not to get CPAP- since wt loss - sleeping better   Blood work: Needs to complete - ordered 9/11/24and completed 10/29/2024 - reviewd  Nicotine test: N/A  Weight loss meds: N/A  Required weight checks: No weight checks required by insurance, our program requires monthly follow u    Goals  Eliminate sugar sweetened beverages, Food journal, Exercise 30 minutes 5 times per week, Complete lession plans 1-6, Eat 3 meals per day, and Eliminate mindless snacking  Follow Pre-Surgery guidelines  > Trial Baritastic for food logging  > Maintain regular meal pattern - include fruits, vegetables and whole grains  > Avoid skipping meals- Can use protein drink as meal replacement  > Decrease portions  > Focus on protein - include lean protein at each meal and snack - Learn to eat protein first  > Continue to limit processed foods, fast foods and dining away from home  > Include meal prepping  > Limit snacks - healthier choices and portion; avoid grazing  > Slow pace of  eating and sip fluids  - practice 30/60 minute rule  > Eliminate caffeine by day of surgery  > Reduce/eliminate carbonation by pre-op diet  > Increase water intake - 64 oz  > Increase physical activity/establish exercise regimen as able  > Continue multi vitamin  and additional Vitamin D 2000IU  Pre-op weight loss not required but advised not to gain weight  Glycemic Control: HgA1c 5.7  on  10/29/2024  F/U next month with bariatric provider        Time Spent:   30 minutes

## 2025-01-15 ENCOUNTER — CLINICAL SUPPORT (OUTPATIENT)
Dept: BARIATRICS | Facility: CLINIC | Age: 30
End: 2025-01-15

## 2025-01-15 VITALS — WEIGHT: 289 LBS | BODY MASS INDEX: 51.19 KG/M2

## 2025-01-15 DIAGNOSIS — E66.01 MORBID (SEVERE) OBESITY DUE TO EXCESS CALORIES (HCC): Primary | ICD-10-CM

## 2025-01-15 PROCEDURE — RECHECK

## 2025-01-24 ENCOUNTER — ANESTHESIA (OUTPATIENT)
Dept: GASTROENTEROLOGY | Facility: HOSPITAL | Age: 30
End: 2025-01-24
Payer: COMMERCIAL

## 2025-01-24 ENCOUNTER — ANESTHESIA EVENT (OUTPATIENT)
Dept: GASTROENTEROLOGY | Facility: HOSPITAL | Age: 30
End: 2025-01-24
Payer: COMMERCIAL

## 2025-01-24 ENCOUNTER — HOSPITAL ENCOUNTER (OUTPATIENT)
Dept: GASTROENTEROLOGY | Facility: HOSPITAL | Age: 30
Setting detail: OUTPATIENT SURGERY
End: 2025-01-24
Attending: SURGERY
Payer: COMMERCIAL

## 2025-01-24 VITALS
TEMPERATURE: 98.1 F | HEART RATE: 84 BPM | WEIGHT: 286.6 LBS | RESPIRATION RATE: 16 BRPM | HEIGHT: 63 IN | BODY MASS INDEX: 50.78 KG/M2 | DIASTOLIC BLOOD PRESSURE: 80 MMHG | SYSTOLIC BLOOD PRESSURE: 113 MMHG | OXYGEN SATURATION: 98 %

## 2025-01-24 DIAGNOSIS — E66.01 MORBID OBESITY (HCC): ICD-10-CM

## 2025-01-24 LAB
EXT PREGNANCY TEST URINE: NEGATIVE
EXT. CONTROL: NORMAL

## 2025-01-24 PROCEDURE — 43239 EGD BIOPSY SINGLE/MULTIPLE: CPT | Performed by: SURGERY

## 2025-01-24 PROCEDURE — 81025 URINE PREGNANCY TEST: CPT | Performed by: SURGERY

## 2025-01-24 PROCEDURE — 88305 TISSUE EXAM BY PATHOLOGIST: CPT | Performed by: STUDENT IN AN ORGANIZED HEALTH CARE EDUCATION/TRAINING PROGRAM

## 2025-01-24 RX ORDER — GLYCOPYRROLATE 0.2 MG/ML
INJECTION INTRAMUSCULAR; INTRAVENOUS AS NEEDED
Status: DISCONTINUED | OUTPATIENT
Start: 2025-01-24 | End: 2025-01-24

## 2025-01-24 RX ORDER — SODIUM CHLORIDE, SODIUM LACTATE, POTASSIUM CHLORIDE, CALCIUM CHLORIDE 600; 310; 30; 20 MG/100ML; MG/100ML; MG/100ML; MG/100ML
INJECTION, SOLUTION INTRAVENOUS CONTINUOUS PRN
Status: DISCONTINUED | OUTPATIENT
Start: 2025-01-24 | End: 2025-01-24

## 2025-01-24 RX ORDER — LIDOCAINE HYDROCHLORIDE 20 MG/ML
INJECTION, SOLUTION EPIDURAL; INFILTRATION; INTRACAUDAL; PERINEURAL AS NEEDED
Status: DISCONTINUED | OUTPATIENT
Start: 2025-01-24 | End: 2025-01-24

## 2025-01-24 RX ORDER — PROPOFOL 10 MG/ML
INJECTION, EMULSION INTRAVENOUS AS NEEDED
Status: DISCONTINUED | OUTPATIENT
Start: 2025-01-24 | End: 2025-01-24

## 2025-01-24 RX ORDER — KETAMINE HYDROCHLORIDE 100 MG/ML
INJECTION, SOLUTION INTRAMUSCULAR; INTRAVENOUS AS NEEDED
Status: DISCONTINUED | OUTPATIENT
Start: 2025-01-24 | End: 2025-01-24

## 2025-01-24 RX ORDER — SODIUM CHLORIDE, SODIUM LACTATE, POTASSIUM CHLORIDE, CALCIUM CHLORIDE 600; 310; 30; 20 MG/100ML; MG/100ML; MG/100ML; MG/100ML
50 INJECTION, SOLUTION INTRAVENOUS CONTINUOUS
Status: DISPENSED | OUTPATIENT
Start: 2025-01-24

## 2025-01-24 RX ADMIN — KETAMINE HYDROCHLORIDE 20 MG: 100 INJECTION, SOLUTION INTRAMUSCULAR; INTRAVENOUS at 07:58

## 2025-01-24 RX ADMIN — SODIUM CHLORIDE, SODIUM LACTATE, POTASSIUM CHLORIDE, AND CALCIUM CHLORIDE 50 ML/HR: .6; .31; .03; .02 INJECTION, SOLUTION INTRAVENOUS at 07:15

## 2025-01-24 RX ADMIN — GLYCOPYRROLATE 0.2 MCG: 0.2 INJECTION INTRAMUSCULAR; INTRAVENOUS at 07:57

## 2025-01-24 RX ADMIN — PROPOFOL 150 MG: 10 INJECTION, EMULSION INTRAVENOUS at 07:56

## 2025-01-24 RX ADMIN — PROPOFOL 20 MG: 10 INJECTION, EMULSION INTRAVENOUS at 07:58

## 2025-01-24 RX ADMIN — SODIUM CHLORIDE, SODIUM LACTATE, POTASSIUM CHLORIDE, CALCIUM CHLORIDE: 600; 310; 30; 20 INJECTION, SOLUTION INTRAVENOUS at 07:55

## 2025-01-24 RX ADMIN — LIDOCAINE HYDROCHLORIDE 100 MG: 20 INJECTION, SOLUTION EPIDURAL; INFILTRATION; INTRACAUDAL; PERINEURAL at 07:56

## 2025-01-24 NOTE — ANESTHESIA POSTPROCEDURE EVALUATION
Post-Op Assessment Note    CV Status:  Stable  Pain Score: 0    Pain management: adequate       Mental Status:  Sleepy   Hydration Status:  Euvolemic   PONV Controlled:  Controlled   Airway Patency:  Patent and adequate  Two or more mitigation strategies used for obstructive sleep apnea   Post Op Vitals Reviewed: Yes    No anethesia notable event occurred.    Staff: CRNA           Last Filed PACU Vitals:  Vitals Value Taken Time   Temp 98.1 °F (36.7 °C) 01/24/25 0805   Pulse 80 01/24/25 0805   /79 01/24/25 0805   Resp 18 01/24/25 0805   SpO2 99 % 01/24/25 0805       Modified Francois:     Vitals Value Taken Time   Activity 2 01/24/25 0806   Respiration 2 01/24/25 0806   Circulation 2 01/24/25 0806   Consciousness 0 01/24/25 0806   Oxygen Saturation 2 01/24/25 0806     Modified Francois Score: 8

## 2025-01-24 NOTE — H&P
"This is a 29 y.o. female with a history of morbid obesity and Body mass index is 50.77 kg/m².  Here for an EGD to evaluate the anatomy of the GI tract and to rule out the presence of H. pylori.    Physical Exam    /64   Pulse 87   Temp 97.6 °F (36.4 °C) (Temporal)   Resp 16   Ht 5' 3\" (1.6 m)   Wt 130 kg (286 lb 9.6 oz)   SpO2 99%   BMI 50.77 kg/m²    AAOx3  RRR  CTA B  Abdomen obese. Benign.  Extremities warm and dry       A/P:    This is a 29 y.o. female with a history of morbid obesity and Body mass index is 50.77 kg/m²..    Will proceed with the EGD and biopsies.      Angie Dickens MD  1/24/2025  7:41 AM         "

## 2025-01-24 NOTE — ANESTHESIA PREPROCEDURE EVALUATION
Procedure:  EGD    Relevant Problems   CARDIO   (+) Mixed hyperlipidemia      ENDO   (+) Subclinical hypothyroidism      NEURO/PSYCH   (+) Bipolar affective disorder, current episode depressed (HCC)   (+) SUSANNA (generalized anxiety disorder)   (+) PTSD (post-traumatic stress disorder)      PULMONARY   (+) BARRERA (obstructive sleep apnea)      Chronic kidney disease    Depression    Hyperlipidemia    Bipolar disorder (HCC)    Self-injurious behavior    Suicide attempt (HCC) 2016   Fatty liver    Anxiety    Kidney stone    Obesity    Leukocytosis    Sleep apnea    Super morbid obesity       Physical Exam    Airway    Mallampati score: III  TM Distance: >3 FB  Neck ROM: full     Dental       Cardiovascular  Cardiovascular exam normal    Pulmonary  Pulmonary exam normal     Other Findings  post-pubertal.      Anesthesia Plan  ASA Score- 3     Anesthesia Type- IV sedation with anesthesia with ASA Monitors.         Additional Monitors:     Airway Plan:            Plan Factors-Exercise tolerance (METS): >4 METS.    Chart reviewed. EKG reviewed. Imaging results reviewed. Existing labs reviewed. Patient summary reviewed.                  Induction- intravenous.    Postoperative Plan-         Informed Consent- Anesthetic plan and risks discussed with patient.  I personally reviewed this patient with the CRNA. Discussed and agreed on the Anesthesia Plan with the CRNA..      NPO Status:  Vitals Value Taken Time   Date of last liquid 01/23/25 01/24/25 0707   Time of last liquid 2200 01/24/25 0707   Date of last solid 01/23/25 01/24/25 0707   Time of last solid 1700 01/24/25 0707

## 2025-01-27 ENCOUNTER — APPOINTMENT (OUTPATIENT)
Dept: LAB | Facility: MEDICAL CENTER | Age: 30
End: 2025-01-27
Payer: COMMERCIAL

## 2025-01-27 DIAGNOSIS — D75.839 THROMBOCYTHEMIA: ICD-10-CM

## 2025-01-27 DIAGNOSIS — E78.1 HYPERTRIGLYCERIDEMIA: ICD-10-CM

## 2025-01-27 LAB
BASOPHILS # BLD AUTO: 0.05 THOUSANDS/ΜL (ref 0–0.1)
BASOPHILS NFR BLD AUTO: 0 % (ref 0–1)
CHOLEST SERPL-MCNC: 153 MG/DL (ref ?–200)
DEPRECATED AT III PPP: 108 % OF NORMAL (ref 92–136)
EOSINOPHIL # BLD AUTO: 0.41 THOUSAND/ΜL (ref 0–0.61)
EOSINOPHIL NFR BLD AUTO: 4 % (ref 0–6)
ERYTHROCYTE [DISTWIDTH] IN BLOOD BY AUTOMATED COUNT: 14.1 % (ref 11.6–15.1)
FERRITIN SERPL-MCNC: 42 NG/ML (ref 11–307)
HCT VFR BLD AUTO: 41 % (ref 34.8–46.1)
HDLC SERPL-MCNC: 32 MG/DL
HGB BLD-MCNC: 12.7 G/DL (ref 11.5–15.4)
IMM GRANULOCYTES # BLD AUTO: 0.04 THOUSAND/UL (ref 0–0.2)
IMM GRANULOCYTES NFR BLD AUTO: 0 % (ref 0–2)
IRON SATN MFR SERPL: 10 % (ref 15–50)
IRON SERPL-MCNC: 42 UG/DL (ref 50–212)
LDLC SERPL CALC-MCNC: 58 MG/DL (ref 0–100)
LYMPHOCYTES # BLD AUTO: 3.5 THOUSANDS/ΜL (ref 0.6–4.47)
LYMPHOCYTES NFR BLD AUTO: 30 % (ref 14–44)
MCH RBC QN AUTO: 26 PG (ref 26.8–34.3)
MCHC RBC AUTO-ENTMCNC: 31 G/DL (ref 31.4–37.4)
MCV RBC AUTO: 84 FL (ref 82–98)
MONOCYTES # BLD AUTO: 0.47 THOUSAND/ΜL (ref 0.17–1.22)
MONOCYTES NFR BLD AUTO: 4 % (ref 4–12)
NEUTROPHILS # BLD AUTO: 7.41 THOUSANDS/ΜL (ref 1.85–7.62)
NEUTS SEG NFR BLD AUTO: 62 % (ref 43–75)
NRBC BLD AUTO-RTO: 0 /100 WBCS
PLATELET # BLD AUTO: 444 THOUSANDS/UL (ref 149–390)
PMV BLD AUTO: 9.8 FL (ref 8.9–12.7)
RBC # BLD AUTO: 4.88 MILLION/UL (ref 3.81–5.12)
T4 FREE SERPL-MCNC: 0.76 NG/DL (ref 0.61–1.12)
TIBC SERPL-MCNC: 407.4 UG/DL (ref 250–450)
TRANSFERRIN SERPL-MCNC: 291 MG/DL (ref 203–362)
TRIGL SERPL-MCNC: 313 MG/DL (ref ?–150)
TSH SERPL DL<=0.05 MIU/L-ACNC: 4.07 UIU/ML (ref 0.45–4.5)
UIBC SERPL-MCNC: 365 UG/DL (ref 155–355)
WBC # BLD AUTO: 11.88 THOUSAND/UL (ref 4.31–10.16)

## 2025-01-27 PROCEDURE — 84443 ASSAY THYROID STIM HORMONE: CPT

## 2025-01-27 PROCEDURE — 85306 CLOT INHIBIT PROT S FREE: CPT

## 2025-01-27 PROCEDURE — 83540 ASSAY OF IRON: CPT

## 2025-01-27 PROCEDURE — 85303 CLOT INHIBIT PROT C ACTIVITY: CPT

## 2025-01-27 PROCEDURE — 85300 ANTITHROMBIN III ACTIVITY: CPT

## 2025-01-27 PROCEDURE — 84439 ASSAY OF FREE THYROXINE: CPT

## 2025-01-27 PROCEDURE — 86146 BETA-2 GLYCOPROTEIN ANTIBODY: CPT

## 2025-01-27 PROCEDURE — 86147 CARDIOLIPIN ANTIBODY EA IG: CPT

## 2025-01-27 PROCEDURE — 36415 COLL VENOUS BLD VENIPUNCTURE: CPT

## 2025-01-27 PROCEDURE — 86038 ANTINUCLEAR ANTIBODIES: CPT

## 2025-01-27 PROCEDURE — 85732 THROMBOPLASTIN TIME PARTIAL: CPT

## 2025-01-27 PROCEDURE — 85305 CLOT INHIBIT PROT S TOTAL: CPT

## 2025-01-27 PROCEDURE — 85025 COMPLETE CBC W/AUTO DIFF WBC: CPT

## 2025-01-27 PROCEDURE — 82728 ASSAY OF FERRITIN: CPT

## 2025-01-27 PROCEDURE — 83550 IRON BINDING TEST: CPT

## 2025-01-27 PROCEDURE — 86225 DNA ANTIBODY NATIVE: CPT

## 2025-01-27 PROCEDURE — 85705 THROMBOPLASTIN INHIBITION: CPT

## 2025-01-27 PROCEDURE — 85613 RUSSELL VIPER VENOM DILUTED: CPT

## 2025-01-27 PROCEDURE — 80061 LIPID PANEL: CPT

## 2025-01-27 PROCEDURE — 85670 THROMBIN TIME PLASMA: CPT

## 2025-01-28 ENCOUNTER — RESULTS FOLLOW-UP (OUTPATIENT)
Dept: BARIATRICS | Facility: CLINIC | Age: 30
End: 2025-01-28

## 2025-01-28 LAB
B2 GLYCOPROT1 IGA SERPL IA-ACNC: 1.1
B2 GLYCOPROT1 IGG SERPL IA-ACNC: <0.8
B2 GLYCOPROT1 IGM SERPL IA-ACNC: <2.4
CARDIOLIPIN IGA SER IA-ACNC: 1.5
CARDIOLIPIN IGG SER IA-ACNC: 1.4
CARDIOLIPIN IGM SER IA-ACNC: 1
DSDNA IGG SERPL IA-ACNC: <0.9 IU/ML (ref ?–15)
NUCLEAR IGG SER IA-RTO: 0.2 RATIO (ref ?–1)

## 2025-01-28 PROCEDURE — 88305 TISSUE EXAM BY PATHOLOGIST: CPT | Performed by: STUDENT IN AN ORGANIZED HEALTH CARE EDUCATION/TRAINING PROGRAM

## 2025-01-29 LAB
APTT SCREEN TO CONFIRM RATIO: 1.07 RATIO (ref 0–1.34)
CONFIRM APTT/NORMAL: 36.8 SEC (ref 0–47.6)
LA PPP-IMP: NORMAL
PROT S ACT/NOR PPP: 133 % (ref 61–136)
PROT S PPP-ACNC: 109 % (ref 60–150)
SCREEN APTT: 38.1 SEC (ref 0–43.5)
SCREEN DRVVT: 39.8 SEC (ref 0–47)
THROMBIN TIME: 18.7 SEC (ref 0–23)

## 2025-01-29 NOTE — TELEPHONE ENCOUNTER
Patient received the message about her results. She also wanted to tell Esha that she made an appointment with Cardiology for 2/4/25. Thank you.

## 2025-01-29 NOTE — TELEPHONE ENCOUNTER
Patient called back said that the office called her but didn't leave a message, tried to warm tx but no one was available, please call the patient if there was something else you needed from her.  Thanks.

## 2025-01-30 ENCOUNTER — RESULTS FOLLOW-UP (OUTPATIENT)
Dept: FAMILY MEDICINE CLINIC | Facility: CLINIC | Age: 30
End: 2025-01-30

## 2025-01-30 LAB
PROT C AG ACT/NOR PPP IA: >150 % OF NORMAL (ref 60–150)
PROT S ACT/NOR PPP: 120 % (ref 68–108)

## 2025-02-04 ENCOUNTER — TELEPHONE (OUTPATIENT)
Age: 30
End: 2025-02-04

## 2025-02-04 ENCOUNTER — OFFICE VISIT (OUTPATIENT)
Dept: CARDIOLOGY CLINIC | Facility: CLINIC | Age: 30
End: 2025-02-04
Payer: COMMERCIAL

## 2025-02-04 VITALS
SYSTOLIC BLOOD PRESSURE: 110 MMHG | DIASTOLIC BLOOD PRESSURE: 68 MMHG | WEIGHT: 288.8 LBS | OXYGEN SATURATION: 97 % | BODY MASS INDEX: 51.16 KG/M2 | HEART RATE: 91 BPM

## 2025-02-04 DIAGNOSIS — E78.2 MIXED HYPERLIPIDEMIA: ICD-10-CM

## 2025-02-04 DIAGNOSIS — E66.01 CLASS 3 SEVERE OBESITY DUE TO EXCESS CALORIES WITH SERIOUS COMORBIDITY AND BODY MASS INDEX (BMI) OF 50.0 TO 59.9 IN ADULT (HCC): ICD-10-CM

## 2025-02-04 DIAGNOSIS — E66.813 CLASS 3 SEVERE OBESITY DUE TO EXCESS CALORIES WITH SERIOUS COMORBIDITY AND BODY MASS INDEX (BMI) OF 50.0 TO 59.9 IN ADULT (HCC): ICD-10-CM

## 2025-02-04 DIAGNOSIS — Z01.811 PRE-OP CHEST EXAM: Primary | ICD-10-CM

## 2025-02-04 DIAGNOSIS — Z01.810 PREOP CARDIOVASCULAR EXAM: ICD-10-CM

## 2025-02-04 DIAGNOSIS — R00.0 SINUS TACHYCARDIA: ICD-10-CM

## 2025-02-04 PROCEDURE — 93000 ELECTROCARDIOGRAM COMPLETE: CPT | Performed by: INTERNAL MEDICINE

## 2025-02-04 PROCEDURE — 99214 OFFICE O/P EST MOD 30 MIN: CPT | Performed by: INTERNAL MEDICINE

## 2025-02-04 NOTE — LETTER
Cardiology Pre Operative Clearance      PRE OPERATIVE CARDIAC RISK ASSESSMENT    02/04/25    Domitila Lizvernell Landers  1995  [id number]    Date of Surgery: 03/10/2025    Type of Surgery: Bariatric Surgery    Surgeon: Dr. Dickens    No Cardiac Contraindication for Planned Surgical Procedures    Anticoagulation: None     Electronically Signed: Bryan Jane MD

## 2025-02-04 NOTE — PROGRESS NOTES
Cardiology   Domitila Landers 29 y.o. female MRN: [id number]        Impression:  Dyslipidemia - elevated triglycerides/low HDL.  On Lovaza 4gm BID.    Sinus tachycardia - stable.  Bariatric surgery - patient at acceptable cardiovascular risk to proceed with surgery.     Recommendations:  Proceed with surgery.  Recheck lipid profile in 1 year.  Follow up in one year.       HPI: Domitila Landers is a 29 y.o. year old female with dyslipidemia, sinus tachycardia, who presents for pre-operative risk stratification prior to bariatric surgery.  Asymptomatic from a cardiac standpoint. No chest pain, shortness of breath, or palpitations.  Has low HDL/high triglycerides.  Triglycerides elevated even as a teenager.  Father with significant CAD in 40s.         Review of Systems   Constitutional: Negative.    HENT: Negative.     Eyes: Negative.    Respiratory:  Negative for chest tightness and shortness of breath.    Cardiovascular:  Negative for chest pain, palpitations and leg swelling.   Gastrointestinal: Negative.    Endocrine: Negative.    Genitourinary: Negative.    Musculoskeletal: Negative.    Skin: Negative.    Allergic/Immunologic: Negative.    Neurological: Negative.    Hematological: Negative.    Psychiatric/Behavioral: Negative.     All other systems reviewed and are negative.        Past Medical History:   Diagnosis Date    Anxiety 2016    Bipolar disorder (HCC)     Chronic kidney disease     Depression     Fatty liver     Hyperlipidemia 01/13/2020    Kidney stone 2014    Leukocytosis 05/01/2020    Obesity     Self-injurious behavior     Sleep apnea     Suicide attempt (HCC)     2016     Past Surgical History:   Procedure Laterality Date    EGD      NO PAST SURGERIES       Social History     Substance and Sexual Activity   Alcohol Use Yes    Comment: 1-2 mixed drinks once monthly or less     Social History     Substance and Sexual Activity   Drug Use No     Social History     Tobacco Use   Smoking Status Never    Smokeless Tobacco Current    Types: Snuff, Chew     Family History   Problem Relation Age of Onset    Cervical cancer Mother     Heart attack Mother     Anxiety disorder Mother     Depression Mother     Diverticulitis Mother     Arthritis Mother     Heart attack Father     Hypertension Father     Hyperlipidemia Father     No Known Problems Brother     Dementia Maternal Grandmother     Alzheimer's disease Maternal Grandmother     Throat cancer Paternal Grandfather     Cancer Paternal Grandfather     Schizophrenia Maternal Aunt        Allergies:  No Known Allergies    Medications:     Current Outpatient Medications:     albuterol (ProAir HFA) 90 mcg/act inhaler, Inhale 2 puffs every 4 (four) hours as needed for wheezing or shortness of breath, Disp: 8.5 g, Rfl: 0    clonazePAM (KlonoPIN) 0.5 mg tablet, Take 0.5 tablets (0.25 mg total) by mouth 2 (two) times a day as needed for anxiety, Disp: 30 tablet, Rfl: 2    escitalopram (LEXAPRO) 10 mg tablet, Take 10 mg by mouth daily, Disp: , Rfl:     hydrocortisone 2.5 % cream, Apply topically 2 (two) times a day, Disp: 20 g, Rfl: 1    ketoconazole (NIZORAL) 2 % shampoo, APPLY 1 APPLICATION TOPICALLY 2 (TWO) TIMES A WEEK, Disp: 120 mL, Rfl: 1    omega-3-acid ethyl esters (LOVAZA) 1 g capsule, Take 2 capsules (2 g total) by mouth 2 (two) times a day, Disp: 360 capsule, Rfl: 3    topiramate (TOPAMAX) 50 MG tablet, Take 50 mg by mouth daily, Disp: , Rfl:       Wt Readings from Last 3 Encounters:   02/04/25 131 kg (288 lb 12.8 oz)   01/24/25 130 kg (286 lb 9.6 oz)   01/15/25 131 kg (289 lb)     Temp Readings from Last 3 Encounters:   01/24/25 98.1 °F (36.7 °C) (Temporal)   08/14/24 97.8 °F (36.6 °C) (Temporal)   04/15/24 97.9 °F (36.6 °C)     BP Readings from Last 3 Encounters:   02/04/25 110/68   01/24/25 113/80   08/14/24 112/74     Pulse Readings from Last 3 Encounters:   02/04/25 91   01/24/25 84   08/14/24 84         Physical Exam  HENT:      Head: Atraumatic.       "Mouth/Throat:      Mouth: Mucous membranes are moist.   Eyes:      Extraocular Movements: Extraocular movements intact.   Cardiovascular:      Rate and Rhythm: Normal rate and regular rhythm.      Heart sounds: Normal heart sounds.   Pulmonary:      Effort: Pulmonary effort is normal.      Breath sounds: Normal breath sounds.   Abdominal:      General: Abdomen is flat.   Musculoskeletal:         General: Normal range of motion.      Cervical back: Normal range of motion.   Skin:     General: Skin is warm.   Neurological:      General: No focal deficit present.      Mental Status: She is alert and oriented to person, place, and time.   Psychiatric:         Mood and Affect: Mood normal.         Behavior: Behavior normal.           Laboratory Studies:  CMP:  Lab Results   Component Value Date    K 4.3 10/29/2024     10/29/2024    CO2 24 10/29/2024    BUN 10 10/29/2024    CREATININE 0.65 10/29/2024    AST 19 10/29/2024    ALT 25 10/29/2024    EGFR 120 10/29/2024       Lipid Profile:   No results found for: \"CHOL\"  Lab Results   Component Value Date    HDL 32 (L) 01/27/2025     Lab Results   Component Value Date    LDLCALC 58 01/27/2025     Lab Results   Component Value Date    TRIG 313 (H) 01/27/2025       Cardiac testing:   EKG reviewed personally: BHARATHI Hunt          "

## 2025-02-04 NOTE — PATIENT INSTRUCTIONS
Recommendations:  Proceed with surgery.  Recheck lipid profile in 1 year.  Follow up in one year.

## 2025-02-13 NOTE — PROGRESS NOTES
Pre op. All of workflow complete at this time. Will notify coordinator. Patient reports that she had discussed a tentative date of 3/10 with coordinator. Patient will need to attend pre op class on 2/20 if insurance approves for that date. To check with coordinator on if she thinks this is still possible and will get back to patient.  To discuss recent blood work with RD- iron low, but PCP appointment not until April. To see if patient needs to start oral iron now prior to surgery. Has been using a protein powder from [employer] with 30g protein. To take picture of nutrition label to see if it is one she can use prior and after surgery to show to RD. Has been having 3 meals per day. Drinking 4-5 bottles of water daily, almond milk. Patient given number for finance to find out her out of pocket and given information for ablepay- coordinator provided.  Workflow reviewed:   Psych and/or D+A Clearance: psychiatrist paperwork received and reviewed 10/16, has been seeing a therapist weekly  Support Group: No longer required, strongly encouraged  Surgeon Appt: 5/20/24  EGD: completed 1/24  Cardiac Risk Assessment: completed 7/23- update completed 2/4/25  Sleep Studies: BARRERA+- home study completed 8/7-mild BARRERA  Blood work: completed 10/29- recent iron was drawn 1/27/25 found to be low and managed by pcp with oral iron- to make RD aware  Nicotine test: N/A  Weight loss meds: N/A  Required weight checks: No weight checks required by insurance, our program requires monthly follow up  Weight Loss: Not required, encouraged positive lifestyle changes.  Adry Bernard LCSW

## 2025-02-14 ENCOUNTER — CLINICAL SUPPORT (OUTPATIENT)
Dept: BARIATRICS | Facility: CLINIC | Age: 30
End: 2025-02-14

## 2025-02-14 ENCOUNTER — TELEPHONE (OUTPATIENT)
Dept: BARIATRICS | Facility: CLINIC | Age: 30
End: 2025-02-14

## 2025-02-14 VITALS — WEIGHT: 289.6 LBS | BODY MASS INDEX: 51.3 KG/M2

## 2025-02-14 DIAGNOSIS — Z71.89 ENCOUNTER FOR PRE-BARIATRIC SURGERY COUNSELING AND EDUCATION: Primary | ICD-10-CM

## 2025-02-14 PROCEDURE — RECHECK

## 2025-02-20 ENCOUNTER — CLINICAL SUPPORT (OUTPATIENT)
Dept: BARIATRICS | Facility: CLINIC | Age: 30
End: 2025-02-20

## 2025-02-20 DIAGNOSIS — E66.01 MORBID (SEVERE) OBESITY DUE TO EXCESS CALORIES (HCC): Primary | ICD-10-CM

## 2025-02-20 PROCEDURE — RECHECK

## 2025-02-20 NOTE — PROGRESS NOTES
Weight Management Nutrition Class     Diagnosis: Obesity    Bariatric Surgeon: Dr. Dickens    Surgery: Vertical Sleeve Gastrectomy    Class: Pt attended pre-op education session. Standardized packet of information for bariatric surgery was provided and reviewed with pt. Importance of lifestyle change and development of regular exercise routine stressed.   Pt. educated on two-week pre operative liquid protein liver shrinking diet.  Pt understands that the diet needs to be followed for 2 weeks prior to surgery. Handout reviewed.   Emphasized the need to drink 80 ounces of fluid per day while on the diet and to contact PCP to adjust any diabetes or blood pressure medicines prior to starting the diet.  Patient will not eat any solid food for 2 days prior to surgery, including 2 cups of non starchy vegetables to ensure that the stomach will be empty day of surgery. Reviewed Ensure pre-surgery ERAS drink instructions, protein supplement suggestions, post-operative clear liquid, full liquid, and pureed diet stages, post-operative nutrition rules and facts, and post-operative bariatric multivitamin/mineral recommendations and brand comparison. Reviewed instructions for stopping or tapering anti-obesity medications prior to surgery.      Pt given the opportunity to ask questions. Questions were answered. Pt verbalized understanding of all information provided. Pt appeared prepared for upcoming surgery. Contact information provided for any questions/concerns.

## 2025-02-24 ENCOUNTER — TELEPHONE (OUTPATIENT)
Age: 30
End: 2025-02-24

## 2025-02-24 ENCOUNTER — PREP FOR PROCEDURE (OUTPATIENT)
Dept: BARIATRICS | Facility: CLINIC | Age: 30
End: 2025-02-24

## 2025-02-24 DIAGNOSIS — G47.33 OBSTRUCTIVE SLEEP APNEA: ICD-10-CM

## 2025-02-24 DIAGNOSIS — E66.01 MORBID OBESITY (HCC): Primary | ICD-10-CM

## 2025-02-24 DIAGNOSIS — E78.2 MIXED HYPERLIPIDEMIA: ICD-10-CM

## 2025-02-24 NOTE — H&P (VIEW-ONLY)
H&P Exam - Bariatric Surgery   Domitila Landers 29 y.o. female MRN: [id number]  Unit/Bed#:  Encounter: [id number]      HPI:    29 y.o. yo female with morbid obesity found to be a good candidate to undergo a weight loss operation upon being enrolled here at the [facility].  She has been pre certified to undergo a Laparoscopic Sleeve Gastrectomy.    Here today to review her pre op test results.    Has been medically cleared for the procedure.    Mild BARRERA - no CPAP (She has 2 cats, 1 [place] mix)    Chronic pain requiring opioids - (No)    I have discussed with her at length the risks and benefits of the operation and reiterated the components of our multidisciplinary program and the importance of compliance and follow up in the post operative period. Although there is a great statistical chance of improvement or even resolution of most of her associated comorbidities, the results vary from patient to patient and they largely depend on his commitment.     The patient was also instructed with regards to the importance of behavior modification, nutritional counseling, support meeting attendance and lifestyle changes that are important to ensure success.    She was given the opportunity to ask questions and I have answered all of them.    I have addressed with the patient the level of CODE STATUS for this hospital stay and after explaining the different options currently she wishes to be a Level I.   She understands and wishes to proceed.      Review of Systems   Constitutional:  Negative for chills and fever. Unexpected weight change: planned weight loss.  HENT:  Negative for trouble swallowing.    Respiratory:  Negative for cough and shortness of breath.    Cardiovascular:  Negative for chest pain and palpitations.   Gastrointestinal:  Negative for abdominal pain, constipation, diarrhea, nausea and vomiting.   Neurological:  Negative for dizziness.   Psychiatric/Behavioral:          Denies anxiety and  "depression       Historical Information   Past Medical History:   Diagnosis Date    Anxiety 2016    Bipolar disorder (HCC)     Chronic kidney disease     Depression     Fatty liver     Hyperlipidemia 01/13/2020    Kidney stone 2014    Leukocytosis 05/01/2020    Obesity     Self-injurious behavior     Sleep apnea     Suicide attempt (HCC)     2016     Past Surgical History:   Procedure Laterality Date    EGD      NO PAST SURGERIES       Social History   Social History     Substance and Sexual Activity   Alcohol Use Not Currently    Comment: 1-2 mixed drinks once monthly or less     Social History     Substance and Sexual Activity   Drug Use No     Social History     Tobacco Use   Smoking Status Never   Smokeless Tobacco Current    Types: Snuff, Chew     Family History: Family history non-contributory    Meds/Allergies   all medications and allergies reviewed  No Known Allergies    Objective     Current Vitals:   Blood Pressure: 112/84 (02/27/25 1358)  Pulse: 76 (02/27/25 1358)  Respirations: 16 (02/27/25 1358)  Height: 5' 3.54\" (161.4 cm) (02/27/25 1358)  Weight - Scale: 127 kg (281 lb) (02/27/25 1358)      Invasive Devices       None                   Physical Exam  Vitals reviewed.   Constitutional:       General: She is not in acute distress.     Appearance: She is well-developed.      Comments: obese   HENT:      Head: Normocephalic and atraumatic.   Eyes:      General: No scleral icterus.  Cardiovascular:      Rate and Rhythm: Normal rate and regular rhythm.      Heart sounds: Normal heart sounds.   Pulmonary:      Effort: No respiratory distress.      Breath sounds: Normal breath sounds.   Abdominal:      General: Bowel sounds are normal.      Palpations: Abdomen is soft.      Tenderness: There is no abdominal tenderness.   Neurological:      Mental Status: She is alert and oriented to person, place, and time.         Lab Results: I have personally reviewed pertinent lab results.    Imaging: Results Review " Statement: No pertinent imaging studies reviewed.    EGD on 01/24/25:  Normal    EKG, Pathology, and Other Studies: Results Review Statement: No pertinent imaging studies reviewed.    Code Status: Level 1    Assessment/Plan:    29 y.o. yo female with morbid obesity found to be a good candidate to undergo a weight loss operation upon being enrolled here at the [facility]. She has completed all of the preoperative process for bariatric surgery.    - Plan for laparoscopic possible open Sleeve Gastrectomy with intraoperative EGD  - consent signed  - preop orders placed

## 2025-02-24 NOTE — PROGRESS NOTES
H&P Exam - Bariatric Surgery   Domitila Landers 29 y.o. female MRN: [id number]  Unit/Bed#:  Encounter: [id number]      HPI:    29 y.o. yo female with morbid obesity found to be a good candidate to undergo a weight loss operation upon being enrolled here at the [facility].  She has been pre certified to undergo a Laparoscopic Sleeve Gastrectomy.    Here today to review her pre op test results.    Has been medically cleared for the procedure.    Mild BARRERA - no CPAP (She has 2 cats, 1 [place] mix)    Chronic pain requiring opioids - (No)    I have discussed with her at length the risks and benefits of the operation and reiterated the components of our multidisciplinary program and the importance of compliance and follow up in the post operative period. Although there is a great statistical chance of improvement or even resolution of most of her associated comorbidities, the results vary from patient to patient and they largely depend on his commitment.     The patient was also instructed with regards to the importance of behavior modification, nutritional counseling, support meeting attendance and lifestyle changes that are important to ensure success.    She was given the opportunity to ask questions and I have answered all of them.    I have addressed with the patient the level of CODE STATUS for this hospital stay and after explaining the different options currently she wishes to be a Level I.   She understands and wishes to proceed.      Review of Systems   Constitutional:  Negative for chills and fever. Unexpected weight change: planned weight loss.  HENT:  Negative for trouble swallowing.    Respiratory:  Negative for cough and shortness of breath.    Cardiovascular:  Negative for chest pain and palpitations.   Gastrointestinal:  Negative for abdominal pain, constipation, diarrhea, nausea and vomiting.   Neurological:  Negative for dizziness.   Psychiatric/Behavioral:          Denies anxiety and  "depression       Historical Information   Past Medical History:   Diagnosis Date    Anxiety 2016    Bipolar disorder (HCC)     Chronic kidney disease     Depression     Fatty liver     Hyperlipidemia 01/13/2020    Kidney stone 2014    Leukocytosis 05/01/2020    Obesity     Self-injurious behavior     Sleep apnea     Suicide attempt (HCC)     2016     Past Surgical History:   Procedure Laterality Date    EGD      NO PAST SURGERIES       Social History   Social History     Substance and Sexual Activity   Alcohol Use Not Currently    Comment: 1-2 mixed drinks once monthly or less     Social History     Substance and Sexual Activity   Drug Use No     Social History     Tobacco Use   Smoking Status Never   Smokeless Tobacco Current    Types: Snuff, Chew     Family History: Family history non-contributory    Meds/Allergies   all medications and allergies reviewed  No Known Allergies    Objective     Current Vitals:   Blood Pressure: 112/84 (02/27/25 1358)  Pulse: 76 (02/27/25 1358)  Respirations: 16 (02/27/25 1358)  Height: 5' 3.54\" (161.4 cm) (02/27/25 1358)  Weight - Scale: 127 kg (281 lb) (02/27/25 1358)      Invasive Devices       None                   Physical Exam  Vitals reviewed.   Constitutional:       General: She is not in acute distress.     Appearance: She is well-developed.      Comments: obese   HENT:      Head: Normocephalic and atraumatic.   Eyes:      General: No scleral icterus.  Cardiovascular:      Rate and Rhythm: Normal rate and regular rhythm.      Heart sounds: Normal heart sounds.   Pulmonary:      Effort: No respiratory distress.      Breath sounds: Normal breath sounds.   Abdominal:      General: Bowel sounds are normal.      Palpations: Abdomen is soft.      Tenderness: There is no abdominal tenderness.   Neurological:      Mental Status: She is alert and oriented to person, place, and time.         Lab Results: I have personally reviewed pertinent lab results.    Imaging: Results Review " Statement: No pertinent imaging studies reviewed.    EGD on 01/24/25:  Normal    EKG, Pathology, and Other Studies: Results Review Statement: No pertinent imaging studies reviewed.    Code Status: Level 1    Assessment/Plan:    29 y.o. yo female with morbid obesity found to be a good candidate to undergo a weight loss operation upon being enrolled here at the [facility]. She has completed all of the preoperative process for bariatric surgery.    - Plan for laparoscopic possible open Sleeve Gastrectomy with intraoperative EGD  - consent signed  - preop orders placed

## 2025-02-25 ENCOUNTER — APPOINTMENT (OUTPATIENT)
Dept: PREADMISSION TESTING | Facility: HOSPITAL | Age: 30
End: 2025-02-25
Payer: COMMERCIAL

## 2025-02-27 ENCOUNTER — OFFICE VISIT (OUTPATIENT)
Dept: BARIATRICS | Facility: CLINIC | Age: 30
End: 2025-02-27
Payer: COMMERCIAL

## 2025-02-27 VITALS
BODY MASS INDEX: 47.97 KG/M2 | HEIGHT: 64 IN | RESPIRATION RATE: 16 BRPM | WEIGHT: 281 LBS | DIASTOLIC BLOOD PRESSURE: 84 MMHG | HEART RATE: 76 BPM | SYSTOLIC BLOOD PRESSURE: 112 MMHG

## 2025-02-27 DIAGNOSIS — E66.01 CLASS 3 SEVERE OBESITY DUE TO EXCESS CALORIES WITH SERIOUS COMORBIDITY AND BODY MASS INDEX (BMI) OF 50.0 TO 59.9 IN ADULT (HCC): ICD-10-CM

## 2025-02-27 DIAGNOSIS — E66.813 CLASS 3 SEVERE OBESITY DUE TO EXCESS CALORIES WITH SERIOUS COMORBIDITY AND BODY MASS INDEX (BMI) OF 50.0 TO 59.9 IN ADULT (HCC): ICD-10-CM

## 2025-02-27 DIAGNOSIS — Z01.818 OTHER SPECIFIED PRE-OPERATIVE EXAMINATION: Primary | ICD-10-CM

## 2025-02-27 DIAGNOSIS — E78.2 MIXED HYPERLIPIDEMIA: ICD-10-CM

## 2025-02-27 DIAGNOSIS — E03.8 SUBCLINICAL HYPOTHYROIDISM: ICD-10-CM

## 2025-02-27 DIAGNOSIS — R00.0 SINUS TACHYCARDIA: ICD-10-CM

## 2025-02-27 DIAGNOSIS — G47.33 OSA (OBSTRUCTIVE SLEEP APNEA): ICD-10-CM

## 2025-02-27 PROCEDURE — 99214 OFFICE O/P EST MOD 30 MIN: CPT | Performed by: PHYSICIAN ASSISTANT

## 2025-02-27 RX ORDER — ACETAMINOPHEN 10 MG/ML
1000 INJECTION, SOLUTION INTRAVENOUS ONCE
OUTPATIENT
Start: 2025-02-27 | End: 2025-02-27

## 2025-02-27 RX ORDER — ENOXAPARIN SODIUM 300 MG/3ML
40 INJECTION INTRAVENOUS; SUBCUTANEOUS ONCE
OUTPATIENT
Start: 2025-02-27 | End: 2025-02-27

## 2025-02-27 RX ORDER — CELECOXIB 200 MG/1
200 CAPSULE ORAL ONCE
OUTPATIENT
Start: 2025-02-27 | End: 2025-02-27

## 2025-02-27 RX ORDER — OMEPRAZOLE 20 MG/1
20 CAPSULE, DELAYED RELEASE ORAL DAILY
Qty: 90 CAPSULE | Refills: 1 | Status: SHIPPED | OUTPATIENT
Start: 2025-02-27

## 2025-02-27 NOTE — PRE-PROCEDURE INSTRUCTIONS
Pre-Surgery Instructions:   Medication Instructions    albuterol (ProAir HFA) 90 mcg/act inhaler Uses PRN- OK to take day of surgery    clonazePAM (KlonoPIN) 0.5 mg tablet Take day of surgery.    escitalopram (LEXAPRO) 10 mg tablet Take day of surgery.    hydrocortisone 2.5 % cream Hold day of surgery.    ketoconazole (NIZORAL) 2 % shampoo Take day of surgery.    omega-3-acid ethyl esters (LOVAZA) 1 g capsule Stop taking 7 days prior to surgery.    topiramate (TOPAMAX) 50 MG tablet Take day of surgery.   Medication instructions for day of surgery reviewed. Please take all instructed medications with only a sip of water.       You will receive a call one business day prior to surgery with an arrival time and hospital directions. If your surgery is scheduled on a Monday, the hospital will be calling you on the Friday prior to your surgery. If you have not heard from anyone by 8pm, please call the hospital supervisor through the hospital  at [phone]. ([place] [phone] or [place] [phone]).    Do not eat or drink anything after midnight the night before your surgery, including candy, mints, lifesavers, or chewing gum. Do not drink alcohol 24hrs before your surgery. Try not to smoke at least 24hrs before your surgery.       Follow the pre surgery showering instructions as listed in the “My Surgical Experience Booklet” or otherwise provided by your surgeon's office. Do not use a blade to shave the surgical area 1 week before surgery. It is okay to use a clean electric clippers up to 24 hours before surgery. Do not apply any lotions, creams, including makeup, cologne, deodorant, or perfumes after showering on the day of your surgery. Do not use dry shampoo, hair spray, hair gel, or any type of hair products.     No contact lenses, eye make-up, or artificial eyelashes. Remove nail polish, including gel polish, and any artificial, gel, or acrylic nails if possible. Remove all jewelry including rings  and body piercing jewelry.     Wear causal clothing that is easy to take on and off. Consider your type of surgery.    Keep any valuables, jewelry, piercings at home. Please bring any specially ordered equipment (sling, braces) if indicated.    Arrange for a responsible person to drive you to and from the hospital on the day of your surgery. Please confirm the visitor policy for the day of your procedure when you receive your phone call with an arrival time.     Call the surgeon's office with any new illnesses, exposures, or additional questions prior to surgery.    Please reference your “My Surgical Experience Booklet” for additional information to prepare for your upcoming surgery.

## 2025-03-07 ENCOUNTER — TELEPHONE (OUTPATIENT)
Dept: BARIATRICS | Facility: CLINIC | Age: 30
End: 2025-03-07

## 2025-03-10 ENCOUNTER — HOSPITAL ENCOUNTER (OUTPATIENT)
Facility: HOSPITAL | Age: 30
Setting detail: OUTPATIENT SURGERY
Discharge: HOME | End: 2025-03-11
Attending: SURGERY | Admitting: SURGERY
Payer: COMMERCIAL

## 2025-03-10 ENCOUNTER — ANESTHESIA EVENT (OUTPATIENT)
Dept: PERIOP | Facility: HOSPITAL | Age: 30
End: 2025-03-10
Payer: COMMERCIAL

## 2025-03-10 ENCOUNTER — ANESTHESIA (OUTPATIENT)
Dept: PERIOP | Facility: HOSPITAL | Age: 30
End: 2025-03-10
Payer: COMMERCIAL

## 2025-03-10 DIAGNOSIS — E66.01 MORBID OBESITY (HCC): ICD-10-CM

## 2025-03-10 DIAGNOSIS — E78.2 MIXED HYPERLIPIDEMIA: ICD-10-CM

## 2025-03-10 DIAGNOSIS — G47.33 OBSTRUCTIVE SLEEP APNEA: ICD-10-CM

## 2025-03-10 DIAGNOSIS — L76.82 INCISIONAL PAIN: Primary | ICD-10-CM

## 2025-03-10 PROBLEM — E66.813 CLASS 3 OBESITY: Status: ACTIVE | Noted: 2025-03-10

## 2025-03-10 LAB
EXT PREGNANCY TEST URINE: NEGATIVE
EXT. CONTROL: NORMAL

## 2025-03-10 PROCEDURE — 81025 URINE PREGNANCY TEST: CPT | Performed by: SURGERY

## 2025-03-10 PROCEDURE — 88307 TISSUE EXAM BY PATHOLOGIST: CPT | Performed by: STUDENT IN AN ORGANIZED HEALTH CARE EDUCATION/TRAINING PROGRAM

## 2025-03-10 PROCEDURE — 43775 LAP SLEEVE GASTRECTOMY: CPT | Performed by: PHYSICIAN ASSISTANT

## 2025-03-10 PROCEDURE — 43775 LAP SLEEVE GASTRECTOMY: CPT | Performed by: SURGERY

## 2025-03-10 RX ORDER — OXYCODONE HCL 5 MG/5 ML
5 SOLUTION, ORAL ORAL EVERY 4 HOURS PRN
Status: DISCONTINUED | OUTPATIENT
Start: 2025-03-10 | End: 2025-03-11 | Stop reason: HOSPADM

## 2025-03-10 RX ORDER — HYDROMORPHONE HCL/PF 1 MG/ML
1 SYRINGE (ML) INJECTION EVERY 4 HOURS PRN
Status: DISCONTINUED | OUTPATIENT
Start: 2025-03-10 | End: 2025-03-11 | Stop reason: HOSPADM

## 2025-03-10 RX ORDER — LIDOCAINE HYDROCHLORIDE 10 MG/ML
INJECTION, SOLUTION EPIDURAL; INFILTRATION; INTRACAUDAL; PERINEURAL AS NEEDED
Status: DISCONTINUED | OUTPATIENT
Start: 2025-03-10 | End: 2025-03-10

## 2025-03-10 RX ORDER — ROCURONIUM BROMIDE 10 MG/ML
INJECTION, SOLUTION INTRAVENOUS AS NEEDED
Status: DISCONTINUED | OUTPATIENT
Start: 2025-03-10 | End: 2025-03-10

## 2025-03-10 RX ORDER — ACETAMINOPHEN 10 MG/ML
1000 INJECTION, SOLUTION INTRAVENOUS EVERY 8 HOURS SCHEDULED
Status: DISCONTINUED | OUTPATIENT
Start: 2025-03-10 | End: 2025-03-11 | Stop reason: HOSPADM

## 2025-03-10 RX ORDER — PROPOFOL 10 MG/ML
INJECTION, EMULSION INTRAVENOUS AS NEEDED
Status: DISCONTINUED | OUTPATIENT
Start: 2025-03-10 | End: 2025-03-10

## 2025-03-10 RX ORDER — SODIUM CHLORIDE, SODIUM LACTATE, POTASSIUM CHLORIDE, CALCIUM CHLORIDE 600; 310; 30; 20 MG/100ML; MG/100ML; MG/100ML; MG/100ML
INJECTION, SOLUTION INTRAVENOUS CONTINUOUS PRN
Status: DISCONTINUED | OUTPATIENT
Start: 2025-03-10 | End: 2025-03-10

## 2025-03-10 RX ORDER — METOCLOPRAMIDE HYDROCHLORIDE 5 MG/ML
10 INJECTION INTRAMUSCULAR; INTRAVENOUS ONCE AS NEEDED
Status: COMPLETED | OUTPATIENT
Start: 2025-03-10 | End: 2025-03-10

## 2025-03-10 RX ORDER — DEXAMETHASONE SODIUM PHOSPHATE 10 MG/ML
INJECTION, SOLUTION INTRAMUSCULAR; INTRAVENOUS AS NEEDED
Status: DISCONTINUED | OUTPATIENT
Start: 2025-03-10 | End: 2025-03-10

## 2025-03-10 RX ORDER — ONDANSETRON 2 MG/ML
4 INJECTION INTRAMUSCULAR; INTRAVENOUS EVERY 6 HOURS PRN
Status: DISCONTINUED | OUTPATIENT
Start: 2025-03-10 | End: 2025-03-11 | Stop reason: HOSPADM

## 2025-03-10 RX ORDER — MIDAZOLAM HYDROCHLORIDE 2 MG/2ML
INJECTION, SOLUTION INTRAMUSCULAR; INTRAVENOUS AS NEEDED
Status: DISCONTINUED | OUTPATIENT
Start: 2025-03-10 | End: 2025-03-10

## 2025-03-10 RX ORDER — ACETAMINOPHEN 10 MG/ML
1000 INJECTION, SOLUTION INTRAVENOUS ONCE
Status: COMPLETED | OUTPATIENT
Start: 2025-03-10 | End: 2025-03-10

## 2025-03-10 RX ORDER — DIPHENHYDRAMINE HCL 25 MG
25 TABLET ORAL
Status: DISCONTINUED | OUTPATIENT
Start: 2025-03-10 | End: 2025-03-11 | Stop reason: HOSPADM

## 2025-03-10 RX ORDER — HYDROMORPHONE HCL/PF 1 MG/ML
0.5 SYRINGE (ML) INJECTION
Status: DISCONTINUED | OUTPATIENT
Start: 2025-03-10 | End: 2025-03-10 | Stop reason: HOSPADM

## 2025-03-10 RX ORDER — PROPOFOL 10 MG/ML
INJECTION, EMULSION INTRAVENOUS CONTINUOUS PRN
Status: DISCONTINUED | OUTPATIENT
Start: 2025-03-10 | End: 2025-03-10

## 2025-03-10 RX ORDER — ALBUTEROL SULFATE 90 UG/1
2 INHALANT RESPIRATORY (INHALATION) EVERY 4 HOURS PRN
Status: DISCONTINUED | OUTPATIENT
Start: 2025-03-10 | End: 2025-03-11 | Stop reason: HOSPADM

## 2025-03-10 RX ORDER — KETOROLAC TROMETHAMINE 30 MG/ML
15 INJECTION, SOLUTION INTRAMUSCULAR; INTRAVENOUS EVERY 6 HOURS SCHEDULED
Status: DISCONTINUED | OUTPATIENT
Start: 2025-03-10 | End: 2025-03-11 | Stop reason: HOSPADM

## 2025-03-10 RX ORDER — SODIUM CHLORIDE 9 MG/ML
INJECTION INTRAVENOUS AS NEEDED
Status: DISCONTINUED | OUTPATIENT
Start: 2025-03-10 | End: 2025-03-10 | Stop reason: HOSPADM

## 2025-03-10 RX ORDER — FAMOTIDINE 10 MG/ML
20 INJECTION, SOLUTION INTRAVENOUS EVERY 12 HOURS SCHEDULED
Status: DISCONTINUED | OUTPATIENT
Start: 2025-03-10 | End: 2025-03-11 | Stop reason: HOSPADM

## 2025-03-10 RX ORDER — METOCLOPRAMIDE HYDROCHLORIDE 5 MG/ML
10 INJECTION INTRAMUSCULAR; INTRAVENOUS EVERY 6 HOURS PRN
Status: DISCONTINUED | OUTPATIENT
Start: 2025-03-10 | End: 2025-03-11 | Stop reason: HOSPADM

## 2025-03-10 RX ORDER — SODIUM CHLORIDE, SODIUM LACTATE, POTASSIUM CHLORIDE, CALCIUM CHLORIDE 600; 310; 30; 20 MG/100ML; MG/100ML; MG/100ML; MG/100ML
100 INJECTION, SOLUTION INTRAVENOUS CONTINUOUS
Status: DISCONTINUED | OUTPATIENT
Start: 2025-03-10 | End: 2025-03-11 | Stop reason: HOSPADM

## 2025-03-10 RX ORDER — ESCITALOPRAM OXALATE 10 MG/1
10 TABLET ORAL DAILY
Status: DISCONTINUED | OUTPATIENT
Start: 2025-03-11 | End: 2025-03-11 | Stop reason: HOSPADM

## 2025-03-10 RX ORDER — OXYCODONE HCL 5 MG/5 ML
10 SOLUTION, ORAL ORAL EVERY 4 HOURS PRN
Status: DISCONTINUED | OUTPATIENT
Start: 2025-03-10 | End: 2025-03-11 | Stop reason: HOSPADM

## 2025-03-10 RX ORDER — FENTANYL CITRATE/PF 50 MCG/ML
50 SYRINGE (ML) INJECTION
Status: DISCONTINUED | OUTPATIENT
Start: 2025-03-10 | End: 2025-03-10 | Stop reason: HOSPADM

## 2025-03-10 RX ORDER — PROMETHAZINE HYDROCHLORIDE 25 MG/ML
25 INJECTION, SOLUTION INTRAMUSCULAR; INTRAVENOUS EVERY 6 HOURS PRN
Status: DISCONTINUED | OUTPATIENT
Start: 2025-03-10 | End: 2025-03-11 | Stop reason: HOSPADM

## 2025-03-10 RX ORDER — CELECOXIB 200 MG/1
200 CAPSULE ORAL ONCE
Status: COMPLETED | OUTPATIENT
Start: 2025-03-10 | End: 2025-03-10

## 2025-03-10 RX ORDER — EPHEDRINE SULFATE 50 MG/ML
INJECTION INTRAVENOUS AS NEEDED
Status: DISCONTINUED | OUTPATIENT
Start: 2025-03-10 | End: 2025-03-10

## 2025-03-10 RX ORDER — ENOXAPARIN SODIUM 100 MG/ML
40 INJECTION SUBCUTANEOUS ONCE
Status: COMPLETED | OUTPATIENT
Start: 2025-03-10 | End: 2025-03-10

## 2025-03-10 RX ORDER — MAGNESIUM HYDROXIDE 1200 MG/15ML
LIQUID ORAL AS NEEDED
Status: DISCONTINUED | OUTPATIENT
Start: 2025-03-10 | End: 2025-03-10 | Stop reason: HOSPADM

## 2025-03-10 RX ORDER — BACLOFEN 10 MG/1
10 TABLET ORAL 3 TIMES DAILY
Status: DISCONTINUED | OUTPATIENT
Start: 2025-03-10 | End: 2025-03-11 | Stop reason: HOSPADM

## 2025-03-10 RX ORDER — SIMETHICONE 80 MG
80 TABLET,CHEWABLE ORAL EVERY 12 HOURS SCHEDULED
Status: DISCONTINUED | OUTPATIENT
Start: 2025-03-10 | End: 2025-03-11 | Stop reason: HOSPADM

## 2025-03-10 RX ORDER — LORAZEPAM 2 MG/ML
0.5 INJECTION INTRAMUSCULAR EVERY 6 HOURS PRN
Status: DISCONTINUED | OUTPATIENT
Start: 2025-03-10 | End: 2025-03-11 | Stop reason: HOSPADM

## 2025-03-10 RX ORDER — FENTANYL CITRATE 50 UG/ML
INJECTION, SOLUTION INTRAMUSCULAR; INTRAVENOUS AS NEEDED
Status: DISCONTINUED | OUTPATIENT
Start: 2025-03-10 | End: 2025-03-10

## 2025-03-10 RX ORDER — BUPIVACAINE HYDROCHLORIDE 5 MG/ML
INJECTION, SOLUTION EPIDURAL; INTRACAUDAL AS NEEDED
Status: DISCONTINUED | OUTPATIENT
Start: 2025-03-10 | End: 2025-03-10 | Stop reason: HOSPADM

## 2025-03-10 RX ORDER — TOPIRAMATE 25 MG/1
50 TABLET, FILM COATED ORAL DAILY
Status: DISCONTINUED | OUTPATIENT
Start: 2025-03-10 | End: 2025-03-11 | Stop reason: HOSPADM

## 2025-03-10 RX ORDER — ENOXAPARIN SODIUM 100 MG/ML
40 INJECTION SUBCUTANEOUS DAILY
Status: DISCONTINUED | OUTPATIENT
Start: 2025-03-11 | End: 2025-03-11 | Stop reason: HOSPADM

## 2025-03-10 RX ADMIN — SIMETHICONE 80 MG: 80 TABLET, CHEWABLE ORAL at 16:59

## 2025-03-10 RX ADMIN — BACLOFEN 10 MG: 10 TABLET ORAL at 21:22

## 2025-03-10 RX ADMIN — PROPOFOL 120 MCG/KG/MIN: 10 INJECTION, EMULSION INTRAVENOUS at 09:57

## 2025-03-10 RX ADMIN — Medication 3000 MG: at 10:03

## 2025-03-10 RX ADMIN — TOPIRAMATE 50 MG: 25 TABLET, FILM COATED ORAL at 18:26

## 2025-03-10 RX ADMIN — DEXAMETHASONE SODIUM PHOSPHATE 10 MG: 10 INJECTION, SOLUTION INTRAMUSCULAR; INTRAVENOUS at 09:57

## 2025-03-10 RX ADMIN — SODIUM CHLORIDE, SODIUM LACTATE, POTASSIUM CHLORIDE, AND CALCIUM CHLORIDE 100 ML/HR: .6; .31; .03; .02 INJECTION, SOLUTION INTRAVENOUS at 15:34

## 2025-03-10 RX ADMIN — ROCURONIUM BROMIDE 50 MG: 10 INJECTION, SOLUTION INTRAVENOUS at 09:52

## 2025-03-10 RX ADMIN — HYDROMORPHONE HYDROCHLORIDE 0.5 MG: 1 INJECTION, SOLUTION INTRAMUSCULAR; INTRAVENOUS; SUBCUTANEOUS at 12:57

## 2025-03-10 RX ADMIN — ONDANSETRON 4 MG: 2 INJECTION INTRAMUSCULAR; INTRAVENOUS at 15:26

## 2025-03-10 RX ADMIN — PROPOFOL 300 MG: 10 INJECTION, EMULSION INTRAVENOUS at 09:52

## 2025-03-10 RX ADMIN — SODIUM CHLORIDE, SODIUM LACTATE, POTASSIUM CHLORIDE, CALCIUM CHLORIDE: 600; 310; 30; 20 INJECTION, SOLUTION INTRAVENOUS at 09:50

## 2025-03-10 RX ADMIN — FENTANYL CITRATE 50 MCG: 0.05 INJECTION, SOLUTION INTRAMUSCULAR; INTRAVENOUS at 12:00

## 2025-03-10 RX ADMIN — MIDAZOLAM HYDROCHLORIDE 2 MG: 2 INJECTION, SOLUTION INTRAMUSCULAR; INTRAVENOUS at 09:50

## 2025-03-10 RX ADMIN — FENTANYL CITRATE 50 MCG: 0.05 INJECTION, SOLUTION INTRAMUSCULAR; INTRAVENOUS at 12:25

## 2025-03-10 RX ADMIN — METOCLOPRAMIDE 10 MG: 5 INJECTION, SOLUTION INTRAMUSCULAR; INTRAVENOUS at 13:57

## 2025-03-10 RX ADMIN — KETOROLAC TROMETHAMINE 15 MG: 30 INJECTION, SOLUTION INTRAMUSCULAR at 17:00

## 2025-03-10 RX ADMIN — ACETAMINOPHEN 1000 MG: 1000 INJECTION, SOLUTION INTRAVENOUS at 15:36

## 2025-03-10 RX ADMIN — BACLOFEN 10 MG: 10 TABLET ORAL at 16:59

## 2025-03-10 RX ADMIN — EPHEDRINE SULFATE 10 MG: 50 INJECTION INTRAVENOUS at 10:16

## 2025-03-10 RX ADMIN — FAMOTIDINE 20 MG: 10 INJECTION, SOLUTION INTRAVENOUS at 21:22

## 2025-03-10 RX ADMIN — CELECOXIB 200 MG: 200 CAPSULE ORAL at 07:57

## 2025-03-10 RX ADMIN — ACETAMINOPHEN 1000 MG: 10 INJECTION INTRAVENOUS at 08:02

## 2025-03-10 RX ADMIN — ENOXAPARIN SODIUM 40 MG: 40 INJECTION SUBCUTANEOUS at 08:05

## 2025-03-10 RX ADMIN — FENTANYL CITRATE 100 MCG: 50 INJECTION, SOLUTION INTRAMUSCULAR; INTRAVENOUS at 09:52

## 2025-03-10 RX ADMIN — FOSAPREPITANT 150 MG: 150 INJECTION, POWDER, LYOPHILIZED, FOR SOLUTION INTRAVENOUS at 07:59

## 2025-03-10 RX ADMIN — LIDOCAINE HYDROCHLORIDE 50 MG: 10 INJECTION, SOLUTION EPIDURAL; INFILTRATION; INTRACAUDAL; PERINEURAL at 09:52

## 2025-03-10 NOTE — DISCHARGE INSTR - AVS FIRST PAGE
Bariatric/Weight Loss Surgery  Hospital Discharge Instructions  ACTIVITY:  Progress as feels comfortable - a good rule is:  if you are doing something and it begins to hurt, stop doing the activity. Walk every hour while at home.  You may walk stairs if you do so slowly  You may shower 48 hours after surgery.  Use your incentive spirometer 10 times per hour while awake for 1 week  Do NOT drive for 48 hours after surgery. No driving 24 hours after taking certain prescription pain medications . Examples of such medication are Percocet, Darvocet, Oxycodone, Tylenol #3, and Tylenol with Codeine. Follow your pharmacist’s orders.    DIET  Stay on a liquid diet for 7 days after your surgery date, sipping slowly. Refer to your manual for examples of choices. Remember to keep your liquids sugar free or low calorie. You may have protein drinks. Make sure to drink 48 to 64 ounces per day of fluids.   You may advance to a pureed diet one week after surgery as instructed by your diet progression pamphlet. Once you get approval from your surgeon at your first post operative visit you may advance to the soft diet.     MEDICATIONS:  The abdominal nerve block will wear off during the first 1-2 days that you are home, and you may become sore. Continue to take your Tylenol and your pain medication as instructed.   Start vitamins and minerals per Latanya's instructions  Anti-acid Medication as per prescription.  Other medications as indicated on the Physician Patient Discharge Instructions form given to you at the time of discharge.  You will need to consult with your Family Doctor in regards to all your prescribed medication, particularly those for blood pressure and diabetes.  As you lose weight, medical conditions may change, requiring an alteration or elimination of the drug dose.   DO NOT TAKE BIRTH CONTROL(BC) MEDICATIONS, INSERT BC VAGINAL RINGS, OR PLACE IUD OR ANY OTHER BC METHODS UNTIL 31 DAYS FROM DAY OF DISCHARGE FROM  HOSPITAL. THIS PLACES YOU AT HIGH RISK FOR A POTENTIALLY LIFE THREATENING BLOOD CLOT. Remember to always use barrier methods for birth control and speak to your GYN about using two forms of birth control to start 31 days after surgery. It is very important to avoid pregnancy until at least 18-24 months after surgery.       INCISION CARE  You may shower and get incisions wet 2 days after surgery. No soaking tub baths or swimming for 30 days after surgery. Keep abdominal area and incisions clean. Use soap and water to create a good lather and rinse off. Do not scrub incisions.   If you have a drain, empty the drain as the nurses instructed.    FOLLOW-UP APPOINTMENT should be made for one week after discharge. Call surgeon’s office at [phone] to schedule an appointment.    CALL YOUR DOCTOR FOR:  pain not controlled by pain medications, a temperature greater than 101.5° F, any increase or change in drainage or redness from any incision, any vomiting or inability to keep liquids down, shortness of breath, shoulder pain, or bleeding

## 2025-03-10 NOTE — ANESTHESIA PREPROCEDURE EVALUATION
Procedure:  GASTRECTOMY SLEEVE LAPAROSCOPIC AND INTRAOPERATIVE EGD (Abdomen)    Relevant Problems   CARDIO   (+) Mixed hyperlipidemia      ENDO   (+) Subclinical hypothyroidism      NEURO/PSYCH   (+) Bipolar affective disorder, current episode depressed (HCC)   (+) SUSANNA (generalized anxiety disorder)   (+) PTSD (post-traumatic stress disorder)      PULMONARY   (+) BARRERA (obstructive sleep apnea)        Physical Exam    Airway    Mallampati score: III  TM Distance: >3 FB  Neck ROM: full     Dental   No notable dental hx     Cardiovascular      Pulmonary      Other Findings  post-pubertal.      Anesthesia Plan  ASA Score- 3     Anesthesia Type- general with ASA Monitors.         Additional Monitors:     Airway Plan: ETT.           Plan Factors-Exercise tolerance (METS): >4 METS.    Chart reviewed. EKG reviewed.  Existing labs reviewed. Patient summary reviewed.    Patient is not a current smoker.      There is medical exclusion for perioperative obstructive sleep apnea risk education.        Induction- intravenous.    Postoperative Plan- Plan for postoperative opioid use.         Informed Consent- Anesthetic plan and risks discussed with patient.  I personally reviewed this patient with the CRNA. Discussed and agreed on the Anesthesia Plan with the CRNA..      NPO Status:  Vitals Value Taken Time   Date of last liquid     Time of last liquid     Date of last solid 03/06/25 03/10/25 0748   Time of last solid 1200 03/10/25 0748

## 2025-03-10 NOTE — ANESTHESIA POSTPROCEDURE EVALUATION
Post-Op Assessment Note    CV Status:  Stable  Pain Score: 0    Pain management: adequate    Multimodal analgesia used between 6 hours prior to anesthesia start to PACU discharge    Mental Status:  Sleepy   Hydration Status:  Stable   PONV Controlled:  None   Airway Patency:  Patent  Airway: intubated     Post Op Vitals Reviewed: Yes    No anethesia notable event occurred.    Staff: CRNA           Last Filed PACU Vitals:  Vitals Value Taken Time   Temp 98    Pulse 82 03/10/25 1117   /54 03/10/25 1118   Resp 16    SpO2 95 % 03/10/25 1117   Vitals shown include unfiled device data.

## 2025-03-10 NOTE — PLAN OF CARE
Problem: PAIN - ADULT  Goal: Verbalizes/displays adequate comfort level or baseline comfort level  Description: Interventions:  - Encourage patient to monitor pain and request assistance  - Assess pain using appropriate pain scale  - Administer analgesics based on type and severity of pain and evaluate response  - Implement non-pharmacological measures as appropriate and evaluate response  - Consider cultural and social influences on pain and pain management  - Notify physician/advanced practitioner if interventions unsuccessful or patient reports new pain  Outcome: Progressing     Problem: INFECTION - ADULT  Goal: Absence or prevention of progression during hospitalization  Description: INTERVENTIONS:  - Assess and monitor for signs and symptoms of infection  - Monitor lab/diagnostic results  - Monitor all insertion sites, i.e. indwelling lines, tubes, and drains  - Monitor endotracheal if appropriate and nasal secretions for changes in amount and color  - [place] appropriate cooling/warming therapies per order  - Administer medications as ordered  - Instruct and encourage patient and family to use good hand hygiene technique  - Identify and instruct in appropriate isolation precautions for identified infection/condition  Outcome: Progressing  Goal: Absence of fever/infection during neutropenic period  Description: INTERVENTIONS:  - Monitor WBC    Outcome: Progressing     Problem: SAFETY ADULT  Goal: Patient will remain free of falls  Description: INTERVENTIONS:  - Educate patient/family on patient safety including physical limitations  - Instruct patient to call for assistance with activity   - Consult OT/PT to assist with strengthening/mobility   - Keep Call bell within reach  - Keep bed low and locked with side rails adjusted as appropriate  - Keep care items and personal belongings within reach  - Initiate and maintain comfort rounds  - Make Fall Risk Sign visible to staff  - Offer Toileting every  Hours,  in advance of need  - Initiate/Maintain alarm  - Obtain necessary fall risk management equipment:   - Apply yellow socks and bracelet for high fall risk patients  - Consider moving patient to room near nurses station  Outcome: Progressing  Goal: Maintain or return to baseline ADL function  Description: INTERVENTIONS:  -  Assess patient's ability to carry out ADLs; assess patient's baseline for ADL function and identify physical deficits which impact ability to perform ADLs (bathing, care of mouth/teeth, toileting, grooming, dressing, etc.)  - Assess/evaluate cause of self-care deficits   - Assess range of motion  - Assess patient's mobility; develop plan if impaired  - Assess patient's need for assistive devices and provide as appropriate  - Encourage maximum independence but intervene and supervise when necessary  - Involve family in performance of ADLs  - Assess for home care needs following discharge   - Consider OT consult to assist with ADL evaluation and planning for discharge  - Provide patient education as appropriate  Outcome: Progressing  Goal: Maintains/Returns to pre admission functional level  Description: INTERVENTIONS:  - Perform AM-PAC 6 Click Basic Mobility/ Daily Activity assessment daily.  - Set and communicate daily mobility goal to care team and patient/family/caregiver.   - Collaborate with rehabilitation services on mobility goals if consulted  - Perform Range of Motion  times a day.  - Reposition patient every  hours.  - Dangle patient  times a day  - Stand patient  times a day  - Ambulate patient  times a day  - Out of bed to chair  times a day   - Out of bed for meals  times a day  - Out of bed for toileting  - Record patient progress and toleration of activity level   Outcome: Progressing     Problem: DISCHARGE PLANNING  Goal: Discharge to home or other facility with appropriate resources  Description: INTERVENTIONS:  - Identify barriers to discharge w/patient and caregiver  - Arrange for  needed discharge resources and transportation as appropriate  - Identify discharge learning needs (meds, wound care, etc.)  - Arrange for interpretive services to assist at discharge as needed  - Refer to Case Management Department for coordinating discharge planning if the patient needs post-hospital services based on physician/advanced practitioner order or complex needs related to functional status, cognitive ability, or social support system  Outcome: Progressing     Problem: Knowledge Deficit  Goal: Patient/family/caregiver demonstrates understanding of disease process, treatment plan, medications, and discharge instructions  Description: Complete learning assessment and assess knowledge base.  Interventions:  - Provide teaching at level of understanding  - Provide teaching via preferred learning methods  Outcome: Progressing

## 2025-03-10 NOTE — INTERVAL H&P NOTE
H&P reviewed. After examining the patient I find no changes in the patients condition since the H&P had been written.    Vitals:    03/10/25 0746   BP: 112/64   Pulse: 84   Resp: 18   Temp: 98.2 °F (36.8 °C)   SpO2: 97%

## 2025-03-10 NOTE — OP NOTE
OPERATIVE REPORT  PATIENT NAME: Domitila Landers    :  1995  MRN: [id number]  Pt Location: [facility] OR ROOM 04    SURGERY DATE: 3/10/2025    Surgeons and Role:     * Angie Dickens MD - Primary     * Nichole Lepe PA-C - Assisting    Preop Diagnosis:  Morbid obesity (HCC) [E66.01]  Mixed hyperlipidemia [E78.2]  Obstructive sleep apnea [G47.33]    Post-Op Diagnosis Codes:     * Morbid obesity (HCC) [E66.01]     * Mixed hyperlipidemia [E78.2]     * Obstructive sleep apnea [G47.33]    Procedure(s):  GASTRECTOMY SLEEVE LAPAROSCOPIC AND INTRAOPERATIVE EGD    Specimen(s):  ID Type Source Tests Collected by Time Destination   1 : Portion of stomach for h pylori Tissue Stomach TISSUE EXAM Angie Dickens MD 3/10/2025 1024        Estimated Blood Loss:   20 ml    Drains:  * No LDAs found *    Anesthesia Type:   General    Operative Indications:  Morbid obesity (HCC) [E66.01]  Mixed hyperlipidemia [E78.2]  Obstructive sleep apnea [G47.33]  BMI 48.30 kg/m2    Patient Active Problem List   Diagnosis    Bipolar affective disorder, current episode depressed (HCC)    Sinus tachycardia    Mixed hyperlipidemia    Ganglion cyst of dorsum of left wrist    SUSANNA (generalized anxiety disorder)    PTSD (post-traumatic stress disorder)    Mixed obsessional thoughts and acts    Subclinical hypothyroidism    Elevated alkaline phosphatase level    Apneic episode    Thrombocytosis    Elevated serum GGT level    BARRERA (obstructive sleep apnea)    Preop cardiovascular exam    Class 3 severe obesity due to excess calories with serious comorbidity and body mass index (BMI) of 50.0 to 59.9 in adult (HCC)    Class 3 obesity         Operative Findings:  Hepatomegaly      Complications:   None    Procedure and Technique:  The patient was identified by name, armband and conversation. The patient was then brought to the operative theatre. After successful induction of general anesthesia, the patient was prepped and draped in the usual sterile  fashion. A timeout was performed and all were in agreement.  Veress needle was used to pre-insufflate the abdomen at Gonzalez's point. Optiview technique was used to gain entrance into the abdomen with a 5 mm 0 degree laparoscope in the left upper quadrant. The abdomen was then insufflated to 15 mmHg. A 5 mm port was placed near the umbilicus. A 12 mm port was placed in the right mid abdomen. A 5 mm epigastric liver retractor was placed. A 5 mm left lower quadrant port was placed. Four quadrant Exparel/Marcaine transversus abdominus plane block was performed.  Gastoesophageal fat pad was resected. Starting 5 cm from the pylorus, the vessels along the greater curvature of the stomach were ligated and divided with the ultrasonic marvel all the way to the gastroesophageal junction such that the left mindy was exposed. A 36 Chinese ViSiGi tube was then passed along the lesser curvature of the stomach toward the pylorus and was placed on suction.  Starting 5 cm from the pylorus, a series of firings with the Ethicon stapler using a gold load and the remainder blue with SLR was used to create the sleeve gastrectomy until a point 1 cm from the gastroesophageal junction was reached.   EGD was then performed. The endoscope was advanced past the posterior pharynx into the first portion of the duodenum. No twist, kinks or angulation of the incisura. The incisura was wide open. No staple line bleeding. The first portion of the duodenum appear normal. No apical outpouching.  The specimen was retrieved from the 12 mm port. The right mid abdomen port was then closed with transfascial 0 vicryl suture. All port sites were examined as we exited the abdomen to ensure hemostasis. The skin was then closed with monocryl and Exofin. All instrument, sponge and needle counts were correct.      I was present for the entire procedure., A qualified resident physician was not available., and A physician assistant was required during the procedure  for retraction, tissue handling, dissection and suturing, traction/countertraction, stapling, camera driving, and performance of the intraoperative EGD.    Patient Disposition:  PACU              SIGNATURE: Angie Dickens MD  DATE: March 10, 2025  TIME: 11:07 AM

## 2025-03-11 VITALS
HEIGHT: 64 IN | DIASTOLIC BLOOD PRESSURE: 65 MMHG | RESPIRATION RATE: 17 BRPM | WEIGHT: 277.34 LBS | OXYGEN SATURATION: 94 % | HEART RATE: 79 BPM | BODY MASS INDEX: 47.35 KG/M2 | TEMPERATURE: 97.8 F | SYSTOLIC BLOOD PRESSURE: 125 MMHG

## 2025-03-11 LAB
ANION GAP SERPL CALCULATED.3IONS-SCNC: 8 MMOL/L (ref 4–13)
BUN SERPL-MCNC: 7 MG/DL (ref 5–25)
CALCIUM SERPL-MCNC: 8.7 MG/DL (ref 8.4–10.2)
CHLORIDE SERPL-SCNC: 104 MMOL/L (ref 96–108)
CO2 SERPL-SCNC: 25 MMOL/L (ref 21–32)
CREAT SERPL-MCNC: 0.51 MG/DL (ref 0.6–1.3)
ERYTHROCYTE [DISTWIDTH] IN BLOOD BY AUTOMATED COUNT: 14.6 % (ref 11.6–15.1)
GFR SERPL CREATININE-BSD FRML MDRD: 130 ML/MIN/1.73SQ M
GLUCOSE SERPL-MCNC: 134 MG/DL (ref 65–140)
HCT VFR BLD AUTO: 36.7 % (ref 34.8–46.1)
HGB BLD-MCNC: 11.7 G/DL (ref 11.5–15.4)
MCH RBC QN AUTO: 25.7 PG (ref 26.8–34.3)
MCHC RBC AUTO-ENTMCNC: 31.9 G/DL (ref 31.4–37.4)
MCV RBC AUTO: 81 FL (ref 82–98)
PLATELET # BLD AUTO: 428 THOUSANDS/UL (ref 149–390)
PMV BLD AUTO: 9.6 FL (ref 8.9–12.7)
POTASSIUM SERPL-SCNC: 3.9 MMOL/L (ref 3.5–5.3)
RBC # BLD AUTO: 4.55 MILLION/UL (ref 3.81–5.12)
SODIUM SERPL-SCNC: 137 MMOL/L (ref 135–147)
WBC # BLD AUTO: 16.81 THOUSAND/UL (ref 4.31–10.16)

## 2025-03-11 PROCEDURE — 80048 BASIC METABOLIC PNL TOTAL CA: CPT | Performed by: PHYSICIAN ASSISTANT

## 2025-03-11 PROCEDURE — 85027 COMPLETE CBC AUTOMATED: CPT | Performed by: PHYSICIAN ASSISTANT

## 2025-03-11 PROCEDURE — 99024 POSTOP FOLLOW-UP VISIT: CPT | Performed by: PHYSICIAN ASSISTANT

## 2025-03-11 PROCEDURE — NC001 PR NO CHARGE: Performed by: PHYSICIAN ASSISTANT

## 2025-03-11 RX ORDER — ACETAMINOPHEN 325 MG/1
1000 TABLET ORAL EVERY 8 HOURS SCHEDULED
Start: 2025-03-11 | End: 2025-03-18

## 2025-03-11 RX ORDER — BACLOFEN 10 MG/1
10 TABLET ORAL 3 TIMES DAILY
Qty: 21 TABLET | Refills: 0 | Status: SHIPPED | OUTPATIENT
Start: 2025-03-11 | End: 2025-03-18

## 2025-03-11 RX ADMIN — SIMETHICONE 80 MG: 80 TABLET, CHEWABLE ORAL at 04:38

## 2025-03-11 RX ADMIN — ENOXAPARIN SODIUM 40 MG: 40 INJECTION SUBCUTANEOUS at 08:01

## 2025-03-11 RX ADMIN — ACETAMINOPHEN 1000 MG: 1000 INJECTION, SOLUTION INTRAVENOUS at 00:15

## 2025-03-11 RX ADMIN — KETOROLAC TROMETHAMINE 15 MG: 30 INJECTION, SOLUTION INTRAMUSCULAR at 06:02

## 2025-03-11 RX ADMIN — TOPIRAMATE 50 MG: 25 TABLET, FILM COATED ORAL at 08:00

## 2025-03-11 RX ADMIN — FAMOTIDINE 20 MG: 10 INJECTION, SOLUTION INTRAVENOUS at 08:01

## 2025-03-11 RX ADMIN — ACETAMINOPHEN 1000 MG: 1000 INJECTION, SOLUTION INTRAVENOUS at 07:58

## 2025-03-11 RX ADMIN — KETOROLAC TROMETHAMINE 15 MG: 30 INJECTION, SOLUTION INTRAMUSCULAR at 00:15

## 2025-03-11 RX ADMIN — BACLOFEN 10 MG: 10 TABLET ORAL at 08:01

## 2025-03-11 RX ADMIN — ESCITALOPRAM OXALATE 10 MG: 10 TABLET ORAL at 08:01

## 2025-03-11 RX ADMIN — SODIUM CHLORIDE, SODIUM LACTATE, POTASSIUM CHLORIDE, AND CALCIUM CHLORIDE 100 ML/HR: .6; .31; .03; .02 INJECTION, SOLUTION INTRAVENOUS at 02:09

## 2025-03-11 NOTE — ASSESSMENT & PLAN NOTE
-Avoid fried foods and trans fat, limit saturated fats and refined carbohydrates  -Increase fish/omega 3 FA consumption  -Increase physical activity  -should improve s/p surgery - will monitor outpatient

## 2025-03-11 NOTE — ASSESSMENT & PLAN NOTE
Assessment/Plan  28 yo F s/p Lap sleeve gastrectomy POD1 with stable post op course. Encourage PO fluids, ambulation, and incentive spirometry.  Will plan for D/C home today    DVT Risk Calculator Score: 0.2466% - No DVT prophylaxis necessary    Plan of care was discussed with patient and patient's nurse  Care plan discussed with Dr. Dickens    Dispo: Continue bariatric clear liquid diet, ambulation, incentive spirometry.

## 2025-03-11 NOTE — DISCHARGE SUMMARY
"Discharge Summary - Bariatrics     Discharge Summary - Domitila Landers 29 y.o. female MRN: [id number]    Unit/Bed#: -01 Encounter: [id number]      Pre-Operative Diagnosis: Pre-Op Diagnosis Codes:      * Morbid obesity (HCC) [E66.01]     * Mixed hyperlipidemia [E78.2]     * Obstructive sleep apnea [G47.33]    Post-Operative Diagnosis: Post-Op Diagnosis Codes:     * Morbid obesity (HCC) [E66.01]     * Mixed hyperlipidemia [E78.2]     * Obstructive sleep apnea [G47.33]    Procedures Performed:  Procedure(s):  GASTRECTOMY SLEEVE LAPAROSCOPIC AND INTRAOPERATIVE EGD    Surgeon: Angie Dickens MD    See H & P for full details of admission and Operative Note for full details of operations performed.     Hospital Course:  Patient was admitted for a Laparoscopic Sleeve Gastrectomy. Post operatively pain was controlled with oral analgesics and the patient is ambulating/micturating without difficulty. Vital signs and lab work were stable. The patient is tolerating clear liquid diet without nausea or vomiting. The patient is cleared for D/C by the surgeon on POD1.    Patient was seen and examined prior to discharge.      Provisions for Follow-Up Care:  See After Visit Summary for information related to follow-up care and home orders.      Disposition: Home, in stable condition. Patient should refer to \"Discharge Instructions\" for further information.    Planned Readmission: No    Discharge Medications:  See After Visit Summary for reconciled discharge medications provided to patient and family.      Post Operative instructions: Reviewed with patient and/or family.    This text is generated with voice recognition software. There may be translation, syntax,  or grammatical errors. If you have any questions, please contact the dictating provider.     Signature:   Nichole Lepe PA-C  Date: 3/11/2025 Time: 9:05 AM     "

## 2025-03-11 NOTE — PROGRESS NOTES
Progress Note - Bariatrics   Name: Domitila Landers 29 y.o. female I MRN: [id number]  Unit/Bed#: -01 I Date of Admission: 3/10/2025   Date of Service: 3/11/2025 I Hospital Day: 0     Assessment & Plan  Class 3 obesity  Assessment/Plan  28 yo F s/p Lap sleeve gastrectomy POD1 with stable post op course. Encourage PO fluids, ambulation, and incentive spirometry.  Will plan for D/C home today    DVT Risk Calculator Score: 0.2466% - No DVT prophylaxis necessary    Plan of care was discussed with patient and patient's nurse  Care plan discussed with Dr. Dickens    Dispo: Continue bariatric clear liquid diet, ambulation, incentive spirometry.     Mixed hyperlipidemia  -Avoid fried foods and trans fat, limit saturated fats and refined carbohydrates  -Increase fish/omega 3 FA consumption  -Increase physical activity  -should improve s/p surgery - will monitor outpatient  BARRERA (obstructive sleep apnea)  Mild - no CPAP; should improve s/p surgery     Subjective   Mild gas pain - improving - passing flatus and belching some. Tolerating liquid diet without nausea or vomiting, pain adequately controlled on oral pain medication, ambulating without assistance, voiding well, using incentive spirometer. Denies fevers, chills, sweats, SOB, CP, calf pain.      Objective :  Temp:  [97.3 °F (36.3 °C)-97.8 °F (36.6 °C)] 97.7 °F (36.5 °C)  HR:  [65-94] 77  BP: ()/(54-99) 125/65  Resp:  [12-21] 16  SpO2:  [94 %-100 %] 94 %  O2 Device: None (Room air)    Physical Exam  Vitals reviewed.   Constitutional:       General: She is not in acute distress.     Appearance: She is well-developed.   HENT:      Head: Normocephalic and atraumatic.   Eyes:      General: No scleral icterus.  Cardiovascular:      Rate and Rhythm: Normal rate and regular rhythm.   Pulmonary:      Effort: Pulmonary effort is normal. No respiratory distress.   Abdominal:      General: There is no distension.      Palpations: Abdomen is soft.      Tenderness: There  is no abdominal tenderness.      Comments: Incisions C/D/I   Skin:     General: Skin is warm and dry.   Neurological:      Mental Status: She is alert.   Psychiatric:         Mood and Affect: Mood normal.         Behavior: Behavior normal.       Lab Results: I have reviewed the following results:  Lab Results   Component Value Date    WBC 16.81 (H) 03/11/2025    HGB 11.7 03/11/2025    HCT 36.7 03/11/2025    MCV 81 (L) 03/11/2025     (H) 03/11/2025     Lab Results   Component Value Date    SODIUM 137 03/11/2025    K 3.9 03/11/2025     03/11/2025    CO2 25 03/11/2025    AGAP 8 03/11/2025    BUN 7 03/11/2025    CREATININE 0.51 (L) 03/11/2025    GLUC 134 03/11/2025    GLUF 72 10/29/2024    CALCIUM 8.7 03/11/2025    AST 19 10/29/2024    ALT 25 10/29/2024    ALKPHOS 108 (H) 10/29/2024    TP 7.2 10/29/2024    TBILI 0.40 10/29/2024    EGFR 130 03/11/2025     Lab Results   Component Value Date    HGBA1C 5.7 (H) 10/29/2024     Lab Results   Component Value Date    SIZ7RNFGBKUW 4.073 01/27/2025    TSH 3.14 01/11/2019     Lab Results   Component Value Date    CHOLESTEROL 153 01/27/2025     Lab Results   Component Value Date    HDL 32 (L) 01/27/2025     Lab Results   Component Value Date    TRIG 313 (H) 01/27/2025     Lab Results   Component Value Date    LDLCALC 58 01/27/2025       Imaging Results Review: No pertinent imaging studies reviewed.  Other Study Results Review: No additional pertinent studies reviewed.    VTE Pharmacologic Prophylaxis: Enoxaparin (Lovenox)  VTE Mechanical Prophylaxis: sequential compression device

## 2025-03-11 NOTE — PLAN OF CARE
Problem: PAIN - ADULT  Goal: Verbalizes/displays adequate comfort level or baseline comfort level  Description: Interventions:  - Encourage patient to monitor pain and request assistance  - Assess pain using appropriate pain scale  - Administer analgesics based on type and severity of pain and evaluate response  - Implement non-pharmacological measures as appropriate and evaluate response  - Consider cultural and social influences on pain and pain management  - Notify physician/advanced practitioner if interventions unsuccessful or patient reports new pain  Outcome: Progressing     Problem: INFECTION - ADULT  Goal: Absence or prevention of progression during hospitalization  Description: INTERVENTIONS:  - Assess and monitor for signs and symptoms of infection  - Monitor lab/diagnostic results  - Monitor all insertion sites, i.e. indwelling lines, tubes, and drains  - Monitor endotracheal if appropriate and nasal secretions for changes in amount and color  - Clarence appropriate cooling/warming therapies per order  - Administer medications as ordered  - Instruct and encourage patient and family to use good hand hygiene technique  - Identify and instruct in appropriate isolation precautions for identified infection/condition  Outcome: Progressing  Goal: Absence of fever/infection during neutropenic period  Description: INTERVENTIONS:  - Monitor WBC    Outcome: Progressing     Problem: SAFETY ADULT  Goal: Patient will remain free of falls  Description: INTERVENTIONS:  - Educate patient/family on patient safety including physical limitations  - Instruct patient to call for assistance with activity   - Consult OT/PT to assist with strengthening/mobility   - Keep Call bell within reach  - Keep bed low and locked with side rails adjusted as appropriate  - Keep care items and personal belongings within reach  - Initiate and maintain comfort rounds  - Make Fall Risk Sign visible to staff  - Offer Toileting every 2 Hours,  in advance of need  - Initiate/Maintain alarm  - Obtain necessary fall risk management equipment  - Apply yellow socks and bracelet for high fall risk patients  - Consider moving patient to room near nurses station  Outcome: Progressing  Goal: Maintain or return to baseline ADL function  Description: INTERVENTIONS:  -  Assess patient's ability to carry out ADLs; assess patient's baseline for ADL function and identify physical deficits which impact ability to perform ADLs (bathing, care of mouth/teeth, toileting, grooming, dressing, etc.)  - Assess/evaluate cause of self-care deficits   - Assess range of motion  - Assess patient's mobility; develop plan if impaired  - Assess patient's need for assistive devices and provide as appropriate  - Encourage maximum independence but intervene and supervise when necessary  - Involve family in performance of ADLs  - Assess for home care needs following discharge   - Consider OT consult to assist with ADL evaluation and planning for discharge  - Provide patient education as appropriate  Outcome: Progressing  Goal: Maintains/Returns to pre admission functional level  Description: INTERVENTIONS:  - Perform AM-PAC 6 Click Basic Mobility/ Daily Activity assessment daily.  - Set and communicate daily mobility goal to care team and patient/family/caregiver.   - Collaborate with rehabilitation services on mobility goals if consulted  - Perform Range of Motion 3 times a day.  - Reposition patient every 3 hours.  - Dangle patient 3 times a day  - Stand patient 3 times a day  - Ambulate patient 3 times a day  - Out of bed to chair 3 times a day   - Out of bed for meals 3 times a day  - Out of bed for toileting  - Record patient progress and toleration of activity level   Outcome: Progressing     Problem: DISCHARGE PLANNING  Goal: Discharge to home or other facility with appropriate resources  Description: INTERVENTIONS:  - Identify barriers to discharge w/patient and caregiver  - Arrange  for needed discharge resources and transportation as appropriate  - Identify discharge learning needs (meds, wound care, etc.)  - Arrange for interpretive services to assist at discharge as needed  - Refer to Case Management Department for coordinating discharge planning if the patient needs post-hospital services based on physician/advanced practitioner order or complex needs related to functional status, cognitive ability, or social support system  Outcome: Progressing     Problem: Knowledge Deficit  Goal: Patient/family/caregiver demonstrates understanding of disease process, treatment plan, medications, and discharge instructions  Description: Complete learning assessment and assess knowledge base.  Interventions:  - Provide teaching at level of understanding  - Provide teaching via preferred learning methods  Outcome: Progressing

## 2025-03-12 ENCOUNTER — TELEPHONE (OUTPATIENT)
Dept: BARIATRICS | Facility: CLINIC | Age: 30
End: 2025-03-12

## 2025-03-12 PROCEDURE — 88307 TISSUE EXAM BY PATHOLOGIST: CPT | Performed by: STUDENT IN AN ORGANIZED HEALTH CARE EDUCATION/TRAINING PROGRAM

## 2025-03-12 NOTE — TELEPHONE ENCOUNTER
Post op follow up phone call completed.  Pt is sipping liquids but does need to increase, using IS as instructed, reinforced importance of using IS to help prevent pneumonia. Ambulating about home without difficulty.  Minimal pain, not using oxycodone.  Reaffirmed examples of liquid diet over the next week.  Pt stated understanding about discharge instructions and medication adjustments.  Follow up appt with surgeon scheduled for next week.   Instructed to call with any additional questions or concerns.

## 2025-03-13 ENCOUNTER — RESULTS FOLLOW-UP (OUTPATIENT)
Dept: BARIATRICS | Facility: CLINIC | Age: 30
End: 2025-03-13

## 2025-03-14 ENCOUNTER — OFFICE VISIT (OUTPATIENT)
Dept: FAMILY MEDICINE CLINIC | Facility: CLINIC | Age: 30
End: 2025-03-14
Payer: COMMERCIAL

## 2025-03-14 ENCOUNTER — TRANSITIONAL CARE MANAGEMENT (OUTPATIENT)
Dept: FAMILY MEDICINE CLINIC | Facility: CLINIC | Age: 30
End: 2025-03-14

## 2025-03-14 VITALS
HEIGHT: 63 IN | TEMPERATURE: 98 F | HEART RATE: 76 BPM | RESPIRATION RATE: 16 BRPM | BODY MASS INDEX: 48.2 KG/M2 | WEIGHT: 272 LBS | SYSTOLIC BLOOD PRESSURE: 102 MMHG | OXYGEN SATURATION: 99 % | DIASTOLIC BLOOD PRESSURE: 62 MMHG

## 2025-03-14 DIAGNOSIS — E66.813 CLASS 3 SEVERE OBESITY DUE TO EXCESS CALORIES WITH SERIOUS COMORBIDITY AND BODY MASS INDEX (BMI) OF 50.0 TO 59.9 IN ADULT (HCC): Primary | ICD-10-CM

## 2025-03-14 DIAGNOSIS — E66.01 CLASS 3 SEVERE OBESITY DUE TO EXCESS CALORIES WITH SERIOUS COMORBIDITY AND BODY MASS INDEX (BMI) OF 50.0 TO 59.9 IN ADULT (HCC): Primary | ICD-10-CM

## 2025-03-14 DIAGNOSIS — G47.33 OSA (OBSTRUCTIVE SLEEP APNEA): ICD-10-CM

## 2025-03-14 PROCEDURE — 99496 TRANSJ CARE MGMT HIGH F2F 7D: CPT | Performed by: FAMILY MEDICINE

## 2025-03-14 NOTE — PROGRESS NOTES
Transition of Care Visit  Name: Domitila Landers      : 1995      MRN: [id number]  Encounter Provider: Jose Wallace MD  Encounter Date: 3/14/2025   Encounter department: [facility]    Assessment & Plan  Class 3 severe obesity due to excess calories with serious comorbidity and body mass index (BMI) of 50.0 to 59.9 in adult (HCC)    TCM visit completed today.  Status post gastrectomy sleeve laparoscopic with Dr. Dickens on 3/10/2025.  Follow-up scheduled with bariatrics   Continue clear liquid diet  Continue incentive spirometry.         BARRERA (obstructive sleep apnea)                History of Present Illness     Transitional Care Management Review:   Domitila Landers is a 29 y.o. female here for TCM follow up.     During the TCM phone call patient stated:  TCM Call (since 2025)       Date and time call was made  3/11/2025  8:39 AM    Hospital care reviewed  Records reviewed    Patient was hospitialized at  [facility]    Date of Admission  03/10/25    Date of discharge  25    Diagnosis  Class 3 obesity    Disposition  Home    Were the patients medications reviewed and updated  Yes    Current Symptoms  None          TCM Call (since 2025)       Post hospital issues  None    Scheduled for follow up?  Yes    Did you obtain your prescribed medications  Yes    Do you need help managing your prescriptions or medications  No    Is transportation to your appointment needed  No    I have advised the patient to call PCP with any new or worsening symptoms  alana johnson MA    Living Arrangements  Spouse or Significiant other    Support System  Spouse    The type of support provided  Emotional; Physical    Do you have social support  Yes, as much as I need    Are you recieving home care services  No          Patient presents today for TCM following gastric sleeve procedure        Review of Systems   Constitutional:  Negative for activity change, fatigue  "and fever.   Eyes:  Negative for visual disturbance.   Respiratory:  Negative for shortness of breath.    Cardiovascular:  Negative for chest pain.   Gastrointestinal:  Negative for abdominal pain, constipation, diarrhea and nausea.   Endocrine: Negative for cold intolerance and heat intolerance.   Musculoskeletal:  Negative for back pain.   Skin:  Negative for rash.   Neurological:  Negative for headaches.   Psychiatric/Behavioral:  Negative for confusion.      Objective   /62 (BP Location: Left arm, Patient Position: Sitting, Cuff Size: Large)   Pulse 76   Temp 98 °F (36.7 °C) (Temporal)   Resp 16   Ht 5' 3.4\" (1.61 m)   Wt 123 kg (272 lb)   SpO2 99%   BMI 47.58 kg/m²     Physical Exam  Vitals and nursing note reviewed.   Constitutional:       Appearance: She is well-developed. She is obese.   HENT:      Head: Normocephalic and atraumatic.   Cardiovascular:      Rate and Rhythm: Normal rate and regular rhythm.   Pulmonary:      Effort: Pulmonary effort is normal.      Breath sounds: Normal breath sounds.   Abdominal:      General: Bowel sounds are normal.      Palpations: Abdomen is soft.   Musculoskeletal:      Cervical back: Normal range of motion.   Skin:     General: Skin is warm.      Comments: Incisions clean dry and intact   Neurological:      General: No focal deficit present.      Mental Status: She is alert.   Psychiatric:         Mood and Affect: Mood normal.         Speech: Speech normal.       Medications have been reviewed by provider in current encounter      "

## 2025-03-14 NOTE — ASSESSMENT & PLAN NOTE
TCM visit completed today.  Status post gastrectomy sleeve laparoscopic with Dr. Dickens on 3/10/2025.  Follow-up scheduled with bariatrics March 20th  Continue clear liquid diet  Continue incentive spirometry.

## 2025-03-19 NOTE — PROGRESS NOTES
Weight Management Nutrition Education    Diagnosis: Obesity    Bariatric Surgeon: Dr. Dickens    Surgery: Vertical Sleeve Gastrectomy    Class: first post op note    Topics discussed today include:     fluid goals post op, protein goals post op, constipation, chew food well, exercise, avoidance of alcohol, PPI use, diet progression, hypoglycemia, dumping syndrome, protein supplems, vitamin/mineral supplements, and calcium supplements    Patient was able to verbalize basic diet (protein, fluid, vitamin and mineral) recommendations and possible nutrition-related complications. Yes     Protein and Fluids adequate  Protein Drinks: 1-2 Premier with [place] Milk   Fluids:32 oz Water + Shakes, Decaf tea 4 oz , ~ 55 oz   Started puree diet and tolerating: Cottage Cheese, Applesauce, blended chicken with guacamole  Measuring 1/4 c:Yes  Following 30/60 rule: Yes  Eating slow and sipping fluids: Yes  Started chewable multivitamin and calcium: Yes  To start soft diet next week

## 2025-03-20 ENCOUNTER — OFFICE VISIT (OUTPATIENT)
Dept: BARIATRICS | Facility: CLINIC | Age: 30
End: 2025-03-20

## 2025-03-20 ENCOUNTER — CLINICAL SUPPORT (OUTPATIENT)
Dept: BARIATRICS | Facility: CLINIC | Age: 30
End: 2025-03-20

## 2025-03-20 VITALS
DIASTOLIC BLOOD PRESSURE: 62 MMHG | RESPIRATION RATE: 12 BRPM | WEIGHT: 267 LBS | BODY MASS INDEX: 47.31 KG/M2 | OXYGEN SATURATION: 96 % | HEART RATE: 82 BPM | HEIGHT: 63 IN | SYSTOLIC BLOOD PRESSURE: 114 MMHG

## 2025-03-20 DIAGNOSIS — K91.2 POSTSURGICAL MALABSORPTION: ICD-10-CM

## 2025-03-20 DIAGNOSIS — E66.01 OBESITY, CLASS III, BMI 40-49.9 (MORBID OBESITY) (HCC): Primary | ICD-10-CM

## 2025-03-20 DIAGNOSIS — E03.8 SUBCLINICAL HYPOTHYROIDISM: ICD-10-CM

## 2025-03-20 DIAGNOSIS — K91.2 POSTSURGICAL MALABSORPTION: Primary | ICD-10-CM

## 2025-03-20 PROBLEM — E66.813 CLASS 3 SEVERE OBESITY DUE TO EXCESS CALORIES WITH SERIOUS COMORBIDITY AND BODY MASS INDEX (BMI) OF 50.0 TO 59.9 IN ADULT (HCC): Status: RESOLVED | Noted: 2021-04-29 | Resolved: 2025-03-20

## 2025-03-20 PROBLEM — E66.813 OBESITY, CLASS III, BMI 40-49.9 (MORBID OBESITY): Status: ACTIVE | Noted: 2025-03-20

## 2025-03-20 PROCEDURE — 99024 POSTOP FOLLOW-UP VISIT: CPT | Performed by: SURGERY

## 2025-03-20 PROCEDURE — RECHECK

## 2025-03-20 NOTE — LETTER
March 20, 2025     Jose Wallace MD  [address] [facility] [address]    Patient: Domitila Landers   YOB: 1995   Date of Visit: 3/20/2025       Dear Dr. Wallace:    Thank you for referring Domitila Landers to me for metabolic and bariatric surgery. Below are my notes for this post-operative visit.    If you have questions, please do not hesitate to call me. I look forward to following your patient along with you.         Sincerely,        Angie Dickens MD        CC: No Recipients    Angie Dickens MD  3/20/2025  8:27 AM  Sign when Signing Visit  FIRST POST-OPERATIVE VISIT - BARIATRIC SURGERY  Domitila Landers 29 y.o. female MRN: [id number]  Unit/Bed#:  Encounter: [id number]      HPI:  Domitila Landers is a 29 y.o. female who presents for follow up s/p laparoscopic sleeve gastrectomy.  She is doing well.  She is tolerating a diet.  She denies pain.    Amount of Oxycodone 5 mg tablets taken - (0 tablets; pain score-0)        Historical Information  Past Medical History:   Diagnosis Date   • Anxiety 2016   • Bipolar disorder (HCC)    • Depression    • Fatty liver    • Hyperlipidemia 01/13/2020   • Kidney stone 2014   • Leukocytosis 05/01/2020   • Obesity    • Self-injurious behavior    • Sleep apnea    • Suicide attempt (HCC)     2016     Past Surgical History:   Procedure Laterality Date   • EGD     • NO PAST SURGERIES     • NE LAPS GSTRC RSTRICTIV PX LONGITUDINAL GASTRECTOMY N/A 3/10/2025    Procedure: GASTRECTOMY SLEEVE LAPAROSCOPIC AND INTRAOPERATIVE EGD;  Surgeon: Angie Dickens MD;  Location: [facility] OR;  Service: Bariatrics     Social History  Social History     Substance and Sexual Activity   Alcohol Use Not Currently    Comment: 1-2 mixed drinks once monthly or less     Social History     Substance and Sexual Activity   Drug Use No     Social History     Tobacco Use   Smoking Status Never   Smokeless Tobacco Former   • Types: Snuff, Chew     Family History: Family history  "non-contributory    Meds/Allergies  all medications and allergies reviewed  No Known Allergies    Objective    Current Vitals:   /62 (BP Location: Left arm, Patient Position: Sitting, Cuff Size: Large)   Pulse 82   Resp 12   Ht 5' 3\" (1.6 m)   Wt 121 kg (267 lb)   SpO2 96%   BMI 47.30 kg/m²     Invasive Devices       Peripheral Intravenous Line  Duration             Peripheral IV 03/10/25 Left Hand 10 days    Peripheral IV 03/10/25 Right Hand 10 days                    Physical Exam  Constitutional:       Appearance: Normal appearance.   Cardiovascular:      Rate and Rhythm: Normal rate.   Pulmonary:      Effort: Pulmonary effort is normal.      Breath sounds: Normal breath sounds.   Abdominal:      General: Abdomen is flat. There is no distension.      Palpations: Abdomen is soft.      Tenderness: There is no abdominal tenderness.      Comments: Wounds clean/dry/intact without surrounding erythema   Neurological:      Mental Status: She is alert.         Assessment/Plan:    Obesity, Class III, BMI 40-49.9 (morbid obesity) (HCC)  Patient is presenting for the first postoperative visit, patient hospital stay was uneventful without any complications, patient is doing well, has no complaints, is taking vitamins as instructed, currently tolerating the blenderized diet, will advance to soft diet. The patient is also tolerating Lovenox injections and will be completing the remaining doses.    Patient will also be meeting with our dietician today to review vitamin and mineral supplements and also go over diet and emphasize postoperative commitment and compliance. The patient was also instructed to start exercising on a regular basis. However, I recommended no heavy lifting, or weight exercises for another 2 weeks. F/U in 4 weeks. Patient was instructed to call if develops nausea, vomiting, fever or chills. Pathology was also reviewed and was negative for H. pylori/metaplasia.     "

## 2025-03-20 NOTE — ASSESSMENT & PLAN NOTE
Patient is presenting for the first postoperative visit, patient hospital stay was uneventful without any complications, patient is doing well, has no complaints, is taking vitamins as instructed, currently tolerating the blenderized diet, will advance to soft diet. The patient is also tolerating Lovenox injections and will be completing the remaining doses.    Patient will also be meeting with our dietician today to review vitamin and mineral supplements and also go over diet and emphasize postoperative commitment and compliance. The patient was also instructed to start exercising on a regular basis. However, I recommended no heavy lifting, or weight exercises for another 2 weeks. F/U in 4 weeks. Patient was instructed to call if develops nausea, vomiting, fever or chills. Pathology was also reviewed and was negative for H. pylori/metaplasia.

## 2025-03-20 NOTE — PROGRESS NOTES
"FIRST POST-OPERATIVE VISIT - BARIATRIC SURGERY  Domitila Landers 29 y.o. female MRN: [id number]  Unit/Bed#:  Encounter: [id number]      HPI:  Domitila Landers is a 29 y.o. female who presents for follow up s/p laparoscopic sleeve gastrectomy.  She is doing well.  She is tolerating a diet.  She denies pain.    Amount of Oxycodone 5 mg tablets taken - (0 tablets; pain score-0)        Historical Information   Past Medical History:   Diagnosis Date    Anxiety 2016    Bipolar disorder (HCC)     Depression     Fatty liver     Hyperlipidemia 01/13/2020    Kidney stone 2014    Leukocytosis 05/01/2020    Obesity     Self-injurious behavior     Sleep apnea     Suicide attempt (HCC)     2016     Past Surgical History:   Procedure Laterality Date    EGD      NO PAST SURGERIES      HI LAPS GSTRC RSTRICTIV PX LONGITUDINAL GASTRECTOMY N/A 3/10/2025    Procedure: GASTRECTOMY SLEEVE LAPAROSCOPIC AND INTRAOPERATIVE EGD;  Surgeon: Angie Dickens MD;  Location: [facility];  Service: Bariatrics     Social History   Social History     Substance and Sexual Activity   Alcohol Use Not Currently    Comment: 1-2 mixed drinks once monthly or less     Social History     Substance and Sexual Activity   Drug Use No     Social History     Tobacco Use   Smoking Status Never   Smokeless Tobacco Former    Types: Snuff, Chew     Family History: Family history non-contributory    Meds/Allergies   all medications and allergies reviewed  No Known Allergies    Objective     Current Vitals:   /62 (BP Location: Left arm, Patient Position: Sitting, Cuff Size: Large)   Pulse 82   Resp 12   Ht 5' 3\" (1.6 m)   Wt 121 kg (267 lb)   SpO2 96%   BMI 47.30 kg/m²     Invasive Devices       Peripheral Intravenous Line  Duration             Peripheral IV 03/10/25 Left Hand 10 days    Peripheral IV 03/10/25 Right Hand 10 days                    Physical Exam  Constitutional:       Appearance: Normal appearance.   Cardiovascular:      Rate and Rhythm: " Normal rate.   Pulmonary:      Effort: Pulmonary effort is normal.      Breath sounds: Normal breath sounds.   Abdominal:      General: Abdomen is flat. There is no distension.      Palpations: Abdomen is soft.      Tenderness: There is no abdominal tenderness.      Comments: Wounds clean/dry/intact without surrounding erythema   Neurological:      Mental Status: She is alert.         Assessment/Plan:    Obesity, Class III, BMI 40-49.9 (morbid obesity) (HCC)  Patient is presenting for the first postoperative visit, patient hospital stay was uneventful without any complications, patient is doing well, has no complaints, is taking vitamins as instructed, currently tolerating the blenderized diet, will advance to soft diet. The patient is also tolerating Lovenox injections and will be completing the remaining doses.    Patient will also be meeting with our dietician today to review vitamin and mineral supplements and also go over diet and emphasize postoperative commitment and compliance. The patient was also instructed to start exercising on a regular basis. However, I recommended no heavy lifting, or weight exercises for another 2 weeks. F/U in 4 weeks. Patient was instructed to call if develops nausea, vomiting, fever or chills. Pathology was also reviewed and was negative for H. pylori/metaplasia.

## 2025-04-16 ENCOUNTER — CLINICAL SUPPORT (OUTPATIENT)
Dept: BARIATRICS | Facility: CLINIC | Age: 30
End: 2025-04-16

## 2025-04-16 DIAGNOSIS — K91.2 POSTSURGICAL MALABSORPTION: Primary | ICD-10-CM

## 2025-04-16 PROCEDURE — RECHECK

## 2025-05-30 ENCOUNTER — ANNUAL EXAM (OUTPATIENT)
Dept: OBGYN CLINIC | Facility: CLINIC | Age: 30
End: 2025-05-30
Payer: COMMERCIAL

## 2025-05-30 VITALS
DIASTOLIC BLOOD PRESSURE: 74 MMHG | SYSTOLIC BLOOD PRESSURE: 130 MMHG | WEIGHT: 238.4 LBS | HEIGHT: 63 IN | BODY MASS INDEX: 42.24 KG/M2

## 2025-05-30 DIAGNOSIS — Z01.419 ENCOUNTER FOR WELL WOMAN EXAM: Primary | ICD-10-CM

## 2025-05-30 DIAGNOSIS — L68.0 HIRSUTISM: ICD-10-CM

## 2025-05-30 DIAGNOSIS — Z12.39 ENCOUNTER FOR SCREENING BREAST EXAMINATION: ICD-10-CM

## 2025-05-30 PROCEDURE — S0612 ANNUAL GYNECOLOGICAL EXAMINA: HCPCS | Performed by: PHYSICIAN ASSISTANT

## 2025-05-30 NOTE — PROGRESS NOTES
":  Assessment & Plan  Encounter for well woman exam         Encounter for screening breast examination         Hirsutism    Orders:    Ambulatory Referral to Dermatology; Future        History of Present Illness     Domitila Landers is a 29 y.o. female   Pt presents for her annual exam today--  She has no complaints except increased hair growth on chin and chest  Pt has had for years, but seems worse since her weight loss surgery in 3/2025  Periods are regular  Overall, feeling well  She has no pelvic pain  Bowel and bladder are regular  Colonoscopy--  No breast concerns today  Last mammo--    No pap today.    Cont tracking cycles  Daily mvi  Refer to DERM for skin and hair concerns      Review of Systems   Constitutional:  Negative for chills, fever and unexpected weight change.   HENT:  Negative for ear pain and sore throat.    Eyes:  Negative for pain and visual disturbance.   Respiratory:  Negative for cough and shortness of breath.    Cardiovascular:  Negative for chest pain and palpitations.   Gastrointestinal:  Negative for abdominal pain, blood in stool, constipation, diarrhea and vomiting.   Genitourinary: Negative.  Negative for dysuria and hematuria.   Musculoskeletal:  Negative for arthralgias and back pain.   Skin:  Negative for color change and rash.   Neurological:  Negative for seizures and syncope.   All other systems reviewed and are negative.    Objective   /74 (BP Location: Left arm, Patient Position: Sitting, Cuff Size: Large)   Ht 5' 3\" (1.6 m)   Wt 108 kg (238 lb 6.4 oz)   LMP 05/30/2025 (Exact Date)   BMI 42.23 kg/m²      Physical Exam  Vitals and nursing note reviewed.   Constitutional:       General: She is not in acute distress.     Appearance: She is well-developed.   HENT:      Head: Normocephalic and atraumatic.     Eyes:      Conjunctiva/sclera: Conjunctivae normal.       Cardiovascular:      Rate and Rhythm: Normal rate and regular rhythm.      Heart sounds: No murmur " heard.  Pulmonary:      Effort: Pulmonary effort is normal. No respiratory distress.      Breath sounds: Normal breath sounds.   Abdominal:      Palpations: Abdomen is soft.      Tenderness: There is no abdominal tenderness.     Musculoskeletal:         General: No swelling.      Cervical back: Neck supple.     Skin:     General: Skin is warm and dry.      Capillary Refill: Capillary refill takes less than 2 seconds.     Neurological:      Mental Status: She is alert.     Psychiatric:         Mood and Affect: Mood normal.

## 2025-06-30 ENCOUNTER — OFFICE VISIT (OUTPATIENT)
Dept: BARIATRICS | Facility: CLINIC | Age: 30
End: 2025-06-30
Payer: COMMERCIAL

## 2025-06-30 VITALS
DIASTOLIC BLOOD PRESSURE: 68 MMHG | RESPIRATION RATE: 16 BRPM | WEIGHT: 226 LBS | SYSTOLIC BLOOD PRESSURE: 102 MMHG | HEART RATE: 80 BPM | BODY MASS INDEX: 38.58 KG/M2 | HEIGHT: 64 IN

## 2025-06-30 DIAGNOSIS — Z48.815 ENCOUNTER FOR SURGICAL AFTERCARE FOLLOWING SURGERY OF DIGESTIVE SYSTEM: Primary | ICD-10-CM

## 2025-06-30 DIAGNOSIS — E78.2 MIXED HYPERLIPIDEMIA: ICD-10-CM

## 2025-06-30 DIAGNOSIS — K91.2 POSTSURGICAL MALABSORPTION: ICD-10-CM

## 2025-06-30 PROCEDURE — 99214 OFFICE O/P EST MOD 30 MIN: CPT | Performed by: PHYSICIAN ASSISTANT

## 2025-06-30 RX ORDER — DIPHENOXYLATE HYDROCHLORIDE AND ATROPINE SULFATE 2.5; .025 MG/1; MG/1
1 TABLET ORAL DAILY
COMMUNITY

## 2025-06-30 RX ORDER — CALCIUM CARBONATE 500(1250)
1 TABLET,CHEWABLE ORAL DAILY
COMMUNITY

## 2025-06-30 RX ORDER — ESCITALOPRAM OXALATE 5 MG/1
1 TABLET ORAL DAILY
COMMUNITY
Start: 2025-06-11

## 2025-06-30 NOTE — ASSESSMENT & PLAN NOTE
-s/p Vertical Sleeve Gastrectomy with Dr. Mehta on 03/10/25. Overall doing Well. EWL on schedule for expected post surgical weight loss at this time. Continue healthy diet - increase protein and fluids and exercise - start strength training and f/u with RD and LCSW for additional support and me in 3 months.    Slowly taper off PPI - notify me if any issues.    Initial: 305.6lbs  Current: 226lbs  EWL: 48%  Alexis: current  Current BMI is Body mass index is 39.41 kg/m².    Tolerating a regular diet-yes  Eating at least 60 grams of protein per day-yes  Following 30/60 minute rule with liquids-yes  Drinking at least 64 ounces of fluid per day-yes  Drinking carbonated beverages-no  Sufficient exercise-yes  Using NSAIDs regularly-no  Using nicotine-no  Using alcohol-no. Advised about the risks of alcohol s/p bariatric surgery and recommend avoiding all alcohol

## 2025-06-30 NOTE — ASSESSMENT & PLAN NOTE
-At risk for malabsorption of vitamins/minerals secondary to malabsorption and restriction of intake from bariatric surgery  -Currently taking adequate postop bariatric surgery vitamin supplementation: Nader MVI with 45mg iron, calcium citrate 500mg TID    -Obtain vitamin/metabolic panel  -Patient received education about the importance of adhering to a lifelong supplementation regimen to avoid vitamin/mineral deficiencies

## 2025-06-30 NOTE — PROGRESS NOTES
Assessment/Plan:    Encounter for surgical aftercare following surgery of digestive system  -s/p Vertical Sleeve Gastrectomy with Dr. Mehta on 03/10/25. Overall doing Well. EWL on schedule for expected post surgical weight loss at this time. Continue healthy diet - increase protein and fluids and exercise - start strength training and f/u with RD and LCSW for additional support and me in 3 months.    Slowly taper off PPI - notify me if any issues.    Initial: 305.6lbs  Current: 226lbs  EWL: 48%  Alexis: current  Current BMI is Body mass index is 39.41 kg/m².    Tolerating a regular diet-yes  Eating at least 60 grams of protein per day-yes  Following 30/60 minute rule with liquids-yes  Drinking at least 64 ounces of fluid per day-yes  Drinking carbonated beverages-no  Sufficient exercise-yes  Using NSAIDs regularly-no  Using nicotine-no  Using alcohol-no. Advised about the risks of alcohol s/p bariatric surgery and recommend avoiding all alcohol      Postsurgical malabsorption  -At risk for malabsorption of vitamins/minerals secondary to malabsorption and restriction of intake from bariatric surgery  -Currently taking adequate postop bariatric surgery vitamin supplementation: Nader MVI with 45mg iron, calcium citrate 500mg TID    -Obtain vitamin/metabolic panel  -Patient received education about the importance of adhering to a lifelong supplementation regimen to avoid vitamin/mineral deficiencies       Mixed hyperlipidemia  -Avoid fried foods and trans fat, limit saturated fats and refined carbohydrates  -Increase fish/omega 3 FA consumption  -Increase physical activity  -obtain lipid panel       Diagnoses and all orders for this visit:    Encounter for surgical aftercare following surgery of digestive system    Postsurgical malabsorption    Mixed hyperlipidemia    Other orders  -     escitalopram (LEXAPRO) 5 mg tablet; Take 1 tablet by mouth in the morning  -     multivitamin (THERAGRAN) TABS; Take 1 tablet by mouth  "daily  -     calcium carbonate (OS-MIRELLA) 1250 (500 Ca) MG chewable tablet; Chew 1 tablet daily          Subjective:      Patient ID: Domitila Landers is a 29 y.o. female.    -s/p Vertical Sleeve Gastrectomy with Dr. Mehta on 03/10/25. Presents to the office today for routine follow up. Tolerating diet without issues; denies N/V, dysphagia, reflux. Overall doing well.    She has been walking 4-5x/week and gardening. History of disordered eating - feels uncomfortable measuring her own food.    Has 1 dog - 10 yo Pitty and 2 cats.  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     Diet Recall:  B - overnight oats with fairlife milk and flax seed  Snack - cheese and ham roll-ups  L - tuna and green salad   D - chicken and veggies or fish or tofu   HS - 1/2 protein shake    Fluids - 1/2 protein shake, 32oz water    The following portions of the patient's history were reviewed and updated as appropriate: allergies, current medications, past family history, past medical history, past social history, past surgical history and problem list.    Review of Systems   Constitutional:  Negative for chills and fever. Unexpected weight change: planned weight loss.  HENT:  Negative for trouble swallowing.    Respiratory:  Negative for cough and shortness of breath.    Cardiovascular:  Negative for chest pain and palpitations.   Gastrointestinal:  Negative for abdominal pain, constipation, diarrhea, nausea and vomiting.   Neurological:  Negative for dizziness.   Psychiatric/Behavioral:          Denies anxiety and depression         Objective:      /68 (BP Location: Left arm, Patient Position: Sitting, Cuff Size: Large)   Pulse 80   Resp 16   Ht 5' 3.5\" (1.613 m)   Wt 103 kg (226 lb)   BMI 39.41 kg/m²     Colonoscopy-Not applicable       Physical Exam  Vitals reviewed.   Constitutional:       General: She is not in acute distress.     Appearance: She is well-developed.   HENT:      Head: Normocephalic and " atraumatic.     Eyes:      General: No scleral icterus.      Cardiovascular:      Rate and Rhythm: Normal rate and regular rhythm.   Pulmonary:      Effort: Pulmonary effort is normal. No respiratory distress.   Abdominal:      General: Bowel sounds are normal. There is no distension.      Palpations: Abdomen is soft.     Skin:     General: Skin is warm and dry.     Neurological:      Mental Status: She is alert.     Psychiatric:         Mood and Affect: Mood normal.         Behavior: Behavior normal.           BARRIERS: none identified    GOALS:   Continued/Maintain healthy weight loss with good nutrition intakes.  Adequate hydration with at least 64oz. fluid intake.  Normal vitamin and mineral levels.  Exercise as tolerated.    Follow-up in 3 months. We kindly ask that your arrive 15 minutes before your scheduled appointment time with your provider to allow our staff to room you, get your vital signs and update your chart.    Follow diet as discussed.      Get lab work done in the next 2 weeks.  You have been given a lab slip today.  Please call the office if you need a replacement.  It is recommended to check with your insurance BEFORE getting labs done to make sure they are covered by your policy.  Also, please check with your PCP and other providers before getting labs to avoid duplicate labs. Make sure to HOLD any multivitamins that may contain biotin and any biotin supplements FOR 5 DAYS before any labs since it can affect the results.    Follow vitamin and mineral recommendations as reviewed with you.    Call our office if you have any problems with abdominal pain especially associated with fever, chills, nausea, vomiting or any other concerns.    All  Post-bariatric surgery patients should be aware that very small quantities of any alcohol can cause impairment and it is very possible not to feel the effect. The effect can be in the system for several hours.  It is also a stomach irritant.     It is advised  to AVOID alcohol, Nonsteroidal antiinflammatory drugs (NSAIDS) and nicotine of all forms . Any of these can cause stomach irritation/pain.

## 2025-07-07 ENCOUNTER — CLINICAL SUPPORT (OUTPATIENT)
Dept: BARIATRICS | Facility: CLINIC | Age: 30
End: 2025-07-07

## 2025-07-07 DIAGNOSIS — Z98.84 BARIATRIC SURGERY STATUS: Primary | ICD-10-CM

## 2025-07-07 DIAGNOSIS — F41.1 GAD (GENERALIZED ANXIETY DISORDER): ICD-10-CM

## 2025-07-07 DIAGNOSIS — F43.10 PTSD (POST-TRAUMATIC STRESS DISORDER): ICD-10-CM

## 2025-07-07 PROCEDURE — RECHECK

## 2025-07-07 NOTE — PROGRESS NOTES
Post op support. s/p Vertical Sleeve Gastrectomy with Dr. Mehta on 03/10/25. Patient had recent visit with JUAN LUIS Varela 6/30 and referred to \[facility]\""W for additional support. Talked to Nichole about not logging and not tracking due to triggering past traumas. Feeling vulnerable.   Has not seen her therapist since she had surgery. Was overwhelmed with all of the behavioral changes. Feels like she is disappointing the team, feels like she is failing. Gave support. Encouraged positive coping skills/ self kindness rather than sabotaging with negative self talk.  Has been having 3 meals per day. Drinking 32 oz of water, 8 oz fairlife milk with protein powder or ensure max protein shake. Walking 4-5x per week. Focus on NSVs. Needs to complete blood work. Schedule follow up with her therapist.  Adry Bernard LCSW

## 2025-07-11 NOTE — PROGRESS NOTES
"Bariatric Nutrition Progress Note    -s/p Vertical Sleeve Gastrectomy with Dr. Mehta on 03/10/25     CC:   Height: 5'3.5\"    Current Weight: 222.2#   BMI: 38.7  Weight Prior to Weight Loss Surgery: 305.6#  Weight in (lb) to have BMI = 25: 143.5  FLAVIO:Current   Regain: N/A    Vitamin Regimen: : Nader MVI with 45mg iron, calcium citrate 500mg TID   Bloodwork to be completed     Diet and exercise:    Tolerating a regular diet - Yes  3 meals: Yes  Snacking/grazing - not daily  Volume: at 4 months 1/3 cup    History of disordered eating - feels uncomfortable measuring her own food.  1.5 oz Protein 4-5 carrots      Eating at least 60 grams of protein per day-  Protein drinks: Yes - Ensure Max - Whey Powder - from [employer]   Following 30/60 minute rule with liquids-yes  Eating/drinking: chewing food well- sipping fluids Yes  Drinking at least 64 ounces of fluid per day- 32 oz water + shake=+ 4 oz Fairlife milk w/PB powder  Drinking carbonated beverages-no  Food logging - No  GI discomforts denies N/V, dysphagia, reflux.  Every   Exercise: walking 4-5x/week and gardening. But more with house plants  Other  Has 1 dog - 10 yo Pitty and 2 cats.  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 Doing well with weight loss. Taking vitamins - needs to complete blood work. Reports her food disorder resurfacing since surgery. Finds herself wanting to restrict more so she will lose faster. Explained effect if diet doesn't progress. That could be reason for stall though presently pt is still losing but slower than earlier months. Explained that is normal course.  Pt is " triggered by food logging and measuring foods so not able to utilize at this time.  Pt also working with Adry and Nichole and to reach out to her therapist for support.  Hair loss starting - taking collagen pills. Also discussed powder options and gave samples. Also reviewed protein snack list also to guide pt in choice.Also advised on adding fiber as BM q 4 days. Questions/concerns addressed during session. Pt receptive  Goals  Keep to post op guidelines  Maintain/I protein (70-80 grams)  Increase hydration ( 64 oz )  Avoid grazing  Volume at 1/3 - 1/2 cup c per meals - Inc  Keep to vitamin regimen as advised ( Nader Multi with 45 mg Iron and 1500 mg Calcium)   Complete blood work   Increase physical activity and include exercise as able  F/U RD q 2 months    Time Spent: 45 minutes

## 2025-07-14 ENCOUNTER — CLINICAL SUPPORT (OUTPATIENT)
Dept: BARIATRICS | Facility: CLINIC | Age: 30
End: 2025-07-14

## 2025-07-14 VITALS — BODY MASS INDEX: 38.74 KG/M2 | WEIGHT: 222.2 LBS

## 2025-07-14 DIAGNOSIS — K91.2 POSTSURGICAL MALABSORPTION: Primary | ICD-10-CM

## 2025-07-14 PROCEDURE — RECHECK

## 2025-08-21 DIAGNOSIS — Z01.818 OTHER SPECIFIED PRE-OPERATIVE EXAMINATION: ICD-10-CM

## 2025-08-21 RX ORDER — OMEPRAZOLE 20 MG/1
20 CAPSULE, DELAYED RELEASE ORAL DAILY
Qty: 90 CAPSULE | Refills: 1 | Status: SHIPPED | OUTPATIENT
Start: 2025-08-21

## (undated) DEVICE — ENDOPATH ECHELON ENDOSCOPIC LINEAR CUTTER RELOADS, GREEN, 60MM: Brand: ECHELON ENDOPATH

## (undated) DEVICE — KERLIX BANDAGE ROLL: Brand: KERLIX

## (undated) DEVICE — INTENDED FOR TISSUE SEPARATION, AND OTHER PROCEDURES THAT REQUIRE A SHARP SURGICAL BLADE TO PUNCTURE OR CUT.: Brand: BARD-PARKER SAFETY BLADES SIZE 11, STERILE

## (undated) DEVICE — DRAPE SHEET THREE QUARTER

## (undated) DEVICE — INSUFFLATION NEEDLE TO ESTABLISH PNEUMOPERITONEUM.: Brand: INSUFFLATION NEEDLE

## (undated) DEVICE — LUBRICANT JELLY SURGILUBE TUBE 4OZ FLIP TOP

## (undated) DEVICE — NEPTUNE E-SEP SMOKE EVACUATION PENCIL, COATED, 70MM BLADE, PUSH BUTTON SWITCH: Brand: NEPTUNE E-SEP

## (undated) DEVICE — X-RAY DETECTABLE SPONGES,16 PLY: Brand: VISTEC

## (undated) DEVICE — SUT MONOCRYL 4-0 PS-2 27 IN Y426H

## (undated) DEVICE — LIGHT GLOVE GREEN

## (undated) DEVICE — SCD SEQUENTIAL COMPRESSION COMFORT SLEEVE LARGE KNEE LENGTH: Brand: KENDALL SCD

## (undated) DEVICE — TRAVELKIT CONTAINS FIRST STEP KIT (200ML EP-4 KIT) AND SOILED SCOPE BAG - 1 KIT: Brand: TRAVELKIT CONTAINS FIRST STEP KIT AND SOILED SCOPE BAG

## (undated) DEVICE — 60 ML SYRINGE,REGULAR TIP: Brand: MONOJECT

## (undated) DEVICE — ASTOUND STANDARD SURGICAL GOWN, XL: Brand: CONVERTORS

## (undated) DEVICE — PAD CAST 4 IN COTTON NON STERILE

## (undated) DEVICE — UTILITY MARKER,BLACK WITH LABELS: Brand: DEVON

## (undated) DEVICE — GLOVE INDICATOR PI UNDERGLOVE SZ 7 BLUE

## (undated) DEVICE — ENDOPATH XCEL BLADELESS TROCARS WITH STABILITY SLEEVES: Brand: ENDOPATH XCEL

## (undated) DEVICE — EXOFIN PRECISION PEN HIGH VISCOSITY TOPICAL SKIN ADHESIVE: Brand: EXOFIN PRECISION PEN, 1G

## (undated) DEVICE — KIT, GASTRIC BYPASS: Brand: CARDINAL HEALTH

## (undated) DEVICE — SPONGE 4 X 4 XRAY 16 PLY STRL LF RFD

## (undated) DEVICE — REM POLYHESIVE ADULT PATIENT RETURN ELECTRODE: Brand: VALLEYLAB

## (undated) DEVICE — TOWEL SURG XR DETECT GREEN STRL RFD

## (undated) DEVICE — GLOVE SRG BIOGEL 7

## (undated) DEVICE — TROCAR: Brand: KII SLEEVE

## (undated) DEVICE — HARMONIC 1100 SHEARS, 36CM SHAFT LENGTH: Brand: HARMONIC

## (undated) DEVICE — TUBE SET SMOKE EVAC PNEUMOCLEAR HUMIDIFIED

## (undated) DEVICE — LIGACLIP 10-M/L, 10MM ENDOSCOPIC ROTATING MULTIPLE CLIP APPLIERS: Brand: LIGACLIP

## (undated) DEVICE — ECHELON 60MM REINFORCEMENT: Brand: ECHLEON

## (undated) DEVICE — TROCAR: Brand: KII® SLEEVE

## (undated) DEVICE — GLOVE SRG BIOGEL ECLIPSE 7.5

## (undated) DEVICE — METZENBAUM ADTEC SINGLE USE DISSECTING SCISSORS, SHAFT ONLY, MONOPOLAR, CURVED TO LEFT, WORKING LENGTH: 12 1/4", (310 MM), DIAM. 5 MM, INSULATED, DOUBLE ACTION, STERILE, DISPOSABLE, PACKAGE OF 10 PIECES: Brand: AESCULAP

## (undated) DEVICE — 4-PORT MANIFOLD: Brand: NEPTUNE 2

## (undated) DEVICE — ECHELON 3000 60MM LONG: Brand: ECHELON

## (undated) DEVICE — VISIGI 3D®  CALIBRATION SYSTEM  SIZE 36FR SLEEVE/STD: Brand: BOEHRINGER® VISIGI 3D™ SLEEVE GASTRECTOMY CALIBRATION SYSTEM, SIZE 36FR

## (undated) DEVICE — ENDOPATH ECHELON ENDOSCOPIC LINEAR CUTTER RELOADS, BLUE, 60MM: Brand: ECHELON ENDOPATH

## (undated) DEVICE — GLOVE SRG BIOGEL ECLIPSE 8